# Patient Record
Sex: FEMALE | Race: WHITE | Employment: OTHER | URBAN - METROPOLITAN AREA
[De-identification: names, ages, dates, MRNs, and addresses within clinical notes are randomized per-mention and may not be internally consistent; named-entity substitution may affect disease eponyms.]

---

## 2020-06-24 ENCOUNTER — HOSPITAL ENCOUNTER (OUTPATIENT)
Dept: ULTRASOUND IMAGING | Age: 70
Discharge: HOME | End: 2020-06-24
Attending: FAMILY MEDICINE
Payer: MEDICARE

## 2020-06-24 DIAGNOSIS — R74.01 ELEVATED AST (SGOT): ICD-10-CM

## 2020-06-24 DIAGNOSIS — R74.01 ELEVATED ALT MEASUREMENT: ICD-10-CM

## 2020-06-24 PROCEDURE — 76705 ECHO EXAM OF ABDOMEN: CPT

## 2021-01-01 ENCOUNTER — HOME CARE VISIT (OUTPATIENT)
Dept: SCHEDULING | Facility: HOME HEALTH | Age: 71
End: 2021-01-01
Payer: MEDICARE

## 2021-01-01 ENCOUNTER — TRANSCRIBE ORDER (OUTPATIENT)
Dept: SCHEDULING | Age: 71
End: 2021-01-01

## 2021-01-01 ENCOUNTER — HOME CARE VISIT (OUTPATIENT)
Dept: HOME HEALTH SERVICES | Facility: HOME HEALTH | Age: 71
End: 2021-01-01
Payer: MEDICARE

## 2021-01-01 ENCOUNTER — APPOINTMENT (OUTPATIENT)
Dept: CT IMAGING | Age: 71
DRG: 641 | End: 2021-01-01
Attending: EMERGENCY MEDICINE
Payer: MEDICARE

## 2021-01-01 ENCOUNTER — OFFICE VISIT (OUTPATIENT)
Dept: HEMATOLOGY | Age: 71
End: 2021-01-01
Payer: MEDICARE

## 2021-01-01 ENCOUNTER — APPOINTMENT (OUTPATIENT)
Dept: NON INVASIVE DIAGNOSTICS | Age: 71
DRG: 641 | End: 2021-01-01
Attending: NURSE PRACTITIONER
Payer: MEDICARE

## 2021-01-01 ENCOUNTER — PATIENT OUTREACH (OUTPATIENT)
Dept: CASE MANAGEMENT | Age: 71
End: 2021-01-01

## 2021-01-01 ENCOUNTER — APPOINTMENT (OUTPATIENT)
Dept: CT IMAGING | Age: 71
End: 2021-01-01
Attending: STUDENT IN AN ORGANIZED HEALTH CARE EDUCATION/TRAINING PROGRAM
Payer: MEDICARE

## 2021-01-01 ENCOUNTER — HOSPITAL ENCOUNTER (EMERGENCY)
Age: 71
Discharge: HOME | End: 2021-11-23
Attending: STUDENT IN AN ORGANIZED HEALTH CARE EDUCATION/TRAINING PROGRAM
Payer: MEDICARE

## 2021-01-01 ENCOUNTER — HOSPITAL ENCOUNTER (INPATIENT)
Age: 71
LOS: 5 days | Discharge: NURSING, REHAB OR LONG-TERM CARE | DRG: 641 | End: 2021-12-06
Attending: EMERGENCY MEDICINE | Admitting: HOSPITALIST
Payer: MEDICARE

## 2021-01-01 ENCOUNTER — APPOINTMENT (OUTPATIENT)
Dept: GENERAL RADIOLOGY | Age: 71
DRG: 641 | End: 2021-01-01
Attending: EMERGENCY MEDICINE
Payer: MEDICARE

## 2021-01-01 ENCOUNTER — HOSPITAL ENCOUNTER (OUTPATIENT)
Dept: ULTRASOUND IMAGING | Age: 71
Discharge: HOME | End: 2021-08-05
Attending: PHYSICIAN ASSISTANT
Payer: MEDICARE

## 2021-01-01 ENCOUNTER — HOME HEALTH ADMISSION (OUTPATIENT)
Dept: HOME HEALTH SERVICES | Facility: HOME HEALTH | Age: 71
End: 2021-01-01
Payer: MEDICARE

## 2021-01-01 ENCOUNTER — HOSPITAL ENCOUNTER (OUTPATIENT)
Dept: CT IMAGING | Age: 71
Discharge: HOME | End: 2021-10-07
Attending: NURSE PRACTITIONER
Payer: MEDICARE

## 2021-01-01 ENCOUNTER — HOSPITAL ENCOUNTER (OUTPATIENT)
Dept: CT IMAGING | Age: 71
Discharge: HOME | End: 2021-07-26
Attending: PHYSICIAN ASSISTANT
Payer: MEDICARE

## 2021-01-01 VITALS
SYSTOLIC BLOOD PRESSURE: 130 MMHG | HEART RATE: 69 BPM | OXYGEN SATURATION: 99 % | DIASTOLIC BLOOD PRESSURE: 70 MMHG | RESPIRATION RATE: 16 BRPM | TEMPERATURE: 97.5 F

## 2021-01-01 VITALS
RESPIRATION RATE: 16 BRPM | OXYGEN SATURATION: 99 % | HEART RATE: 50 BPM | SYSTOLIC BLOOD PRESSURE: 138 MMHG | DIASTOLIC BLOOD PRESSURE: 80 MMHG

## 2021-01-01 VITALS
TEMPERATURE: 97.3 F | RESPIRATION RATE: 16 BRPM | SYSTOLIC BLOOD PRESSURE: 126 MMHG | OXYGEN SATURATION: 98 % | DIASTOLIC BLOOD PRESSURE: 80 MMHG | HEART RATE: 64 BPM

## 2021-01-01 VITALS
DIASTOLIC BLOOD PRESSURE: 64 MMHG | TEMPERATURE: 97.6 F | HEART RATE: 60 BPM | TEMPERATURE: 97.3 F | RESPIRATION RATE: 16 BRPM | DIASTOLIC BLOOD PRESSURE: 76 MMHG | OXYGEN SATURATION: 98 % | OXYGEN SATURATION: 98 % | SYSTOLIC BLOOD PRESSURE: 120 MMHG | RESPIRATION RATE: 18 BRPM | SYSTOLIC BLOOD PRESSURE: 118 MMHG | HEART RATE: 79 BPM

## 2021-01-01 VITALS
RESPIRATION RATE: 16 BRPM | HEART RATE: 64 BPM | SYSTOLIC BLOOD PRESSURE: 110 MMHG | OXYGEN SATURATION: 98 % | DIASTOLIC BLOOD PRESSURE: 70 MMHG | TEMPERATURE: 97.3 F

## 2021-01-01 VITALS
DIASTOLIC BLOOD PRESSURE: 64 MMHG | RESPIRATION RATE: 16 BRPM | OXYGEN SATURATION: 96 % | HEART RATE: 66 BPM | TEMPERATURE: 97 F | SYSTOLIC BLOOD PRESSURE: 104 MMHG

## 2021-01-01 VITALS
OXYGEN SATURATION: 98 % | HEART RATE: 68 BPM | TEMPERATURE: 97.1 F | DIASTOLIC BLOOD PRESSURE: 82 MMHG | SYSTOLIC BLOOD PRESSURE: 142 MMHG | RESPIRATION RATE: 18 BRPM

## 2021-01-01 VITALS
SYSTOLIC BLOOD PRESSURE: 116 MMHG | RESPIRATION RATE: 16 BRPM | BODY MASS INDEX: 32.6 KG/M2 | WEIGHT: 184 LBS | HEIGHT: 63 IN | TEMPERATURE: 98.3 F | DIASTOLIC BLOOD PRESSURE: 69 MMHG | HEART RATE: 65 BPM | OXYGEN SATURATION: 98 %

## 2021-01-01 VITALS
SYSTOLIC BLOOD PRESSURE: 123 MMHG | HEART RATE: 60 BPM | TEMPERATURE: 97.6 F | DIASTOLIC BLOOD PRESSURE: 69 MMHG | RESPIRATION RATE: 16 BRPM | OXYGEN SATURATION: 97 %

## 2021-01-01 VITALS
SYSTOLIC BLOOD PRESSURE: 138 MMHG | RESPIRATION RATE: 16 BRPM | DIASTOLIC BLOOD PRESSURE: 72 MMHG | HEART RATE: 60 BPM | TEMPERATURE: 97.1 F

## 2021-01-01 VITALS
OXYGEN SATURATION: 98 % | HEART RATE: 60 BPM | SYSTOLIC BLOOD PRESSURE: 108 MMHG | RESPIRATION RATE: 18 BRPM | DIASTOLIC BLOOD PRESSURE: 64 MMHG

## 2021-01-01 VITALS
TEMPERATURE: 97.4 F | OXYGEN SATURATION: 97 % | HEART RATE: 78 BPM | DIASTOLIC BLOOD PRESSURE: 72 MMHG | RESPIRATION RATE: 18 BRPM | SYSTOLIC BLOOD PRESSURE: 122 MMHG

## 2021-01-01 VITALS
SYSTOLIC BLOOD PRESSURE: 130 MMHG | RESPIRATION RATE: 18 BRPM | DIASTOLIC BLOOD PRESSURE: 68 MMHG | OXYGEN SATURATION: 97 % | TEMPERATURE: 97.4 F | HEART RATE: 88 BPM

## 2021-01-01 VITALS
SYSTOLIC BLOOD PRESSURE: 131 MMHG | DIASTOLIC BLOOD PRESSURE: 70 MMHG | HEART RATE: 80 BPM | TEMPERATURE: 97.8 F | OXYGEN SATURATION: 98 % | RESPIRATION RATE: 18 BRPM

## 2021-01-01 VITALS
SYSTOLIC BLOOD PRESSURE: 130 MMHG | OXYGEN SATURATION: 98 % | TEMPERATURE: 97.1 F | DIASTOLIC BLOOD PRESSURE: 84 MMHG | HEART RATE: 81 BPM | RESPIRATION RATE: 16 BRPM

## 2021-01-01 VITALS
TEMPERATURE: 97.8 F | HEART RATE: 60 BPM | RESPIRATION RATE: 18 BRPM | SYSTOLIC BLOOD PRESSURE: 124 MMHG | DIASTOLIC BLOOD PRESSURE: 98 MMHG | OXYGEN SATURATION: 98 %

## 2021-01-01 VITALS
TEMPERATURE: 97.8 F | DIASTOLIC BLOOD PRESSURE: 98 MMHG | HEART RATE: 60 BPM | SYSTOLIC BLOOD PRESSURE: 124 MMHG | RESPIRATION RATE: 18 BRPM | OXYGEN SATURATION: 98 %

## 2021-01-01 VITALS
DIASTOLIC BLOOD PRESSURE: 64 MMHG | TEMPERATURE: 96.8 F | HEART RATE: 62 BPM | OXYGEN SATURATION: 97 % | SYSTOLIC BLOOD PRESSURE: 106 MMHG | RESPIRATION RATE: 18 BRPM

## 2021-01-01 VITALS
TEMPERATURE: 97.2 F | OXYGEN SATURATION: 97 % | HEART RATE: 88 BPM | DIASTOLIC BLOOD PRESSURE: 68 MMHG | RESPIRATION RATE: 18 BRPM | SYSTOLIC BLOOD PRESSURE: 118 MMHG

## 2021-01-01 VITALS
RESPIRATION RATE: 18 BRPM | SYSTOLIC BLOOD PRESSURE: 124 MMHG | OXYGEN SATURATION: 98 % | DIASTOLIC BLOOD PRESSURE: 72 MMHG | HEART RATE: 87 BPM | TEMPERATURE: 97.4 F

## 2021-01-01 VITALS
SYSTOLIC BLOOD PRESSURE: 129 MMHG | TEMPERATURE: 97 F | DIASTOLIC BLOOD PRESSURE: 76 MMHG | OXYGEN SATURATION: 96 % | HEART RATE: 77 BPM

## 2021-01-01 VITALS
RESPIRATION RATE: 16 BRPM | OXYGEN SATURATION: 99 % | TEMPERATURE: 97 F | DIASTOLIC BLOOD PRESSURE: 76 MMHG | SYSTOLIC BLOOD PRESSURE: 120 MMHG | HEART RATE: 70 BPM

## 2021-01-01 VITALS
RESPIRATION RATE: 18 BRPM | SYSTOLIC BLOOD PRESSURE: 118 MMHG | HEART RATE: 60 BPM | TEMPERATURE: 97.5 F | OXYGEN SATURATION: 94 % | DIASTOLIC BLOOD PRESSURE: 70 MMHG

## 2021-01-01 VITALS
DIASTOLIC BLOOD PRESSURE: 80 MMHG | TEMPERATURE: 97.2 F | RESPIRATION RATE: 16 BRPM | HEART RATE: 80 BPM | SYSTOLIC BLOOD PRESSURE: 130 MMHG | OXYGEN SATURATION: 98 %

## 2021-01-01 VITALS
TEMPERATURE: 97.8 F | OXYGEN SATURATION: 92 % | HEIGHT: 64 IN | DIASTOLIC BLOOD PRESSURE: 71 MMHG | BODY MASS INDEX: 26.34 KG/M2 | RESPIRATION RATE: 19 BRPM | HEART RATE: 87 BPM | WEIGHT: 154.3 LBS | SYSTOLIC BLOOD PRESSURE: 128 MMHG

## 2021-01-01 VITALS
OXYGEN SATURATION: 98 % | SYSTOLIC BLOOD PRESSURE: 100 MMHG | DIASTOLIC BLOOD PRESSURE: 60 MMHG | RESPIRATION RATE: 16 BRPM | HEART RATE: 62 BPM

## 2021-01-01 VITALS
OXYGEN SATURATION: 98 % | TEMPERATURE: 97.6 F | SYSTOLIC BLOOD PRESSURE: 110 MMHG | HEART RATE: 60 BPM | DIASTOLIC BLOOD PRESSURE: 64 MMHG | RESPIRATION RATE: 16 BRPM

## 2021-01-01 VITALS
SYSTOLIC BLOOD PRESSURE: 110 MMHG | DIASTOLIC BLOOD PRESSURE: 58 MMHG | HEART RATE: 60 BPM | RESPIRATION RATE: 18 BRPM | TEMPERATURE: 97 F | OXYGEN SATURATION: 98 %

## 2021-01-01 VITALS
HEART RATE: 60 BPM | TEMPERATURE: 97.3 F | OXYGEN SATURATION: 98 % | DIASTOLIC BLOOD PRESSURE: 68 MMHG | RESPIRATION RATE: 18 BRPM | SYSTOLIC BLOOD PRESSURE: 122 MMHG

## 2021-01-01 VITALS
HEART RATE: 55 BPM | TEMPERATURE: 97.2 F | RESPIRATION RATE: 18 BRPM | DIASTOLIC BLOOD PRESSURE: 68 MMHG | OXYGEN SATURATION: 96 % | SYSTOLIC BLOOD PRESSURE: 110 MMHG

## 2021-01-01 VITALS
RESPIRATION RATE: 18 BRPM | OXYGEN SATURATION: 95 % | SYSTOLIC BLOOD PRESSURE: 122 MMHG | DIASTOLIC BLOOD PRESSURE: 78 MMHG | HEART RATE: 70 BPM | TEMPERATURE: 97.2 F

## 2021-01-01 VITALS
TEMPERATURE: 97.1 F | TEMPERATURE: 99 F | SYSTOLIC BLOOD PRESSURE: 120 MMHG | DIASTOLIC BLOOD PRESSURE: 80 MMHG | BODY MASS INDEX: 30.12 KG/M2 | RESPIRATION RATE: 16 BRPM | HEIGHT: 63 IN | RESPIRATION RATE: 16 BRPM | OXYGEN SATURATION: 97 % | WEIGHT: 170 LBS | DIASTOLIC BLOOD PRESSURE: 66 MMHG | OXYGEN SATURATION: 97 % | HEART RATE: 60 BPM | SYSTOLIC BLOOD PRESSURE: 128 MMHG | HEART RATE: 84 BPM

## 2021-01-01 VITALS
OXYGEN SATURATION: 95 % | SYSTOLIC BLOOD PRESSURE: 120 MMHG | DIASTOLIC BLOOD PRESSURE: 64 MMHG | RESPIRATION RATE: 16 BRPM | TEMPERATURE: 97.5 F | HEART RATE: 60 BPM

## 2021-01-01 VITALS
HEART RATE: 87 BPM | RESPIRATION RATE: 18 BRPM | TEMPERATURE: 97.4 F | DIASTOLIC BLOOD PRESSURE: 72 MMHG | SYSTOLIC BLOOD PRESSURE: 124 MMHG | OXYGEN SATURATION: 98 %

## 2021-01-01 VITALS
HEART RATE: 54 BPM | TEMPERATURE: 97.2 F | DIASTOLIC BLOOD PRESSURE: 76 MMHG | SYSTOLIC BLOOD PRESSURE: 120 MMHG | RESPIRATION RATE: 18 BRPM | OXYGEN SATURATION: 94 %

## 2021-01-01 VITALS
SYSTOLIC BLOOD PRESSURE: 111 MMHG | TEMPERATURE: 97.7 F | RESPIRATION RATE: 20 BRPM | WEIGHT: 160 LBS | HEART RATE: 62 BPM | BODY MASS INDEX: 28.35 KG/M2 | OXYGEN SATURATION: 96 % | DIASTOLIC BLOOD PRESSURE: 65 MMHG | HEIGHT: 63 IN

## 2021-01-01 VITALS
SYSTOLIC BLOOD PRESSURE: 112 MMHG | HEART RATE: 81 BPM | TEMPERATURE: 97.1 F | RESPIRATION RATE: 18 BRPM | OXYGEN SATURATION: 96 % | DIASTOLIC BLOOD PRESSURE: 65 MMHG

## 2021-01-01 VITALS
SYSTOLIC BLOOD PRESSURE: 122 MMHG | RESPIRATION RATE: 18 BRPM | HEART RATE: 59 BPM | TEMPERATURE: 97.1 F | DIASTOLIC BLOOD PRESSURE: 68 MMHG | OXYGEN SATURATION: 98 %

## 2021-01-01 VITALS
TEMPERATURE: 97 F | RESPIRATION RATE: 18 BRPM | OXYGEN SATURATION: 98 % | DIASTOLIC BLOOD PRESSURE: 80 MMHG | HEART RATE: 70 BPM | SYSTOLIC BLOOD PRESSURE: 140 MMHG

## 2021-01-01 DIAGNOSIS — R74.8 ELEVATED LIVER ENZYMES: ICD-10-CM

## 2021-01-01 DIAGNOSIS — R26.89 LOSS OF BALANCE: Primary | ICD-10-CM

## 2021-01-01 DIAGNOSIS — R74.8 ELEVATED LIVER ENZYMES: Primary | ICD-10-CM

## 2021-01-01 DIAGNOSIS — D69.6 THROMBOCYTOPENIA (HCC): ICD-10-CM

## 2021-01-01 DIAGNOSIS — R79.89 ELEVATED LFTS: ICD-10-CM

## 2021-01-01 DIAGNOSIS — S09.90XA CLOSED HEAD INJURY, INITIAL ENCOUNTER: ICD-10-CM

## 2021-01-01 DIAGNOSIS — K74.60 CIRRHOSIS OF LIVER WITHOUT ASCITES, UNSPECIFIED HEPATIC CIRRHOSIS TYPE (HCC): ICD-10-CM
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
## 2021-01-01 DIAGNOSIS — R29.6 RECURRENT FALLS: Primary | ICD-10-CM

## 2021-01-01 DIAGNOSIS — R62.7 FAILURE TO THRIVE IN ADULT: ICD-10-CM

## 2021-01-01 DIAGNOSIS — D69.6 THROMBOCYTOPENIC (HCC): ICD-10-CM

## 2021-01-01 DIAGNOSIS — R26.89 LOSS OF BALANCE: ICD-10-CM

## 2021-01-01 DIAGNOSIS — C50.919 MALIGNANT NEOPLASM OF FEMALE BREAST, UNSPECIFIED ESTROGEN RECEPTOR STATUS, UNSPECIFIED LATERALITY, UNSPECIFIED SITE OF BREAST (HCC): ICD-10-CM

## 2021-01-01 DIAGNOSIS — E53.8 B12 DEFICIENCY: ICD-10-CM

## 2021-01-01 DIAGNOSIS — D69.6 THROMBOCYTOPENIA (HCC): Primary | ICD-10-CM

## 2021-01-01 DIAGNOSIS — R26.2 AMBULATORY DYSFUNCTION: ICD-10-CM

## 2021-01-01 DIAGNOSIS — E87.6 HYPOKALEMIA: ICD-10-CM

## 2021-01-01 LAB
ACE SERPL-CCNC: 8 U/L (ref 14–82)
ACTIN IGG SERPL-ACNC: 6 UNITS (ref 0–19)
AFP L3 MFR SERPL: 9.9 % (ref 0–9.9)
AFP SERPL-MCNC: 5 NG/ML (ref 0–8)
ALBUMIN SERPL-MCNC: 2.1 G/DL (ref 3.5–5)
ALBUMIN SERPL-MCNC: 2.1 G/DL (ref 3.5–5)
ALBUMIN SERPL-MCNC: 2.2 G/DL (ref 3.5–5)
ALBUMIN SERPL-MCNC: 2.5 G/DL (ref 3.5–5)
ALBUMIN SERPL-MCNC: 2.6 G/DL (ref 3.5–5)
ALBUMIN SERPL-MCNC: 3 G/DL (ref 3.5–5)
ALBUMIN SERPL-MCNC: 3.2 G/DL (ref 3.5–5)
ALBUMIN/GLOB SERPL: 0.5 {RATIO} (ref 1.1–2.2)
ALBUMIN/GLOB SERPL: 0.5 {RATIO} (ref 1.1–2.2)
ALBUMIN/GLOB SERPL: 0.6 {RATIO} (ref 1.1–2.2)
ALBUMIN/GLOB SERPL: 0.7 {RATIO} (ref 1.1–2.2)
ALP SERPL-CCNC: 100 U/L (ref 45–117)
ALP SERPL-CCNC: 101 U/L (ref 45–117)
ALP SERPL-CCNC: 107 U/L (ref 45–117)
ALP SERPL-CCNC: 112 U/L (ref 45–117)
ALP SERPL-CCNC: 116 U/L (ref 45–117)
ALP SERPL-CCNC: 94 U/L (ref 45–117)
ALP SERPL-CCNC: 95 U/L (ref 45–117)
ALT SERPL-CCNC: 115 U/L (ref 12–78)
ALT SERPL-CCNC: 55 U/L (ref 12–78)
ALT SERPL-CCNC: 89 U/L (ref 12–78)
ALT SERPL-CCNC: 90 U/L (ref 12–78)
ALT SERPL-CCNC: 91 U/L (ref 12–78)
ALT SERPL-CCNC: 95 U/L (ref 12–78)
ALT SERPL-CCNC: 99 U/L (ref 12–78)
AMMONIA PLAS-SCNC: 34 UMOL/L
AMMONIA PLAS-SCNC: 43 UMOL/L
AMMONIA PLAS-SCNC: 62 UMOL/L
AMMONIA PLAS-SCNC: 69 UMOL/L
ANA SER QL: NEGATIVE
ANION GAP SERPL CALC-SCNC: 10 MMOL/L (ref 5–15)
ANION GAP SERPL CALC-SCNC: 4 MMOL/L (ref 5–15)
ANION GAP SERPL CALC-SCNC: 6 MMOL/L (ref 5–15)
ANION GAP SERPL CALC-SCNC: 7 MMOL/L (ref 5–15)
ANION GAP SERPL CALC-SCNC: 8 MMOL/L (ref 5–15)
ANION GAP SERPL CALC-SCNC: 8 MMOL/L (ref 5–15)
ANION GAP SERPL CALC-SCNC: 9 MMOL/L (ref 5–15)
APPEARANCE UR: ABNORMAL
APPEARANCE UR: CLEAR
AST SERPL-CCNC: 143 U/L (ref 15–37)
AST SERPL-CCNC: 151 U/L (ref 15–37)
AST SERPL-CCNC: 155 U/L (ref 15–37)
AST SERPL-CCNC: 161 U/L (ref 15–37)
AST SERPL-CCNC: 186 U/L (ref 15–37)
AST SERPL-CCNC: 214 U/L (ref 15–37)
AST SERPL-CCNC: 98 U/L (ref 15–37)
ATRIAL RATE: 75 BPM
ATRIAL RATE: 79 BPM
BACTERIA URNS QL MICRO: NEGATIVE /HPF
BACTERIA URNS QL MICRO: NEGATIVE /HPF
BASOPHILS # BLD: 0 K/UL (ref 0–0.1)
BASOPHILS NFR BLD: 0 % (ref 0–1)
BASOPHILS NFR BLD: 1 % (ref 0–1)
BILIRUB DIRECT SERPL-MCNC: 0.3 MG/DL (ref 0–0.2)
BILIRUB DIRECT SERPL-MCNC: 0.4 MG/DL (ref 0–0.2)
BILIRUB INDIRECT SERPL-MCNC: 0.9 MG/DL (ref 0–1.1)
BILIRUB SERPL-MCNC: 0.8 MG/DL (ref 0.2–1)
BILIRUB SERPL-MCNC: 1.3 MG/DL (ref 0.2–1)
BILIRUB SERPL-MCNC: 1.6 MG/DL (ref 0.2–1)
BILIRUB SERPL-MCNC: 1.8 MG/DL (ref 0.2–1)
BILIRUB SERPL-MCNC: 2 MG/DL (ref 0.2–1)
BILIRUB UR QL CFM: NEGATIVE
BILIRUB UR QL CFM: NEGATIVE
BNP SERPL-MCNC: 1272 PG/ML
BUN SERPL-MCNC: 12 MG/DL (ref 6–20)
BUN SERPL-MCNC: 13 MG/DL (ref 6–20)
BUN SERPL-MCNC: 14 MG/DL (ref 6–20)
BUN SERPL-MCNC: 15 MG/DL (ref 6–20)
BUN SERPL-MCNC: 16 MG/DL (ref 6–20)
BUN SERPL-MCNC: 17 MG/DL (ref 6–20)
BUN SERPL-MCNC: 28 MG/DL (ref 6–20)
BUN/CREAT SERPL: 17 (ref 12–20)
BUN/CREAT SERPL: 18 (ref 12–20)
BUN/CREAT SERPL: 19 (ref 12–20)
BUN/CREAT SERPL: 20 (ref 12–20)
BUN/CREAT SERPL: 22 (ref 12–20)
C-ANCA TITR SER IF: ABNORMAL TITER
CALCIUM SERPL-MCNC: 8.2 MG/DL (ref 8.5–10.1)
CALCIUM SERPL-MCNC: 8.4 MG/DL (ref 8.5–10.1)
CALCIUM SERPL-MCNC: 8.4 MG/DL (ref 8.5–10.1)
CALCIUM SERPL-MCNC: 8.5 MG/DL (ref 8.5–10.1)
CALCIUM SERPL-MCNC: 8.9 MG/DL (ref 8.5–10.1)
CALCIUM SERPL-MCNC: 9 MG/DL (ref 8.5–10.1)
CALCIUM SERPL-MCNC: 9.4 MG/DL (ref 8.5–10.1)
CALCULATED P AXIS, ECG09: 28 DEGREES
CALCULATED P AXIS, ECG09: 57 DEGREES
CALCULATED R AXIS, ECG10: 12 DEGREES
CALCULATED R AXIS, ECG10: 13 DEGREES
CALCULATED T AXIS, ECG11: 29 DEGREES
CALCULATED T AXIS, ECG11: 47 DEGREES
CERULOPLASMIN SERPL-MCNC: 28 MG/DL (ref 19–39)
CHLORIDE SERPL-SCNC: 102 MMOL/L (ref 97–108)
CHLORIDE SERPL-SCNC: 102 MMOL/L (ref 97–108)
CHLORIDE SERPL-SCNC: 103 MMOL/L (ref 97–108)
CHLORIDE SERPL-SCNC: 104 MMOL/L (ref 97–108)
CHLORIDE SERPL-SCNC: 105 MMOL/L (ref 97–108)
CHLORIDE SERPL-SCNC: 109 MMOL/L (ref 97–108)
CHLORIDE SERPL-SCNC: 109 MMOL/L (ref 97–108)
CHOLEST SERPL-MCNC: 113 MG/DL
CK MB CFR SERPL CALC: 1.8 % (ref 0–2.5)
CK MB CFR SERPL CALC: 2 % (ref 0–2.5)
CK MB SERPL-MCNC: 2 NG/ML (ref 5–25)
CK MB SERPL-MCNC: 2.5 NG/ML (ref 5–25)
CK SERPL-CCNC: 102 U/L (ref 26–192)
CK SERPL-CCNC: 141 U/L (ref 26–192)
CO2 SERPL-SCNC: 23 MMOL/L (ref 21–32)
CO2 SERPL-SCNC: 24 MMOL/L (ref 21–32)
CO2 SERPL-SCNC: 26 MMOL/L (ref 21–32)
CO2 SERPL-SCNC: 27 MMOL/L (ref 21–32)
CO2 SERPL-SCNC: 29 MMOL/L (ref 21–32)
COLOR UR: ABNORMAL
COLOR UR: ABNORMAL
COMMENT, HOLDF: NORMAL
CORTIS AM PEAK SERPL-MCNC: 8.1 UG/DL (ref 4.3–22.45)
COVID-19 RAPID TEST, COVR: NOT DETECTED
CREAT BLD-MCNC: 0.8 MG/DL (ref 0.6–1.3)
CREAT SERPL-MCNC: 0.69 MG/DL (ref 0.55–1.02)
CREAT SERPL-MCNC: 0.7 MG/DL (ref 0.55–1.02)
CREAT SERPL-MCNC: 0.72 MG/DL (ref 0.55–1.02)
CREAT SERPL-MCNC: 0.72 MG/DL (ref 0.55–1.02)
CREAT SERPL-MCNC: 0.76 MG/DL (ref 0.55–1.02)
CREAT SERPL-MCNC: 0.81 MG/DL (ref 0.55–1.02)
CREAT SERPL-MCNC: 1.29 MG/DL (ref 0.55–1.02)
DIAGNOSIS, 93000: NORMAL
DIAGNOSIS, 93000: NORMAL
DIFFERENTIAL METHOD BLD: ABNORMAL
ECHO AO ROOT DIAM: 2.38 CM
ECHO AV AREA PEAK VELOCITY: 1.88 CM2
ECHO AV AREA/BSA PEAK VELOCITY: 1 CM2/M2
ECHO AV MEAN GRADIENT: 5.5 MMHG
ECHO AV PEAK GRADIENT: 9.94 MMHG
ECHO AV PEAK VELOCITY: 157.59 CM/S
ECHO AV VTI: 31.94 CM
ECHO EST RA PRESSURE: 10 MMHG
ECHO LA AREA 4C: 19.06 CM2
ECHO LA MAJOR AXIS: 3.78 CM
ECHO LA MINOR AXIS: 2.07 CM
ECHO LA VOL 4C: 55.42 ML (ref 22–52)
ECHO LA VOLUME INDEX A4C: 30.28 ML/M2 (ref 16–28)
ECHO LV E' LATERAL VELOCITY: 8.38 CM/S
ECHO LV E' SEPTAL VELOCITY: 7.05 CM/S
ECHO LV EDV A4C: 67.65 ML
ECHO LV EDV INDEX A4C: 37 ML/M2
ECHO LV EJECTION FRACTION A4C: 65 PERCENT
ECHO LV ESV A4C: 23.52 ML
ECHO LV ESV INDEX A4C: 12.9 ML/M2
ECHO LV INTERNAL DIMENSION DIASTOLIC: 4.58 CM (ref 3.9–5.3)
ECHO LV INTERNAL DIMENSION SYSTOLIC: 3.08 CM
ECHO LV IVSD: 0.78 CM (ref 0.6–0.9)
ECHO LV MASS 2D: 127.7 G (ref 67–162)
ECHO LV MASS INDEX 2D: 69.8 G/M2 (ref 43–95)
ECHO LV POSTERIOR WALL DIASTOLIC: 0.93 CM (ref 0.6–0.9)
ECHO LVOT DIAM: 1.67 CM
ECHO LVOT PEAK GRADIENT: 7.33 MMHG
ECHO LVOT PEAK VELOCITY: 135.38 CM/S
ECHO MV A VELOCITY: 88.68 CM/S
ECHO MV E DECELERATION TIME (DT): 163.8 MS
ECHO MV E VELOCITY: 110.6 CM/S
ECHO MV E/A RATIO: 1.25
ECHO MV E/E' LATERAL: 13.2
ECHO MV E/E' RATIO (AVERAGED): 14.44
ECHO MV E/E' SEPTAL: 15.69
ECHO MV EROA PISA: 0.03 CM2
ECHO MV MAX VELOCITY: 117.46 CM/S
ECHO MV MEAN GRADIENT: 2.86 MMHG
ECHO MV PEAK GRADIENT: 5.52 MMHG
ECHO MV REGURGITANT RADIUS PISA: 0.26 CM
ECHO MV REGURGITANT VOLUME: 5.08 ML
ECHO MV REGURGITANT VTIA: 185.91 CM
ECHO MV VTI: 33.57 CM
ECHO PV MAX VELOCITY: 119.96 CM/S
ECHO PV PEAK INSTANTANEOUS GRADIENT SYSTOLIC: 5.76 MMHG
ECHO RA AREA 4C: 11.36 CM2
ECHO RIGHT VENTRICULAR SYSTOLIC PRESSURE (RVSP): 47.09 MMHG
ECHO TV REGURGITANT MAX VELOCITY: 304.19 CM/S
ECHO TV REGURGITANT MAX VELOCITY: 533.49 CM/S
ECHO TV REGURGITANT PEAK GRADIENT: 37.09 MMHG
EOSINOPHIL # BLD: 0.1 K/UL (ref 0–0.4)
EOSINOPHIL # BLD: 0.2 K/UL (ref 0–0.4)
EOSINOPHIL NFR BLD: 1 % (ref 0–7)
EOSINOPHIL NFR BLD: 4 % (ref 0–7)
EOSINOPHIL NFR BLD: 5 % (ref 0–7)
EOSINOPHIL NFR BLD: 5 % (ref 0–7)
EPITH CASTS URNS QL MICRO: ABNORMAL /LPF
EPITH CASTS URNS QL MICRO: ABNORMAL /LPF
ERYTHROCYTE [DISTWIDTH] IN BLOOD BY AUTOMATED COUNT: 16 % (ref 11.5–14.5)
ERYTHROCYTE [DISTWIDTH] IN BLOOD BY AUTOMATED COUNT: 17.6 % (ref 11.5–14.5)
ERYTHROCYTE [DISTWIDTH] IN BLOOD BY AUTOMATED COUNT: 18.5 % (ref 11.5–14.5)
ERYTHROCYTE [DISTWIDTH] IN BLOOD BY AUTOMATED COUNT: 18.6 % (ref 11.5–14.5)
ERYTHROCYTE [DISTWIDTH] IN BLOOD BY AUTOMATED COUNT: 18.9 % (ref 11.5–14.5)
ERYTHROCYTE [DISTWIDTH] IN BLOOD BY AUTOMATED COUNT: 19.2 % (ref 11.5–14.5)
ERYTHROCYTE [DISTWIDTH] IN BLOOD BY AUTOMATED COUNT: 19.5 % (ref 11.5–14.5)
FERRITIN SERPL-MCNC: 23 NG/ML (ref 26–388)
GLOBULIN SER CALC-MCNC: 3.9 G/DL (ref 2–4)
GLOBULIN SER CALC-MCNC: 4 G/DL (ref 2–4)
GLOBULIN SER CALC-MCNC: 4.1 G/DL (ref 2–4)
GLOBULIN SER CALC-MCNC: 4.3 G/DL (ref 2–4)
GLOBULIN SER CALC-MCNC: 4.4 G/DL (ref 2–4)
GLOBULIN SER CALC-MCNC: 4.6 G/DL (ref 2–4)
GLOBULIN SER CALC-MCNC: 4.8 G/DL (ref 2–4)
GLUCOSE SERPL-MCNC: 68 MG/DL (ref 65–100)
GLUCOSE SERPL-MCNC: 79 MG/DL (ref 65–100)
GLUCOSE SERPL-MCNC: 82 MG/DL (ref 65–100)
GLUCOSE SERPL-MCNC: 83 MG/DL (ref 65–100)
GLUCOSE SERPL-MCNC: 84 MG/DL (ref 65–100)
GLUCOSE SERPL-MCNC: 88 MG/DL (ref 65–100)
GLUCOSE SERPL-MCNC: 89 MG/DL (ref 65–100)
GLUCOSE UR STRIP.AUTO-MCNC: NEGATIVE MG/DL
GLUCOSE UR STRIP.AUTO-MCNC: NEGATIVE MG/DL
GRAN CASTS URNS QL MICRO: ABNORMAL /LPF
HAPTOGLOB SERPL-MCNC: 46 MG/DL (ref 30–200)
HAV AB SER QL IA: NEGATIVE
HCT VFR BLD AUTO: 28.6 % (ref 35–47)
HCT VFR BLD AUTO: 30.3 % (ref 35–47)
HCT VFR BLD AUTO: 30.5 % (ref 35–47)
HCT VFR BLD AUTO: 31.2 % (ref 35–47)
HCT VFR BLD AUTO: 31.4 % (ref 35–47)
HCT VFR BLD AUTO: 31.9 % (ref 35–47)
HCT VFR BLD AUTO: 36.7 % (ref 35–47)
HCV RNA SERPL QL NAA+PROBE: NEGATIVE
HDLC SERPL-MCNC: 26 MG/DL
HDLC SERPL: 4.3 {RATIO} (ref 0–5)
HGB BLD-MCNC: 10.1 G/DL (ref 11.5–16)
HGB BLD-MCNC: 10.1 G/DL (ref 11.5–16)
HGB BLD-MCNC: 10.2 G/DL (ref 11.5–16)
HGB BLD-MCNC: 10.5 G/DL (ref 11.5–16)
HGB BLD-MCNC: 11.5 G/DL (ref 11.5–16)
HGB BLD-MCNC: 9.3 G/DL (ref 11.5–16)
HGB BLD-MCNC: 9.8 G/DL (ref 11.5–16)
HGB UR QL STRIP: ABNORMAL
HGB UR QL STRIP: NEGATIVE
HYALINE CASTS URNS QL MICRO: ABNORMAL /LPF (ref 0–5)
IMM GRANULOCYTES # BLD AUTO: 0 K/UL (ref 0–0.04)
IMM GRANULOCYTES NFR BLD AUTO: 0 % (ref 0–0.5)
IMM GRANULOCYTES NFR BLD AUTO: 1 % (ref 0–0.5)
IMM GRANULOCYTES NFR BLD AUTO: 1 % (ref 0–0.5)
INR PPP: 1.1 (ref 0.9–1.1)
IRON SATN MFR SERPL: 11 % (ref 20–50)
IRON SERPL-MCNC: 56 UG/DL (ref 35–150)
KETONES UR QL STRIP.AUTO: 15 MG/DL
KETONES UR QL STRIP.AUTO: 15 MG/DL
LAB DIRECTOR NAME PROVIDER: NORMAL
LACTATE SERPL-SCNC: 1.1 MMOL/L (ref 0.4–2)
LDH SERPL L TO P-CCNC: 342 U/L (ref 81–246)
LDLC SERPL CALC-MCNC: 65.8 MG/DL (ref 0–100)
LEUKOCYTE ESTERASE UR QL STRIP.AUTO: NEGATIVE
LEUKOCYTE ESTERASE UR QL STRIP.AUTO: NEGATIVE
LYMPHOCYTES # BLD: 0.6 K/UL (ref 0.8–3.5)
LYMPHOCYTES # BLD: 0.7 K/UL (ref 0.8–3.5)
LYMPHOCYTES # BLD: 0.8 K/UL (ref 0.8–3.5)
LYMPHOCYTES # BLD: 0.9 K/UL (ref 0.8–3.5)
LYMPHOCYTES # BLD: 1 K/UL (ref 0.8–3.5)
LYMPHOCYTES # BLD: 1 K/UL (ref 0.8–3.5)
LYMPHOCYTES NFR BLD: 15 % (ref 12–49)
LYMPHOCYTES NFR BLD: 16 % (ref 12–49)
LYMPHOCYTES NFR BLD: 20 % (ref 12–49)
LYMPHOCYTES NFR BLD: 20 % (ref 12–49)
LYMPHOCYTES NFR BLD: 22 % (ref 12–49)
LYMPHOCYTES NFR BLD: 24 % (ref 12–49)
MAGNESIUM SERPL-MCNC: 1.9 MG/DL (ref 1.6–2.4)
MAGNESIUM SERPL-MCNC: 2.1 MG/DL (ref 1.6–2.4)
MCH RBC QN AUTO: 28.4 PG (ref 26–34)
MCH RBC QN AUTO: 28.5 PG (ref 26–34)
MCH RBC QN AUTO: 28.9 PG (ref 26–34)
MCH RBC QN AUTO: 29 PG (ref 26–34)
MCH RBC QN AUTO: 29.2 PG (ref 26–34)
MCH RBC QN AUTO: 29.2 PG (ref 26–34)
MCH RBC QN AUTO: 29.3 PG (ref 26–34)
MCHC RBC AUTO-ENTMCNC: 31.3 G/DL (ref 30–36.5)
MCHC RBC AUTO-ENTMCNC: 32.1 G/DL (ref 30–36.5)
MCHC RBC AUTO-ENTMCNC: 32.2 G/DL (ref 30–36.5)
MCHC RBC AUTO-ENTMCNC: 32.5 G/DL (ref 30–36.5)
MCHC RBC AUTO-ENTMCNC: 32.7 G/DL (ref 30–36.5)
MCHC RBC AUTO-ENTMCNC: 32.9 G/DL (ref 30–36.5)
MCHC RBC AUTO-ENTMCNC: 33.3 G/DL (ref 30–36.5)
MCV RBC AUTO: 86.9 FL (ref 80–99)
MCV RBC AUTO: 87.8 FL (ref 80–99)
MCV RBC AUTO: 87.9 FL (ref 80–99)
MCV RBC AUTO: 89.7 FL (ref 80–99)
MCV RBC AUTO: 90.2 FL (ref 80–99)
MCV RBC AUTO: 90.8 FL (ref 80–99)
MCV RBC AUTO: 91.1 FL (ref 80–99)
MITOCHONDRIA M2 IGG SER-ACNC: <20 UNITS (ref 0–20)
MONOCYTES # BLD: 0.3 K/UL (ref 0–1)
MONOCYTES # BLD: 0.3 K/UL (ref 0–1)
MONOCYTES # BLD: 0.4 K/UL (ref 0–1)
MONOCYTES NFR BLD: 10 % (ref 5–13)
MONOCYTES NFR BLD: 10 % (ref 5–13)
MONOCYTES NFR BLD: 8 % (ref 5–13)
MONOCYTES NFR BLD: 8 % (ref 5–13)
MONOCYTES NFR BLD: 9 % (ref 5–13)
MONOCYTES NFR BLD: 9 % (ref 5–13)
MR PISA PV: 556.36 CM/S
MYELOPEROXIDASE AB SER IA-ACNC: <9 U/ML (ref 0–9)
NEUTS SEG # BLD: 2.2 K/UL (ref 1.8–8)
NEUTS SEG # BLD: 2.7 K/UL (ref 1.8–8)
NEUTS SEG # BLD: 2.7 K/UL (ref 1.8–8)
NEUTS SEG # BLD: 2.8 K/UL (ref 1.8–8)
NEUTS SEG # BLD: 2.9 K/UL (ref 1.8–8)
NEUTS SEG # BLD: 4.1 K/UL (ref 1.8–8)
NEUTS SEG NFR BLD: 62 % (ref 32–75)
NEUTS SEG NFR BLD: 63 % (ref 32–75)
NEUTS SEG NFR BLD: 64 % (ref 32–75)
NEUTS SEG NFR BLD: 66 % (ref 32–75)
NEUTS SEG NFR BLD: 70 % (ref 32–75)
NEUTS SEG NFR BLD: 76 % (ref 32–75)
NITRITE UR QL STRIP.AUTO: NEGATIVE
NITRITE UR QL STRIP.AUTO: NEGATIVE
NRBC # BLD: 0 K/UL (ref 0–0.01)
NRBC BLD-RTO: 0 PER 100 WBC
P-ANCA ATYPICAL TITR SER IF: ABNORMAL TITER
P-ANCA TITR SER IF: ABNORMAL TITER
P-R INTERVAL, ECG05: 140 MS
P-R INTERVAL, ECG05: 152 MS
PH UR STRIP: 5.5 [PH] (ref 5–8)
PH UR STRIP: 5.5 [PH] (ref 5–8)
PLATELET # BLD AUTO: 126 K/UL (ref 150–400)
PLATELET # BLD AUTO: 74 K/UL (ref 150–400)
PLATELET # BLD AUTO: 79 K/UL (ref 150–400)
PLATELET # BLD AUTO: 84 K/UL (ref 150–400)
PLATELET # BLD AUTO: 85 K/UL (ref 150–400)
PLATELET # BLD AUTO: 90 K/UL (ref 150–400)
PLATELET # BLD AUTO: 91 K/UL (ref 150–400)
PLATELET COMMENTS,PCOM: ABNORMAL
PMV BLD AUTO: 10.7 FL (ref 8.9–12.9)
PMV BLD AUTO: 10.8 FL (ref 8.9–12.9)
PMV BLD AUTO: 11.1 FL (ref 8.9–12.9)
PMV BLD AUTO: 11.3 FL (ref 8.9–12.9)
PMV BLD AUTO: 11.6 FL (ref 8.9–12.9)
PMV BLD AUTO: 12.3 FL (ref 8.9–12.9)
POTASSIUM SERPL-SCNC: 2.8 MMOL/L (ref 3.5–5.1)
POTASSIUM SERPL-SCNC: 3.2 MMOL/L (ref 3.5–5.1)
POTASSIUM SERPL-SCNC: 3.3 MMOL/L (ref 3.5–5.1)
POTASSIUM SERPL-SCNC: 3.4 MMOL/L (ref 3.5–5.1)
POTASSIUM SERPL-SCNC: 3.6 MMOL/L (ref 3.5–5.1)
POTASSIUM SERPL-SCNC: 3.7 MMOL/L (ref 3.5–5.1)
POTASSIUM SERPL-SCNC: 3.8 MMOL/L (ref 3.5–5.1)
PROT SERPL-MCNC: 6 G/DL (ref 6.4–8.2)
PROT SERPL-MCNC: 6.2 G/DL (ref 6.4–8.2)
PROT SERPL-MCNC: 6.2 G/DL (ref 6.4–8.2)
PROT SERPL-MCNC: 6.9 G/DL (ref 6.4–8.2)
PROT SERPL-MCNC: 6.9 G/DL (ref 6.4–8.2)
PROT SERPL-MCNC: 7.8 G/DL (ref 6.4–8.2)
PROT SERPL-MCNC: 7.8 G/DL (ref 6.4–8.2)
PROT UR STRIP-MCNC: 30 MG/DL
PROT UR STRIP-MCNC: NEGATIVE MG/DL
PROTEINASE3 AB SER IA-ACNC: 11.9 U/ML (ref 0–3.5)
PROTHROMBIN TIME: 11.2 SEC (ref 9–11.1)
Q-T INTERVAL, ECG07: 472 MS
Q-T INTERVAL, ECG07: 482 MS
QRS DURATION, ECG06: 84 MS
QRS DURATION, ECG06: 88 MS
QTC CALCULATION (BEZET), ECG08: 527 MS
QTC CALCULATION (BEZET), ECG08: 552 MS
RBC # BLD AUTO: 3.19 M/UL (ref 3.8–5.2)
RBC # BLD AUTO: 3.36 M/UL (ref 3.8–5.2)
RBC # BLD AUTO: 3.45 M/UL (ref 3.8–5.2)
RBC # BLD AUTO: 3.48 M/UL (ref 3.8–5.2)
RBC # BLD AUTO: 3.59 M/UL (ref 3.8–5.2)
RBC # BLD AUTO: 3.63 M/UL (ref 3.8–5.2)
RBC # BLD AUTO: 4.03 M/UL (ref 3.8–5.2)
RBC #/AREA URNS HPF: ABNORMAL /HPF (ref 0–5)
RBC #/AREA URNS HPF: ABNORMAL /HPF (ref 0–5)
RBC MORPH BLD: ABNORMAL
SAMPLES BEING HELD,HOLD: NORMAL
SERPINA1 GENE MUT ANL BLD/T: NORMAL
SERPINA1 GENE MUT TESTED BLD/T: NORMAL
SODIUM SERPL-SCNC: 136 MMOL/L (ref 136–145)
SODIUM SERPL-SCNC: 137 MMOL/L (ref 136–145)
SODIUM SERPL-SCNC: 139 MMOL/L (ref 136–145)
SODIUM SERPL-SCNC: 140 MMOL/L (ref 136–145)
SOURCE, COVRS: NORMAL
SP GR UR REFRACTOMETRY: 1.01 (ref 1–1.03)
SP GR UR REFRACTOMETRY: >1.03 (ref 1–1.03)
T4 FREE SERPL-MCNC: 1.8 NG/DL (ref 0.8–1.5)
TIBC SERPL-MCNC: 508 UG/DL (ref 250–450)
TRIGL SERPL-MCNC: 106 MG/DL (ref ?–150)
TSH SERPL DL<=0.05 MIU/L-ACNC: 1.41 UIU/ML (ref 0.36–3.74)
UA: UC IF INDICATED,UAUC: ABNORMAL
UROBILINOGEN UR QL STRIP.AUTO: 1 EU/DL (ref 0.2–1)
UROBILINOGEN UR QL STRIP.AUTO: 2 EU/DL (ref 0.2–1)
VENTRICULAR RATE, ECG03: 75 BPM
VENTRICULAR RATE, ECG03: 79 BPM
VIT B12 SERPL-MCNC: 174 PG/ML (ref 193–986)
VLDLC SERPL CALC-MCNC: 21.2 MG/DL
WBC # BLD AUTO: 3.5 K/UL (ref 3.6–11)
WBC # BLD AUTO: 3.8 K/UL (ref 3.6–11)
WBC # BLD AUTO: 4.2 K/UL (ref 3.6–11)
WBC # BLD AUTO: 4.3 K/UL (ref 3.6–11)
WBC # BLD AUTO: 4.3 K/UL (ref 3.6–11)
WBC # BLD AUTO: 5.4 K/UL (ref 3.6–11)
WBC # BLD AUTO: 6 K/UL (ref 3.6–11)
WBC URNS QL MICRO: ABNORMAL /HPF (ref 0–4)
WBC URNS QL MICRO: ABNORMAL /HPF (ref 0–4)

## 2021-01-01 PROCEDURE — G0153 HHCP-SVS OF S/L PATH,EA 15MN: HCPCS

## 2021-01-01 PROCEDURE — 74011000250 HC RX REV CODE- 250: Performed by: INTERNAL MEDICINE

## 2021-01-01 PROCEDURE — 3331090001 HH PPS REVENUE CREDIT

## 2021-01-01 PROCEDURE — G0152 HHCP-SERV OF OT,EA 15 MIN: HCPCS

## 2021-01-01 PROCEDURE — 3331090002 HH PPS REVENUE DEBIT

## 2021-01-01 PROCEDURE — 84443 ASSAY THYROID STIM HORMONE: CPT

## 2021-01-01 PROCEDURE — 74011250637 HC RX REV CODE- 250/637: Performed by: STUDENT IN AN ORGANIZED HEALTH CARE EDUCATION/TRAINING PROGRAM

## 2021-01-01 PROCEDURE — 36415 COLL VENOUS BLD VENIPUNCTURE: CPT

## 2021-01-01 PROCEDURE — 96374 THER/PROPH/DIAG INJ IV PUSH: CPT

## 2021-01-01 PROCEDURE — G0151 HHCP-SERV OF PT,EA 15 MIN: HCPCS

## 2021-01-01 PROCEDURE — 74011250637 HC RX REV CODE- 250/637: Performed by: INTERNAL MEDICINE

## 2021-01-01 PROCEDURE — 81001 URINALYSIS AUTO W/SCOPE: CPT

## 2021-01-01 PROCEDURE — 94640 AIRWAY INHALATION TREATMENT: CPT

## 2021-01-01 PROCEDURE — 97530 THERAPEUTIC ACTIVITIES: CPT

## 2021-01-01 PROCEDURE — 80053 COMPREHEN METABOLIC PANEL: CPT

## 2021-01-01 PROCEDURE — 93005 ELECTROCARDIOGRAM TRACING: CPT

## 2021-01-01 PROCEDURE — 76942 ECHO GUIDE FOR BIOPSY: CPT

## 2021-01-01 PROCEDURE — G8536 NO DOC ELDER MAL SCRN: HCPCS | Performed by: NURSE PRACTITIONER

## 2021-01-01 PROCEDURE — 400018 HH-NO PAY CLAIM PROCEDURE

## 2021-01-01 PROCEDURE — 99204 OFFICE O/P NEW MOD 45 MIN: CPT | Performed by: NURSE PRACTITIONER

## 2021-01-01 PROCEDURE — 97535 SELF CARE MNGMENT TRAINING: CPT | Performed by: OCCUPATIONAL THERAPIST

## 2021-01-01 PROCEDURE — 74011250637 HC RX REV CODE- 250/637: Performed by: HOSPITALIST

## 2021-01-01 PROCEDURE — 74011000250 HC RX REV CODE- 250: Performed by: NURSE PRACTITIONER

## 2021-01-01 PROCEDURE — 74011000636 HC RX REV CODE- 636: Performed by: PHYSICIAN ASSISTANT

## 2021-01-01 PROCEDURE — 82565 ASSAY OF CREATININE: CPT

## 2021-01-01 PROCEDURE — 3331090003 HH PPS REVENUE ADJ

## 2021-01-01 PROCEDURE — 99285 EMERGENCY DEPT VISIT HI MDM: CPT

## 2021-01-01 PROCEDURE — 74011250637 HC RX REV CODE- 250/637: Performed by: NURSE PRACTITIONER

## 2021-01-01 PROCEDURE — 74011250636 HC RX REV CODE- 250/636: Performed by: NURSE PRACTITIONER

## 2021-01-01 PROCEDURE — 94761 N-INVAS EAR/PLS OXIMETRY MLT: CPT

## 2021-01-01 PROCEDURE — 88313 SPECIAL STAINS GROUP 2: CPT

## 2021-01-01 PROCEDURE — 82550 ASSAY OF CK (CPK): CPT

## 2021-01-01 PROCEDURE — 74011250637 HC RX REV CODE- 250/637: Performed by: EMERGENCY MEDICINE

## 2021-01-01 PROCEDURE — G8400 PT W/DXA NO RESULTS DOC: HCPCS | Performed by: NURSE PRACTITIONER

## 2021-01-01 PROCEDURE — 82607 VITAMIN B-12: CPT

## 2021-01-01 PROCEDURE — 70450 CT HEAD/BRAIN W/O DYE: CPT

## 2021-01-01 PROCEDURE — 84439 ASSAY OF FREE THYROXINE: CPT

## 2021-01-01 PROCEDURE — G8432 DEP SCR NOT DOC, RNG: HCPCS | Performed by: NURSE PRACTITIONER

## 2021-01-01 PROCEDURE — 97162 PT EVAL MOD COMPLEX 30 MIN: CPT

## 2021-01-01 PROCEDURE — 77030038269 HC DRN EXT URIN PURWCK BARD -A

## 2021-01-01 PROCEDURE — G0463 HOSPITAL OUTPT CLINIC VISIT: HCPCS | Performed by: NURSE PRACTITIONER

## 2021-01-01 PROCEDURE — G0155 HHCP-SVS OF CSW,EA 15 MIN: HCPCS

## 2021-01-01 PROCEDURE — 93306 TTE W/DOPPLER COMPLETE: CPT

## 2021-01-01 PROCEDURE — 1090F PRES/ABSN URINE INCON ASSESS: CPT | Performed by: NURSE PRACTITIONER

## 2021-01-01 PROCEDURE — 82140 ASSAY OF AMMONIA: CPT

## 2021-01-01 PROCEDURE — 71260 CT THORAX DX C+: CPT

## 2021-01-01 PROCEDURE — 74170 CT ABD WO CNTRST FLWD CNTRST: CPT

## 2021-01-01 PROCEDURE — 65270000029 HC RM PRIVATE

## 2021-01-01 PROCEDURE — 80061 LIPID PANEL: CPT

## 2021-01-01 PROCEDURE — 85025 COMPLETE CBC W/AUTO DIFF WBC: CPT

## 2021-01-01 PROCEDURE — 74011250636 HC RX REV CODE- 250/636: Performed by: INTERNAL MEDICINE

## 2021-01-01 PROCEDURE — 83615 LACTATE (LD) (LDH) ENZYME: CPT

## 2021-01-01 PROCEDURE — G8427 DOCREV CUR MEDS BY ELIG CLIN: HCPCS | Performed by: NURSE PRACTITIONER

## 2021-01-01 PROCEDURE — 77030029684 HC NEB SM VOL KT MONA -A

## 2021-01-01 PROCEDURE — 3017F COLORECTAL CA SCREEN DOC REV: CPT | Performed by: NURSE PRACTITIONER

## 2021-01-01 PROCEDURE — 83605 ASSAY OF LACTIC ACID: CPT

## 2021-01-01 PROCEDURE — 82553 CREATINE MB FRACTION: CPT

## 2021-01-01 PROCEDURE — 82248 BILIRUBIN DIRECT: CPT

## 2021-01-01 PROCEDURE — 65660000000 HC RM CCU STEPDOWN

## 2021-01-01 PROCEDURE — 1101F PT FALLS ASSESS-DOCD LE1/YR: CPT | Performed by: NURSE PRACTITIONER

## 2021-01-01 PROCEDURE — 2709999900 HC NON-CHARGEABLE SUPPLY

## 2021-01-01 PROCEDURE — 400013 HH SOC

## 2021-01-01 PROCEDURE — 72125 CT NECK SPINE W/O DYE: CPT

## 2021-01-01 PROCEDURE — 74011000636 HC RX REV CODE- 636: Performed by: EMERGENCY MEDICINE

## 2021-01-01 PROCEDURE — 97165 OT EVAL LOW COMPLEX 30 MIN: CPT | Performed by: OCCUPATIONAL THERAPIST

## 2021-01-01 PROCEDURE — 77030040395 HC NDL BIOP COAX INTRO MRTM -B

## 2021-01-01 PROCEDURE — 74011250636 HC RX REV CODE- 250/636: Performed by: STUDENT IN AN ORGANIZED HEALTH CARE EDUCATION/TRAINING PROGRAM

## 2021-01-01 PROCEDURE — 87635 SARS-COV-2 COVID-19 AMP PRB: CPT

## 2021-01-01 PROCEDURE — 71045 X-RAY EXAM CHEST 1 VIEW: CPT

## 2021-01-01 PROCEDURE — 82533 TOTAL CORTISOL: CPT

## 2021-01-01 PROCEDURE — 83735 ASSAY OF MAGNESIUM: CPT

## 2021-01-01 PROCEDURE — 74011000250 HC RX REV CODE- 250: Performed by: RADIOLOGY

## 2021-01-01 PROCEDURE — 88307 TISSUE EXAM BY PATHOLOGIST: CPT

## 2021-01-01 PROCEDURE — 83010 ASSAY OF HAPTOGLOBIN QUANT: CPT

## 2021-01-01 PROCEDURE — G8419 CALC BMI OUT NRM PARAM NOF/U: HCPCS | Performed by: NURSE PRACTITIONER

## 2021-01-01 PROCEDURE — 74177 CT ABD & PELVIS W/CONTRAST: CPT

## 2021-01-01 PROCEDURE — 83880 ASSAY OF NATRIURETIC PEPTIDE: CPT

## 2021-01-01 RX ORDER — SODIUM CHLORIDE 9 MG/ML
100 INJECTION, SOLUTION INTRAVENOUS CONTINUOUS
Status: DISCONTINUED | OUTPATIENT
Start: 2021-01-01 | End: 2021-01-01

## 2021-01-01 RX ORDER — ACETAMINOPHEN 650 MG/1
650 SUPPOSITORY RECTAL
Status: DISCONTINUED | OUTPATIENT
Start: 2021-01-01 | End: 2021-01-01 | Stop reason: HOSPADM

## 2021-01-01 RX ORDER — SODIUM CHLORIDE 9 MG/ML
25 INJECTION, SOLUTION INTRAVENOUS CONTINUOUS
Status: DISCONTINUED | OUTPATIENT
Start: 2021-01-01 | End: 2021-01-01 | Stop reason: HOSPADM

## 2021-01-01 RX ORDER — SPIRONOLACTONE 25 MG/1
25 TABLET ORAL
Status: DISCONTINUED | OUTPATIENT
Start: 2021-01-01 | End: 2021-01-01 | Stop reason: HOSPADM

## 2021-01-01 RX ORDER — HYDROCHLOROTHIAZIDE 25 MG/1
25 TABLET ORAL DAILY
Status: DISCONTINUED | OUTPATIENT
Start: 2021-01-01 | End: 2021-01-01

## 2021-01-01 RX ORDER — SODIUM CHLORIDE 0.9 % (FLUSH) 0.9 %
5-40 SYRINGE (ML) INJECTION EVERY 8 HOURS
Status: DISCONTINUED | OUTPATIENT
Start: 2021-01-01 | End: 2021-01-01 | Stop reason: HOSPADM

## 2021-01-01 RX ORDER — BUDESONIDE 0.25 MG/2ML
250 INHALANT ORAL
Status: DISCONTINUED | OUTPATIENT
Start: 2021-01-01 | End: 2021-01-01 | Stop reason: HOSPADM

## 2021-01-01 RX ORDER — SPIRONOLACTONE 25 MG/1
25 TABLET ORAL DAILY
Status: ON HOLD | COMMUNITY
Start: 2021-01-01 | End: 2022-01-01 | Stop reason: SDUPTHER

## 2021-01-01 RX ORDER — MIDAZOLAM HYDROCHLORIDE 1 MG/ML
INJECTION, SOLUTION INTRAMUSCULAR; INTRAVENOUS
Status: DISCONTINUED
Start: 2021-01-01 | End: 2021-01-01 | Stop reason: HOSPADM

## 2021-01-01 RX ORDER — CYANOCOBALAMIN 1000 UG/ML
1000 INJECTION, SOLUTION INTRAMUSCULAR; SUBCUTANEOUS DAILY
Status: DISCONTINUED | OUTPATIENT
Start: 2021-01-01 | End: 2021-01-01

## 2021-01-01 RX ORDER — METOPROLOL TARTRATE 25 MG/1
25 TABLET, FILM COATED ORAL 2 TIMES DAILY
COMMUNITY
Start: 2021-01-01 | End: 2021-01-01

## 2021-01-01 RX ORDER — ATORVASTATIN CALCIUM 40 MG/1
80 TABLET, FILM COATED ORAL DAILY
Status: DISCONTINUED | OUTPATIENT
Start: 2021-01-01 | End: 2021-01-01 | Stop reason: HOSPADM

## 2021-01-01 RX ORDER — ACETAMINOPHEN 325 MG/1
650 TABLET ORAL
Status: DISCONTINUED | OUTPATIENT
Start: 2021-01-01 | End: 2021-01-01

## 2021-01-01 RX ORDER — POTASSIUM CHLORIDE 20 MEQ/1
40 TABLET, EXTENDED RELEASE ORAL
Status: COMPLETED | OUTPATIENT
Start: 2021-01-01 | End: 2021-01-01

## 2021-01-01 RX ORDER — FENTANYL CITRATE 50 UG/ML
100 INJECTION, SOLUTION INTRAMUSCULAR; INTRAVENOUS
Status: DISCONTINUED | OUTPATIENT
Start: 2021-01-01 | End: 2021-01-01 | Stop reason: HOSPADM

## 2021-01-01 RX ORDER — ALBUTEROL SULFATE 0.83 MG/ML
2.5 SOLUTION RESPIRATORY (INHALATION)
Status: DISCONTINUED | OUTPATIENT
Start: 2021-01-01 | End: 2021-01-01 | Stop reason: HOSPADM

## 2021-01-01 RX ORDER — SERTRALINE HYDROCHLORIDE 100 MG/1
50 TABLET, FILM COATED ORAL DAILY
Qty: 30 TABLET | Refills: 3 | Status: SHIPPED
Start: 2021-01-01

## 2021-01-01 RX ORDER — ONDANSETRON 2 MG/ML
4 INJECTION INTRAMUSCULAR; INTRAVENOUS
Status: DISCONTINUED | OUTPATIENT
Start: 2021-01-01 | End: 2021-01-01 | Stop reason: HOSPADM

## 2021-01-01 RX ORDER — MIDAZOLAM HYDROCHLORIDE 1 MG/ML
5 INJECTION, SOLUTION INTRAMUSCULAR; INTRAVENOUS
Status: DISCONTINUED | OUTPATIENT
Start: 2021-01-01 | End: 2021-01-01 | Stop reason: HOSPADM

## 2021-01-01 RX ORDER — LISINOPRIL 20 MG/1
20 TABLET ORAL DAILY
Status: DISCONTINUED | OUTPATIENT
Start: 2021-01-01 | End: 2021-01-01

## 2021-01-01 RX ORDER — LISINOPRIL 10 MG/1
10 TABLET ORAL DAILY
Status: DISCONTINUED | OUTPATIENT
Start: 2021-01-01 | End: 2021-01-01

## 2021-01-01 RX ORDER — ACETAMINOPHEN 325 MG/1
650 TABLET ORAL
Status: DISCONTINUED | OUTPATIENT
Start: 2021-01-01 | End: 2021-01-01 | Stop reason: HOSPADM

## 2021-01-01 RX ORDER — LEVOTHYROXINE SODIUM 137 UG/1
112 TABLET ORAL
COMMUNITY
Start: 2021-01-01 | End: 2022-01-01

## 2021-01-01 RX ORDER — FAMOTIDINE 20 MG/1
20 TABLET, FILM COATED ORAL 2 TIMES DAILY
Status: DISCONTINUED | OUTPATIENT
Start: 2021-01-01 | End: 2021-01-01 | Stop reason: HOSPADM

## 2021-01-01 RX ORDER — LEVOTHYROXINE SODIUM 112 UG/1
112 TABLET ORAL
Status: DISCONTINUED | OUTPATIENT
Start: 2021-01-01 | End: 2021-01-01

## 2021-01-01 RX ORDER — ONDANSETRON 4 MG/1
4 TABLET, ORALLY DISINTEGRATING ORAL
Status: DISCONTINUED | OUTPATIENT
Start: 2021-01-01 | End: 2021-01-01 | Stop reason: HOSPADM

## 2021-01-01 RX ORDER — ASPIRIN 81 MG/1
81 TABLET ORAL DAILY
Status: DISCONTINUED | OUTPATIENT
Start: 2021-01-01 | End: 2021-01-01 | Stop reason: HOSPADM

## 2021-01-01 RX ORDER — LISINOPRIL AND HYDROCHLOROTHIAZIDE 20; 25 MG/1; MG/1
1 TABLET ORAL DAILY
COMMUNITY
Start: 2021-01-01 | End: 2021-01-01

## 2021-01-01 RX ORDER — CYANOCOBALAMIN 1000 UG/ML
1000 INJECTION, SOLUTION INTRAMUSCULAR; SUBCUTANEOUS DAILY
Status: COMPLETED | OUTPATIENT
Start: 2021-01-01 | End: 2021-01-01

## 2021-01-01 RX ORDER — LEVOTHYROXINE SODIUM 100 UG/1
100 TABLET ORAL
Status: DISCONTINUED | OUTPATIENT
Start: 2021-01-01 | End: 2021-01-01 | Stop reason: HOSPADM

## 2021-01-01 RX ORDER — SODIUM CHLORIDE 0.9 % (FLUSH) 0.9 %
5-40 SYRINGE (ML) INJECTION AS NEEDED
Status: DISCONTINUED | OUTPATIENT
Start: 2021-01-01 | End: 2021-01-01 | Stop reason: HOSPADM

## 2021-01-01 RX ORDER — ENOXAPARIN SODIUM 100 MG/ML
40 INJECTION SUBCUTANEOUS DAILY
Status: DISCONTINUED | OUTPATIENT
Start: 2021-01-01 | End: 2021-01-01

## 2021-01-01 RX ORDER — LIDOCAINE HYDROCHLORIDE 10 MG/ML
10 INJECTION, SOLUTION EPIDURAL; INFILTRATION; INTRACAUDAL; PERINEURAL
Status: COMPLETED | OUTPATIENT
Start: 2021-01-01 | End: 2021-01-01

## 2021-01-01 RX ORDER — SERTRALINE HYDROCHLORIDE 50 MG/1
50 TABLET, FILM COATED ORAL DAILY
Status: DISCONTINUED | OUTPATIENT
Start: 2021-01-01 | End: 2021-01-01 | Stop reason: HOSPADM

## 2021-01-01 RX ORDER — METOPROLOL TARTRATE 25 MG/1
25 TABLET, FILM COATED ORAL 2 TIMES DAILY
Status: DISCONTINUED | OUTPATIENT
Start: 2021-01-01 | End: 2021-01-01

## 2021-01-01 RX ORDER — MAGNESIUM SULFATE HEPTAHYDRATE 40 MG/ML
2 INJECTION, SOLUTION INTRAVENOUS
Status: COMPLETED | OUTPATIENT
Start: 2021-01-01 | End: 2021-01-01

## 2021-01-01 RX ORDER — BUDESONIDE 0.25 MG/2ML
250 INHALANT ORAL 2 TIMES DAILY
Status: DISCONTINUED | OUTPATIENT
Start: 2021-01-01 | End: 2021-01-01

## 2021-01-01 RX ORDER — ONDANSETRON 2 MG/ML
4 INJECTION INTRAMUSCULAR; INTRAVENOUS
Status: DISCONTINUED | OUTPATIENT
Start: 2021-01-01 | End: 2021-01-01

## 2021-01-01 RX ORDER — CYANOCOBALAMIN 1000 UG/ML
INJECTION, SOLUTION INTRAMUSCULAR; SUBCUTANEOUS
Qty: 1 ML | Refills: 0 | Status: SHIPPED
Start: 2021-01-01

## 2021-01-01 RX ORDER — POLYETHYLENE GLYCOL 3350 17 G/17G
17 POWDER, FOR SOLUTION ORAL DAILY PRN
Status: DISCONTINUED | OUTPATIENT
Start: 2021-01-01 | End: 2021-01-01 | Stop reason: HOSPADM

## 2021-01-01 RX ORDER — POTASSIUM CHLORIDE 750 MG/1
40 TABLET, FILM COATED, EXTENDED RELEASE ORAL ONCE
Status: COMPLETED | OUTPATIENT
Start: 2021-01-01 | End: 2021-01-01

## 2021-01-01 RX ORDER — HYDRALAZINE HYDROCHLORIDE 20 MG/ML
10 INJECTION INTRAMUSCULAR; INTRAVENOUS
Status: DISCONTINUED | OUTPATIENT
Start: 2021-01-01 | End: 2021-01-01 | Stop reason: HOSPADM

## 2021-01-01 RX ORDER — ATORVASTATIN CALCIUM 80 MG/1
80 TABLET, FILM COATED ORAL DAILY
Status: ON HOLD | COMMUNITY
Start: 2021-01-01 | End: 2022-01-01 | Stop reason: SDUPTHER

## 2021-01-01 RX ORDER — SERTRALINE HYDROCHLORIDE 100 MG/1
TABLET, FILM COATED ORAL
Status: ON HOLD | COMMUNITY
Start: 2021-01-01 | End: 2021-01-01 | Stop reason: SDUPTHER

## 2021-01-01 RX ADMIN — Medication 10 ML: at 17:55

## 2021-01-01 RX ADMIN — ASPIRIN 81 MG: 81 TABLET, COATED ORAL at 09:57

## 2021-01-01 RX ADMIN — FAMOTIDINE 20 MG: 20 TABLET, FILM COATED ORAL at 09:35

## 2021-01-01 RX ADMIN — FAMOTIDINE 20 MG: 20 TABLET, FILM COATED ORAL at 09:34

## 2021-01-01 RX ADMIN — BUDESONIDE 250 MCG: 0.25 INHALANT RESPIRATORY (INHALATION) at 06:17

## 2021-01-01 RX ADMIN — SERTRALINE 50 MG: 50 TABLET, FILM COATED ORAL at 08:33

## 2021-01-01 RX ADMIN — Medication 10 ML: at 01:11

## 2021-01-01 RX ADMIN — POTASSIUM CHLORIDE 40 MEQ: 20 TABLET, EXTENDED RELEASE ORAL at 20:37

## 2021-01-01 RX ADMIN — SODIUM CHLORIDE 100 ML/HR: 9 INJECTION, SOLUTION INTRAVENOUS at 03:38

## 2021-01-01 RX ADMIN — FENTANYL CITRATE 50 MCG: 50 INJECTION, SOLUTION INTRAMUSCULAR; INTRAVENOUS at 08:30

## 2021-01-01 RX ADMIN — Medication 10 ML: at 05:53

## 2021-01-01 RX ADMIN — Medication 10 ML: at 18:01

## 2021-01-01 RX ADMIN — LEVOTHYROXINE SODIUM 100 MCG: 0.1 TABLET ORAL at 05:02

## 2021-01-01 RX ADMIN — BUDESONIDE 250 MCG: 0.25 INHALANT RESPIRATORY (INHALATION) at 07:36

## 2021-01-01 RX ADMIN — SODIUM CHLORIDE 100 ML/HR: 9 INJECTION, SOLUTION INTRAVENOUS at 14:06

## 2021-01-01 RX ADMIN — SERTRALINE 50 MG: 50 TABLET, FILM COATED ORAL at 09:34

## 2021-01-01 RX ADMIN — LISINOPRIL 20 MG: 20 TABLET ORAL at 08:33

## 2021-01-01 RX ADMIN — ALBUTEROL SULFATE 2.5 MG: 2.5 SOLUTION RESPIRATORY (INHALATION) at 06:17

## 2021-01-01 RX ADMIN — BUDESONIDE 250 MCG: 0.25 INHALANT RESPIRATORY (INHALATION) at 19:37

## 2021-01-01 RX ADMIN — Medication 10 ML: at 18:16

## 2021-01-01 RX ADMIN — FAMOTIDINE 20 MG: 20 TABLET, FILM COATED ORAL at 09:58

## 2021-01-01 RX ADMIN — FAMOTIDINE 20 MG: 20 TABLET, FILM COATED ORAL at 18:16

## 2021-01-01 RX ADMIN — ASPIRIN 81 MG: 81 TABLET, COATED ORAL at 09:34

## 2021-01-01 RX ADMIN — SERTRALINE 50 MG: 50 TABLET, FILM COATED ORAL at 09:57

## 2021-01-01 RX ADMIN — LEVOTHYROXINE SODIUM 100 MCG: 0.1 TABLET ORAL at 05:12

## 2021-01-01 RX ADMIN — LIDOCAINE HYDROCHLORIDE 10 ML: 10 INJECTION, SOLUTION EPIDURAL; INFILTRATION; INTRACAUDAL; PERINEURAL at 08:30

## 2021-01-01 RX ADMIN — Medication 10 ML: at 06:14

## 2021-01-01 RX ADMIN — Medication 5 ML: at 06:00

## 2021-01-01 RX ADMIN — CYANOCOBALAMIN 1000 MCG: 1000 INJECTION, SOLUTION INTRAMUSCULAR; SUBCUTANEOUS at 12:38

## 2021-01-01 RX ADMIN — BUDESONIDE 250 MCG: 0.25 INHALANT RESPIRATORY (INHALATION) at 09:42

## 2021-01-01 RX ADMIN — ATORVASTATIN CALCIUM 80 MG: 40 TABLET, FILM COATED ORAL at 09:34

## 2021-01-01 RX ADMIN — LEVOTHYROXINE SODIUM 100 MCG: 0.1 TABLET ORAL at 05:53

## 2021-01-01 RX ADMIN — Medication 10 ML: at 21:40

## 2021-01-01 RX ADMIN — BUDESONIDE 250 MCG: 0.25 INHALANT RESPIRATORY (INHALATION) at 07:23

## 2021-01-01 RX ADMIN — Medication 10 ML: at 05:02

## 2021-01-01 RX ADMIN — BUDESONIDE 250 MCG: 0.25 INHALANT RESPIRATORY (INHALATION) at 21:48

## 2021-01-01 RX ADMIN — Medication 10 ML: at 17:20

## 2021-01-01 RX ADMIN — HYDROCHLOROTHIAZIDE 25 MG: 25 TABLET ORAL at 08:32

## 2021-01-01 RX ADMIN — SODIUM CHLORIDE 25 ML/HR: 9 INJECTION, SOLUTION INTRAVENOUS at 08:00

## 2021-01-01 RX ADMIN — BUDESONIDE 250 MCG: 0.25 INHALANT RESPIRATORY (INHALATION) at 20:03

## 2021-01-01 RX ADMIN — FAMOTIDINE 20 MG: 20 TABLET, FILM COATED ORAL at 17:38

## 2021-01-01 RX ADMIN — METOPROLOL TARTRATE 25 MG: 25 TABLET, FILM COATED ORAL at 08:32

## 2021-01-01 RX ADMIN — Medication 10 ML: at 22:19

## 2021-01-01 RX ADMIN — FAMOTIDINE 20 MG: 20 TABLET, FILM COATED ORAL at 17:56

## 2021-01-01 RX ADMIN — CYANOCOBALAMIN 1000 MCG: 1000 INJECTION, SOLUTION INTRAMUSCULAR; SUBCUTANEOUS at 11:13

## 2021-01-01 RX ADMIN — ATORVASTATIN CALCIUM 80 MG: 40 TABLET, FILM COATED ORAL at 08:34

## 2021-01-01 RX ADMIN — HYDROCHLOROTHIAZIDE 25 MG: 25 TABLET ORAL at 09:33

## 2021-01-01 RX ADMIN — FAMOTIDINE 20 MG: 20 TABLET, FILM COATED ORAL at 10:18

## 2021-01-01 RX ADMIN — SERTRALINE 50 MG: 50 TABLET, FILM COATED ORAL at 10:01

## 2021-01-01 RX ADMIN — METOPROLOL TARTRATE 25 MG: 25 TABLET, FILM COATED ORAL at 09:34

## 2021-01-01 RX ADMIN — POTASSIUM CHLORIDE 40 MEQ: 750 TABLET, FILM COATED, EXTENDED RELEASE ORAL at 11:29

## 2021-01-01 RX ADMIN — SODIUM CHLORIDE 100 ML/HR: 9 INJECTION, SOLUTION INTRAVENOUS at 00:49

## 2021-01-01 RX ADMIN — ATORVASTATIN CALCIUM 80 MG: 40 TABLET, FILM COATED ORAL at 09:58

## 2021-01-01 RX ADMIN — SERTRALINE 50 MG: 50 TABLET, FILM COATED ORAL at 10:18

## 2021-01-01 RX ADMIN — BUDESONIDE 250 MCG: 0.25 INHALANT RESPIRATORY (INHALATION) at 17:26

## 2021-01-01 RX ADMIN — FAMOTIDINE 20 MG: 20 TABLET, FILM COATED ORAL at 18:01

## 2021-01-01 RX ADMIN — Medication 10 ML: at 05:12

## 2021-01-01 RX ADMIN — MIDAZOLAM HYDROCHLORIDE 2 MG: 1 INJECTION, SOLUTION INTRAMUSCULAR; INTRAVENOUS at 08:27

## 2021-01-01 RX ADMIN — FAMOTIDINE 20 MG: 20 TABLET, FILM COATED ORAL at 08:33

## 2021-01-01 RX ADMIN — LEVOTHYROXINE SODIUM 112 MCG: 0.11 TABLET ORAL at 08:34

## 2021-01-01 RX ADMIN — IOPAMIDOL 100 ML: 755 INJECTION, SOLUTION INTRAVENOUS at 17:58

## 2021-01-01 RX ADMIN — Medication 10 ML: at 22:14

## 2021-01-01 RX ADMIN — ASPIRIN 81 MG: 81 TABLET, COATED ORAL at 10:18

## 2021-01-01 RX ADMIN — SODIUM CHLORIDE 100 ML/HR: 9 INJECTION, SOLUTION INTRAVENOUS at 13:44

## 2021-01-01 RX ADMIN — MAGNESIUM SULFATE HEPTAHYDRATE 2 G: 2 INJECTION, SOLUTION INTRAVENOUS at 20:20

## 2021-01-01 RX ADMIN — FAMOTIDINE 20 MG: 20 TABLET, FILM COATED ORAL at 17:20

## 2021-01-01 RX ADMIN — IOPAMIDOL 100 ML: 755 INJECTION, SOLUTION INTRAVENOUS at 21:46

## 2021-01-01 RX ADMIN — METOPROLOL TARTRATE 25 MG: 25 TABLET, FILM COATED ORAL at 17:38

## 2021-01-01 RX ADMIN — ASPIRIN 81 MG: 81 TABLET, COATED ORAL at 09:35

## 2021-01-01 RX ADMIN — Medication 10 ML: at 01:10

## 2021-01-01 RX ADMIN — ASPIRIN 81 MG: 81 TABLET, COATED ORAL at 08:32

## 2021-01-01 RX ADMIN — SODIUM CHLORIDE 100 ML/HR: 9 INJECTION, SOLUTION INTRAVENOUS at 02:10

## 2021-01-01 RX ADMIN — ATORVASTATIN CALCIUM 80 MG: 40 TABLET, FILM COATED ORAL at 10:18

## 2021-01-01 RX ADMIN — Medication 10 ML: at 17:39

## 2021-01-01 RX ADMIN — POTASSIUM CHLORIDE 40 MEQ: 20 TABLET, EXTENDED RELEASE ORAL at 23:23

## 2021-08-05 NOTE — DISCHARGE INSTRUCTIONS
[facility]  Radiology Department  [phone]      Radiologist:    Date: 08/05/2021      Liver Biopsy Discharge Instructions      You may have an aching pain in the biopsy site tonight. Take Tylenol if allowed, as directed on the label, for pain or discomfort. Avoid ibuprofen (Advil, Motrin) and aspirin for the next 48 hours as these drugs may increase your risk of bleeding. Resume your previous diet and resume your prescribed medications. You have been given sedating medications today. Go home and rest.  Do not drive or make any important decisions. Avoid strenuous activity,  do not lift anything heavier than a small grocery bag (10 pounds) and avoid excessive twisting and reaching for the next 5 days. If you experience severe sweating, severe abdominal pain, dizziness or faintness, go to the nearest Emergency Room immediately. Pain under the left collar bone is normal.    Watch for signs of infection at biopsy site:  redness, pain, drainage, fever chills. If this occurs, call you doctor. Follow up with your ordering physician as previously discussed to receive results. If you have any questions or concerns, please call [phone] and ask to speak to a radiology nurse.

## 2021-08-05 NOTE — PROGRESS NOTES
Name of procedure:Random Liver Biopsy    Sedation medications given: Versed 2mg, Fentanyl 50 mcg    Sedation tolerated: Well    Total Sedation time: 20 minutes    Vital Signs: Stable    Fluids removed: None    Samples sent to lab: YES    Any complications related to procedure: None    Post Procedure Care Needed/order sets placed in [facility].

## 2021-08-05 NOTE — PROGRESS NOTES
Biju Sharp at bedside to obtain consent for liver biopsy. Procedure explained with opportunity to ask questions. Patient states understanding of procedure prior to signing consent. Discharge instructions and expectations reviewed with patient. Written copy given to patient. RN to review again post procedure before discharge.

## 2021-08-05 NOTE — H&P
Interventional Radiology History and Physical      Patient: Florencio Mcgrath 73 y.o. female  [id number]    Consult Requested by: JORGE LUIS Bellamy    Chief Complaint: elevated liver enzymes    History of Present Illness: 72 yo female with elevated LFTs. PMH significant for MI with cardiac stents HTN, HLD, thyroid DO, former smoker. Radiology consulted for image-guided liver biopsy. History:  No past medical history on file. No family history on file. Social History     Socioeconomic History    Marital status: SINGLE     Spouse name: Not on file    Number of children: Not on file    Years of education: Not on file    Highest education level: Not on file   Occupational History    Not on file   Tobacco Use    Smoking status: Not on file   Substance and Sexual Activity    Alcohol use: Not on file    Drug use: Not on file    Sexual activity: Not on file   Other Topics Concern    Not on file   Social History Narrative    Not on file     Social Determinants of Health     Financial Resource Strain:     Difficulty of Paying Living Expenses:    Food Insecurity:     Worried About Running Out of Food in the Last Year:     [address] in the Last Year:    Transportation Needs:     Lack of Transportation (Medical):  Lack of Transportation (Non-Medical):    Physical Activity:     Days of Exercise per Week:     Minutes of Exercise per Session:    Stress:     Feeling of Stress :    Social Connections:     Frequency of Communication with Friends and Family:     Frequency of Social Gatherings with Friends and Family:     Attends Christianity Services:     Active Member of Clubs or Organizations:     Attends Club or Organization Meetings:     Marital Status:    Intimate Partner Violence:     Fear of Current or Ex-Partner:     Emotionally Abused:     Physically Abused:     Sexually Abused: Allergies: No Known Allergies    Current Medications:  No current outpatient medications on file. Current Facility-Administered Medications   Medication Dose Route Frequency    midazolam (VERSED) 1 mg/mL injection        fentaNYL citrate (PF) injection 100 mcg  100 mcg IntraVENous Multiple    0.9% sodium chloride infusion  25 mL/hr IntraVENous CONTINUOUS    midazolam (PF) (VERSED) injection 5 mg  5 mg IntraVENous Rad Multiple        Review of Systems:  Patient denies fever, chills, cough, headache, vision changes, difficulty swallowing, shortness of breath, chest pain, abdominal pain, nausea, vomiting, changes in bladder or bowel habits, extremity weakness, numbness, tingling. Intermittent ankle swelling. The remainder of the review of systems is negative for any additional contributing elements. Physical Exam:  Blood pressure (!) 158/87, pulse 65, temperature 98.3 °F (36.8 °C), resp. rate 16, height 5' 3\" (1.6 m), weight 83.5 kg (184 lb), SpO2 99 %, not currently breastfeeding. GENERAL: alert, cooperative, no distress, appears stated age,   LUNG: clear to auscultation bilaterally,   HEART: regular rate and rhythm,   ABDOMEN: soft, non-tender. Bowel sounds normal.   EXTREMITIES:  extremities normal, atraumatic, no cyanosis, mild bilat ankle edema   NEUROLOGIC: AOx3. Cranial nerves 2-12 and sensation grossly intact. Laboratory:    No results for input(s): HGB, HGBEXT, HCT, HCTEXT, WBC, PLT, PLTEXT, INR, BUN, CREA, K, CRCLT, HGBEXT, HCTEXT, PLTEXT, INREXT in the last 72 hours. No lab exists for component: PTT, PT    Imaging:  No results found. Impression/Plan:  Patient has been evaluated and deemed an appropriate candidate for intravenous sedation. Based on history and presentation she is a candidate for US-guided liver biopsy. The above procedure was explained to the patient/consenting party. Benefits, risks and alternative therapies reviewed and all questions answered to her satisfaction. At this time she wishes to proceed.      Please note that Dr. Soraya Muñoz participated in the provision of these services. We appreciate the kind consultation and the opportunity to participate in Jazmin Yaohaylie Schroeder's care. Clint Cowart PA-C  Interventional Radiology  [facility].  [facility]  [phone]  [facility] [phone]      CC:  Jatinder Castro

## 2021-08-05 NOTE — PROGRESS NOTES
Pt continues to rest in bed with eyes closed. Dressing to RUQ remains clean and dry. Pt has no complaints of pain.

## 2021-10-04 NOTE — PROGRESS NOTES
Delpha Osgood, MD, Rodolfo [place], MD Louisa Garcia, MILDRED Wong, [facility]     Mago Hutton, [facility]   Kathrine Bonner ANGELITA-NOAH Noonan, [facility]       Aneta Mendieta [facility] 136    at [address], [address] [place] [address], [facility] 22.    [phone]    FAX: [address] [place] [address]    at [address], [address]    [phone]    FAX: [phone]     Patient Care Team:  Navdeep Cespedes MD as PCP - General (Family Medicine)    Patient Active Problem List   Diagnosis Code    Breast cancer ([facility] [address] C50.919    HTN (hypertension) I10    Acquired hypothyroidism E03.9    Elevated LFTs R79.89    Hyperlipidemia E78.5     * addended 11/2//2021 to include [facility] records*    The clinicians listed above have asked me to see [facility] \"Kathy\" in consultation regarding management of liver disease of unknown etiology. No medical records were available for review when the patient was here for the appointment. I am able to see pathology and CT scan only. The patient is a 73 y.o.  female who was found to have chronic liver disease in 2021   when she was found to have elevated LFTs. Per she and her sister, she started having confusion, balance issues and falls. She was found to have elevated LFTs and then sent to [address] [place] [address] for work up who referred her here. The patient and sister's understanding is she has \"the beginning of cirrhosis\" and no etiology for balance issues/falls has been determined. A liver biopsy was performed in 8/2021. This demonstrated bridging fibrosis. Serologic evaluation for markers of chronic liver disease has either not been performed or the results are not available.       The patient has not developed any of the major complications of cirrhosis to date. The patient has the following symptoms which are thought to be due to the liver disease: fatigue, problems concentrating. The patient is not currently experiencing the following symptoms of liver disease: pain in the right side over the liver, yellowing of the eyes or skin, itching or swelling of the abdomen. The patient has moderate limitations in functional activities which can be attributed to the liver disease and/or other medical problems not related to the liver disease. ASSESSMENT AND PLAN:  Elevated liver enzymes with F3 fibrosis  Unclear etiology. She was told she has low platelets and \"the beginning of cirrhosis. \"    Will perform laboratory testing to monitor liver function and degree of liver injury. This included BMP, hepatic panel, CBC with platelet count and INR. Serologic testing for causes of chronic liver disease was ordered. The patient was counseled regarding the need to maintain sodium restriction and the types of foods containing high amounts of sodium to be avoided. Assessment of fibrosis will be confirmed with an in-office FibroScan at the next visit. That way, we can use the FibroScan to monitor fibrosis progression or regression over time. AMS/confusion/balance issues  With her history of breast cancer, I'm going to order a non-con head CT. Her mental/balance complaints are not consistent with HE. She had negative asterixis on exam. She is able to speak words at a normal temo. I'm going to order an ammonia. Screening for esophageal varices   The patient has not had an EGD to screen for varices. Will see where FibroScan EkPa and platelet count is. Hepatic encephalopathy   I don't think her confusion is HE but am going to order an ammonia today. Thrombocytopenia   Likely secondary to cirrhosis. Need to see actual count. May need heme/onc consult.      Screening for hepatocellular carcinoma  CT scan 7/2021 no tumor  AFP ordered today. Treatment of other medical problems in patients with chronic liver disease  There are no contraindications for the patient to take most medications necessary for treatment of other medical issues. I would avoid benzodiazepines due to her intermittent confusion and NSAIDs which are associated with a higher rate of developing KASSIE. The patient can take any medications utilized for treatment of DM and/or statins to treat hypercholesterolemia. The patient does not consume alcohol on a daily basis. Normal doses of acetaminophen, as recommended on the label of the bottle, are not hepatotoxic except in the setting of daily alcohol use. Even patients with cirrhosis can utilize acetaminophen for pain. Counseling for alcohol in patients with chronic liver disease  The patient was counseled regarding alcohol consumption and the effect of alcohol on chronic liver disease. The patient does not consume any significant amount of alcohol. Osteoporosis  The risk of osteoporosis is increased in patients with cirrhosis. I'm not convinced she has cirrhosis yet but with her increased falls and balance issues, it is a good idea for a DEXA scan. This should be ordered by the patient's primary care physician. Vaccinations   The need for vaccination against viral hepatitis A and B will be assessed with serologic and instituted as appropriate. Routine vaccinations against other bacterial and viral agents can be performed as indicated. Annual flu vaccination should be administered if indicated. ALLERGIES  No Known Allergies     MEDICATIONS  Current Outpatient Medications on File Prior to Visit   Medication Sig Dispense Refill    atorvastatin (LIPITOR) 80 mg tablet       levothyroxine (SYNTHROID) 137 mcg tablet       lisinopril-hydroCHLOROthiazide (PRINZIDE, ZESTORETIC) 20-25 mg per tablet Take 1 Tablet by mouth daily.       metoprolol tartrate (LOPRESSOR) 25 mg tablet Take 25 mg by mouth two (2) times a day.  sertraline (ZOLOFT) 100 mg tablet TAKE 1 AND 1/2 TABLETS BY MOUTH DAILY      spironolactone (ALDACTONE) 25 mg tablet        No current facility-administered medications on file prior to visit. FAMILY HISTORY:  There is no family history of liver disease. There is no family history of immune disorders. SOCIAL HISTORY:  The patient is single. She has no kids  She quit smoking 5/2015. Very seldom alcohol use. She was a [employer]  for 23 years, then worked for [employer] for 8 1/2 years. Retired in 2017. PHYSICAL EXAMINATION:  Visit Vitals  /65   Pulse 62   Temp 97.7 °F (36.5 °C)   Resp 20   Ht 5' 3\" (1.6 m)   Wt 160 lb (72.6 kg)   SpO2 96%   BMI 28.34 kg/m²     General: No acute distress. Walks with a cane. Eyes: Sclera anicteric. ENT: No oral lesions. Nodes: No adenopathy. Skin: No spider angiomata. No jaundice. No palmar erythema. Respiratory: Lungs clear to auscultation. Cardiovascular: Regular heart rate. No murmurs. No JVD. Abdomen: Soft non-tender. Liver size normal to percussion/palpation. Spleen not palpable. No obvious ascites. Extremities: No edema. No muscle wasting. No gross arthritic changes. Neurologic: Alert and oriented. Cranial nerves grossly intact. No asterixis. LABORATORY STUDIES:  From 7/2021  AST/ALT/ALP/T Bili/ALB: 75/37/118/0.3/3.6  WBC/HB/PLT/INR: 5.1/12.1/125/1.0  NA/BUN/CREAT:      SEROLOGIES:  7/14/2021  Actin negative  OBIE negative    HAV total positive  HBV sAb non reactive  Iron sat: 15  AMA negative    LIVER HISTOLOGY:  8/5/2021. Liver biopsy. Fibrosis: severe, numerous bridges and septae, score 3. Steatosis: Minimal. Ballooning hepatocytes: present. Portal inflammation: mild mononuclear inflammatory infiltrate. Duct injury: Not identified. Ductular reaction: present. Lobular inflammation: not identified. Stainable iron: absent.  Reticulin: preserved reticulin architecture. ENDOSCOPIC PROCEDURES:  Not available or performed    RADIOLOGY:  7/26/2021. CT abdomen/pelvis with contrast. Liver: subtle nodularity,no enhancing mass lesion. BILIARY TREE: small stones. Gallbladder mildly distended CBD is not dilated. SPLEEN: within normal limits. PANCREAS: No mass or ductal dilatation. OTHER TESTING:  Not available or performed    FOLLOW-UP:  All of the issues listed above in the assessment and plan were discussed with the patient. All questions were answered. The patient expressed a clear understanding of the above. [address] [id number] in 6 weeks for FibroScan, to review all data and determine the treatment plan. I will review all results at the next office visit. I will call to review the results of the head CT. If any of the blood tests warrant intervention prior to our next office visit, I will call she and her sister and explain that and any intervention/medication needed. The patient is ok with me updating Marley Brito when I update patient, due to her intermittent confusion. Anna Machuca, [facility]  Liver [address] [place] [address].  [phone]      We had requested [facility] records but had an issue with our fax today and it was not receiving incoming faxes. By the time of writing this note, I had not received any records.

## 2021-10-04 NOTE — PROGRESS NOTES
Landen Webb is a 73 y.o. female    Chief Complaint   Patient presents with    New Patient     Cirrhosis of the liver      1. Have you been to the ER, urgent care clinic since your last visit? Hospitalized since your last visit? No     2. Have you seen or consulted any other health care providers outside of the [address] [place] [address] since your last visit? Include any pap smears or colon screening.   No     Visit Vitals  /65   Pulse 62   Temp 97.7 °F (36.5 °C)   Resp 20   Ht 5' 3\" (1.6 m)   Wt 160 lb (72.6 kg)   SpO2 96%   BMI 28.34 kg/m²

## 2021-11-23 NOTE — ED PROVIDER NOTES
EMERGENCY DEPARTMENT HISTORY AND PHYSICAL EXAM      Date: 11/23/2021  Patient Name: Kennedy Johnson    History of Presenting Illness     Chief Complaint   Patient presents with    Fall     Pt comes to ED from home via EMS. Pt fell going to get her mail. EMS reports pt was sitting upon arrival and was altered (not knowing the date.) Pt presents A&Ox4. She reports falling down 5 outside steps. She denies change in LOC. HPI: Kennedy Johnson, 73 y.o. female presents to the ED with cc of fall. She fell today, onto her knees and did hit her head, no loss of consciousness. She reports a scrape on her left knee. Currently denies any headache. No neck or back pain, no focal weakness in the arms or legs. She has been having falls over the last several months, 2 falls in the past week, she says that she feels \"wobbly\" on her feet and sometimes loses her balance. She has been seen by her primary care doctor for this, had recent blood work done which showed no abnormalities per her sister. No recent illnesses, no fevers, coughing, vomiting or diarrhea. She does not take any blood thinners. There are no other complaints, changes, or physical findings at this time. PCP: Jeanne Peace MD    No current facility-administered medications on file prior to encounter. Current Outpatient Medications on File Prior to Encounter   Medication Sig Dispense Refill    aspirin delayed-release 81 mg tablet Take 81 mg by mouth daily.  budesonide (PULMICORT) 180 mcg/actuation aepb inhaler Take 2 Puffs by inhalation daily.  albuterol (PROVENTIL VENTOLIN) 2.5 mg /3 mL (0.083 %) nebu Take 2.5 mg by inhalation every four (4) hours as needed for Wheezing.  atorvastatin (LIPITOR) 80 mg tablet Take 80 mg by mouth daily.  levothyroxine (SYNTHROID) 137 mcg tablet Take 112 mcg by mouth Daily (before breakfast).       lisinopril-hydroCHLOROthiazide (PRINZIDE, ZESTORETIC) 20-25 mg per tablet Take 1 Tablet by mouth daily.  metoprolol tartrate (LOPRESSOR) 25 mg tablet Take 25 mg by mouth two (2) times a day.  sertraline (ZOLOFT) 100 mg tablet TAKE 1 AND 1/2 TABLETS BY MOUTH DAILY      spironolactone (ALDACTONE) 25 mg tablet Take 25 mg by mouth daily as needed for PRN Reason (Other) (for swelling in ankles or abdomen). Past History     Past Medical History:  Past Medical History:   Diagnosis Date    Breast cancer ([facility] [address]     HTN (hypertension)     Hyperlipidemia        Past Surgical History:  Past Surgical History:   Procedure Laterality Date    HX BILATERAL MASTECTOMY         Family History:  No family history on file. Social History:  Social History     Tobacco Use    Smoking status: Former Smoker     Packs/day: 1.00     Years: 42.00     Pack years: 42.00     Types: Cigarettes     Quit date: 2015     Years since quittin.5    Smokeless tobacco: Never Used   Substance Use Topics    Alcohol use: Never    Drug use: Never       Allergies:  No Known Allergies      Review of Systems   no fever  No ear pain  No eye pain  no shortness of breath  no chest pain  no abdominal pain  no dysuria  no leg pain  No rash  No lymphadenopathy  No weight loss    Physical Exam   Physical Exam  Constitutional:       General: She is not in acute distress. Appearance: She is not toxic-appearing. HENT:      Head: Normocephalic. Mouth/Throat:      Mouth: Mucous membranes are moist.   Eyes:      Extraocular Movements: Extraocular movements intact. Comments: Disconjugate gaze, baseline per patient   Cardiovascular:      Rate and Rhythm: Normal rate and regular rhythm. Pulmonary:      Effort: Pulmonary effort is normal.      Breath sounds: Normal breath sounds. Abdominal:      Palpations: Abdomen is soft. Tenderness: There is no abdominal tenderness. Musculoskeletal:         General: No tenderness or deformity. Cervical back: Neck supple.       Comments: No midline cervical, thoracic or lumbar spine tenderness, there is a abrasion over the left anterior knee, no surrounding swelling or tenderness, no bony tenderness over the extremities, full range of motion of all joints, no pain with axial loading or logrolling of hips   Skin:     General: Skin is warm and dry. Neurological:      General: No focal deficit present. Mental Status: She is alert and oriented to person, place, and time. Comments: 5/5 strength with bicep flexion and extension bilaterally, 5/5 strength with ankle flexion and extension bilaterally. Sensation to light touch intact over upper and lower extremities bilaterally. Psychiatric:         Mood and Affect: Mood normal.         Diagnostic Study Results     Labs -   No results found for this or any previous visit (from the past 24 hour(s)). Radiologic Studies -   CT HEAD WO CONT    (Results Pending)     CT Results  (Last 48 hours)    None        CXR Results  (Last 48 hours)    None            Medical Decision Making   I am the first provider for this patient. I reviewed the vital signs, available nursing notes, past medical history, past surgical history, family history and social history. Vital Signs-Reviewed the patient's vital signs. Patient Vitals for the past 24 hrs:   Temp Resp BP SpO2   11/23/21 1508 98.5 °F (36.9 °C) 20 (!) 147/72 100 %         Provider Notes (Medical Decision Making):   82-year-old female presenting with fall. Did hit her head, given age head CT will be obtained. Her neurologic exam is at baseline. No other significant injuries evident on exam.  Does have several months of increasing instability on her feet, will assess for any electrolyte or metabolic abnormalities, hyperammonemia, UTI. ED Course:     Initial assessment performed. The patients presenting problems have been discussed, and they are in agreement with the care plan formulated and outlined with them.   I have encouraged them to ask questions as they arise throughout their visit. ED Course as of 11/24/21 1104   Tue Nov 23, 2021   1840 EKG is performed at 18: 32, shows sinus rhythm at a rate of 79, , QRS 88, QTc prolonged at 552, axis upright, no ST segment elevation or depression concerning for ACS, T wave inversions in lead V1 through V4, no prior EKGs. This is interpreted as sinus rhythm with T wave inversions and prolonged QT. [CM]      ED Course User Index  [CM] Artur-Xavier Mckeon MD      CBC negative for leukocytosis, UA does show blood, however not suggestive of UTI. Findings relayed to patient and her sister, will need to follow-up with primary care doctor to assess for resolution of this. Basic metabolic panel with hypokalemia 2.8, does have a history of hypokalemia per chart review but this is progressing, this is replaced orally. Normal renal function. Bilirubin trending upward from prior, and trending upward also of AST, ammonia fairly stable from her last.  Findings relayed to patient and sister, and stressed importance of need to follow-up with liver specialist as well as her primary care doctor. Patient is counseled on supportive care and return precautions. Will return to the ED for any worsening weakness, falls, or any new or worrisome symptoms. Will followup with primary care doctor, liver specialist within 7 days. Critical Care Time:         Disposition:  Home    PLAN:  1. Current Discharge Medication List        2.    Follow-up Information    None       Return to ED if worse     Diagnosis     Clinical Impression: Acute fall, acute closed head injury

## 2021-11-23 NOTE — HOME HEALTH
LCSW met with patient in her home. Patient is a 73year old female with unspecified cirrhosis of the liver. LCSW assessed for needs such as caregiving and meals. Patient is open to having an aide assist her in the home with cleaning and possibly with bathing. Patient is open to applying for Medicaid and after assessing her finances, she may qualify. Patient did not feel up to calling today. LCSW left the number for Medicaid and discussed the application process so that patient is prepared when she is ready to call. LCSW also left the number for [employer] in case assistance is needed setting up an aide once patient contacts Medicaid or if other resources are needed in the future. LCSW left contact information as well and offered to come back to assist with Medicaid application if needed.      Jennifer Booker LCSW

## 2021-11-24 NOTE — CASE COMMUNICATION
Patient identified as High Risk prior to fall?  yes    MD Notified Dr. Domenico Chan nurse 1:45 pm    Patients [facility] date: 10/18/21    Date of fall (separate report for each fall occurrence)  appox 1:00pm  Fall observed?   no    Describe Event and Document any re-training or treatment plan modification indicated (please include location of fall and may copy and paste from visit note)    Response to re-training or treatment plan modification: T kavita arrived and pt was laying on the steps leading to her front door. Pt has 5 brick steps to exit home. Pt reports falling down the steps going to get the mial. Pt's L knee was bleeding from scrapes through her jeans; and hands were scratched. Reached out to neighbor who is cg who lives in unit beside pt. Pt's reports being on the ground for 30mins. Pt was confused and very anxious from the fall. Pt had no complaints of joint pain wh en assessed. Assisted pt off ground and got pt back into home due to temperature outside and called MD office. Spoke with Dr. Domenico Chan nurse who requested pt go to ER. Dr. Didier Ellis was out of office today and would be in tomorrow nurse reported. Called EMS for transport. Called pt's sister Geno Monique) to report and she plans to meet pt at [phone] used with patient by homecare staff prior to fall:  pt ha d cane and not her walker. Was this equipment in use at time of fall? cane        Injury (yes/no)-yes-see ER report for details.

## 2021-11-24 NOTE — DISCHARGE INSTRUCTIONS
Patient Education        Concussion in Children: Care Instructions  Your Care Instructions     A concussion is a kind of injury to the brain. It happens when the head or body receives a hard blow. The impact can jar or shake the brain against the skull. This interrupts the brain's normal activities. Although your child may have cuts or bruises on the head or face, he or she may have no other visible signs of a brain injury. Your child may not have a CT or MRI scan. Damage to the brain from a concussion can't be seen in these tests. Also, CT and MRI scans have risks. Any child who has had a concussion at a sports event needs to stop all activity and not return to play. Being active again before the brain recovers can raise your child's risk of having a more serious brain injury. For a few weeks, your child may have low energy, dizziness, trouble sleeping, a headache, ringing in the ears, or nausea. Your child may also feel anxious, grumpy, or depressed. He or she may have problems with memory and concentration. These symptoms are common after a concussion. They should slowly improve over time. Sometimes this takes weeks or even months. Follow-up care is a key part of your child's treatment and safety. Be sure to make and go to all appointments, and call your doctor if your child is having problems. It's also a good idea to know your child's test results and keep a list of the medicines your child takes. How can you care for your child at home? Pain control  · Use ice or a cold pack for 10 to 20 minutes at a time on the part of your child's head that hurts. Put a thin cloth between the ice and your child's skin. · Be safe with medicines. Read and follow all instructions on the label. ? If the doctor gave your child a prescription medicine for pain, give it as prescribed. ? Talk to your doctor before you give your child prescription or over-the-counter medicines like Tylenol.  Pain medicines can make headaches more likely. At home  · Help your child rest his or her body and brain. Most experts agree that children should rest for 1 to 2 days. Let your child know that rest--even though it can be hard--can speed up recovery. ? Pay close attention to symptoms as your child slowly returns to his or her regular routine. Avoid anything that makes symptoms worse or causes new ones. ? Make sure your child gets plenty of sleep. It may help to keep your child's room quiet, dark or dimly lit, and cool. Have your child go to bed and get up at the same time, and limit foods and drinks with caffeine. ? Limit housework, homework, and screen time. ? Avoid activities that could lead to another head injury. ? Follow your doctor's instructions for a gradual return to activity and sports. Back to school  · Wait until your child can focus for 30 to 45 minutes at a time before you send your child back to school. · Tell teachers, administrators, school counselors, and nurses what symptoms your child has or could develop. Sign a release form so the school can coordinate care with your child's doctor. · Arrange for any special changes your child needs. For example, depending on symptoms, your child may need to:  ? Start back to school with shorter days. ? Take 15-minute breaks after every 30 minutes of classwork.  ? Have more time for assignments, postpone tests, or have another student take notes. ? Avoid bright lights. (You can suggest dimmed lighting or that your child wear sunglasses.)  ? Avoid noisy places, like the gym or cafeteria. · Check in with school staff often. Discuss how your child is doing, academically and emotionally. A concussion can make kids grouchy and emotional. And needing extra help or extra rest can be hard for some kids. · If your child doesn't recover within 3 to 4 weeks, talk with your doctor and the school staff. They may recommend a 504 plan.  It's a plan for kids who need ongoing adjustments at school. How should your child return to play? Doctors and concussion specialists suggest steps to follow for returning to sports after a concussion. Use these steps as a guide. In most places, your doctor must give you written permission for your child to begin the steps and return to sports. This means that your child must have no symptoms, is back to school, and is no longer taking medicines for the concussion. Your child should slowly progress through the following levels of activity:  1. Limited activity. Your child can take part in daily activities as long as the activity doesn't increase his or her symptoms or cause new symptoms. 2. Light aerobic activity. This can include walking, swimming, or other exercise at less than 70% of your child's maximum heart rate. No resistance training is included in this step. 3. Sport-specific exercise. This includes running drills or skating drills (depending on the sport), but no head impact. 4. Noncontact training drills. This includes more complex training drills such as passing. Your child may also begin light resistance training. 5. Full-contact practice. Your child can participate in normal training. 6. Return to normal game play. This is the final step and allows your child to join in normal game play. Watch and keep track of your child's progress. It should take at least 6 days for your child to go from light activity to normal game play. Make sure that your child can stay at each new level of activity for at least 24 hours without symptoms, or as long as your doctor says, before doing more. If one or more symptoms come back, have your child return to a lower level of activity for at least 24 hours. He or she should not move on until all symptoms are gone. When should you call for help? Call 911 anytime you think your child may need emergency care. For example, call if:    · Your child has a seizure.     · Your child passes out (loses consciousness).      · Your child is confused or hard to wake up. Call your doctor now or seek immediate medical care if:    · Your child has new or worse vomiting.     · Your child seems less alert.     · Your child has new weakness or numbness in any part of the body. Watch closely for changes in your child's health, and be sure to contact your doctor if:    · Your child does not get better as expected.     · Your child has new symptoms, such as headaches, trouble concentrating, or changes in mood. Where can you learn more? Go to http://www.gray.com/  Enter R145 in the search box to learn more about \"Concussion in Children: Care Instructions. \"  Current as of: April 8, 2021               Content Version: 13.0  © 2006-2021 [employer]. Care instructions adapted under license by [employer] (which disclaims liability or warranty for this information). If you have questions about a medical condition or this instruction, always ask your healthcare professional. [place] [address] any warranty or liability for your use of this information. Patient Education        Diabetes and Preventing Falls: Care Instructions  Overview     Complications of diabetes--such as nerve damage, foot problems, and reduced vision--may increase your risk of a fall. Some of your medicines also may add to your risk. By making your home safer, you can lower your risk of falling. Doing things to prevent diabetes complications may also help to lower your risk. You can make your home safer with a few simple measures. Follow-up care is a key part of your treatment and safety. Be sure to make and go to all appointments, and call your doctor if you are having problems. It's also a good idea to know your test results and keep a list of the medicines you take. How can you care for yourself at home?   Taking care of yourself  · Keep your blood sugar at a target level (which you set with your doctor). · Exercise regularly to improve your strength, muscle tone, and balance. Walk if you can. Swimming may be a good choice if you cannot walk easily. · Have your vision checked as often as your doctor recommends. It is usually once a year or more often if you have eye problems. · Know the side effects of the medicines you take. Ask your doctor or pharmacist whether the medicines you take can affect your balance. Sleeping pills or sedatives can affect your balance. · Limit the amount of alcohol you drink. Alcohol can impair your balance and other senses. · Have your doctor check your feet during each visit. If you have a foot problem, see your doctor. Preventing falls at home  · Remove raised doorway thresholds, throw rugs, and clutter. Repair loose carpet or raised areas in the floor. · Move furniture and electrical cords to keep them out of walking paths. · Use nonskid floor wax, and wipe up spills right away, especially on ceramic tile floors. · If you use a walker or cane, put rubber tips on it. If you use crutches, clean the bottoms of them regularly with an abrasive pad, such as steel wool. · Keep your house well lit, especially [facility], and outside walkways. Use night-lights in areas such as hallways and bathrooms. Add extra light switches or use remote switches (such as switches that go on or off when you clap your hands) to make it easier to turn lights on if you have to get up during the night. · Install sturdy handrails on stairways. Put grab bars near your shower, bathtub, and toilet. · Store household items on low shelves so that you do not have to climb or reach high. Or use a reaching device that you can get at a medical supply store. If you have to climb for something, use a step stool with handrails, or ask someone to get it for you. · Keep a cordless phone and a flashlight with new batteries by your bed.  If possible, put a phone in each of the main rooms of your house, or carry a cell phone in case you fall and cannot reach a phone. Or you can wear a device around your neck or wrist. You push a button that sends a signal for help. · Wear low-heeled shoes that fit well and give your feet good support. Use footwear with nonskid soles. Check the heels and soles of your shoes for wear. Repair or replace worn heels or soles. · Do not wear socks without shoes on wood floors. · Walk on the grass when the sidewalks are slippery. If you live in an area that gets snow and ice in the winter, sprinkle salt on slippery steps and sidewalks. Where can you learn more? Go to http://www.gray.com/  Enter X601 in the search box to learn more about \"Diabetes and Preventing Falls: Care Instructions. \"  Current as of: December 7, 2020               Content Version: 13.0  © 8185-9290 [employer]. Care instructions adapted under license by [employer] (which disclaims liability or warranty for this information). If you have questions about a medical condition or this instruction, always ask your healthcare professional. [place] [address] any warranty or liability for your use of this information. Patient Education        Preventing Falls: Care Instructions  Your Care Instructions    Getting around your home safely can be a challenge if you have injuries or health problems that make it easy for you to fall. Loose rugs and furniture in walkways are among the dangers for many older people who have problems walking or who have poor eyesight. People who have conditions such as arthritis, osteoporosis, or dementia also have to be careful not to fall. You can make your home safer with a few simple measures. Follow-up care is a key part of your treatment and safety. Be sure to make and go to all appointments, and call your doctor if you are having problems.  It's also a good idea to know your test results and keep a list of the medicines you take. How can you care for yourself at home? Taking care of yourself  · You may get dizzy if you do not drink enough water. To prevent dehydration, drink plenty of fluids, enough so that your urine is light yellow or clear like water. Choose water and other caffeine-free clear liquids. If you have kidney, heart, or liver disease and have to limit fluids, talk with your doctor before you increase the amount of fluids you drink. · Exercise regularly to improve your strength, muscle tone, and balance. Walk if you can. Swimming may be a good choice if you cannot walk easily. · Have your vision and hearing checked each year or any time you notice a change. If you have trouble seeing and hearing, you might not be able to avoid objects and could lose your balance. · Know the side effects of the medicines you take. Ask your doctor or pharmacist whether the medicines you take can affect your balance. Sleeping pills or sedatives can affect your balance. · Limit the amount of alcohol you drink. Alcohol can impair your balance and other senses. · Ask your doctor whether calluses or corns on your feet need to be removed. If you wear loose-fitting shoes because of calluses or corns, you can lose your balance and fall. · Talk to your doctor if you have numbness in your feet. Preventing falls at home  · Remove raised doorway thresholds, throw rugs, and clutter. Repair loose carpet or raised areas in the floor. · Move furniture and electrical cords to keep them out of walking paths. · Use nonskid floor wax, and wipe up spills right away, especially on ceramic tile floors. · If you use a walker or cane, put rubber tips on it. If you use crutches, clean the bottoms of them regularly with an abrasive pad, such as steel wool. · Keep your house well lit, especially [facility], and outside walkways. Use night-lights in areas such as hallways and bathrooms.  Add extra light switches or use remote switches (such as switches that go on or off when you clap your hands) to make it easier to turn lights on if you have to get up during the night. · Install sturdy handrails on stairways. · Move items in your cabinets so that the things you use a lot are on the lower shelves (about waist level). · Keep a cordless phone and a flashlight with new batteries by your bed. If possible, put a phone in each of the main rooms of your house, or carry a cell phone in case you fall and cannot reach a phone. Or, you can wear a device around your neck or wrist. You push a button that sends a signal for help. · Wear low-heeled shoes that fit well and give your feet good support. Use footwear with nonskid soles. Check the heels and soles of your shoes for wear. Repair or replace worn heels or soles. · Do not wear socks without shoes on wood floors. · Walk on the grass when the sidewalks are slippery. If you live in an area that gets snow and ice in the winter, sprinkle salt on slippery steps and sidewalks. Preventing falls in the bath  · Install grab bars and nonskid mats inside and outside your shower or tub and near the toilet and sinks. · Use shower chairs and bath benches. · Use a hand-held shower head that will allow you to sit while showering. · Get into a tub or shower by putting the weaker leg in first. Get out of a tub or shower with your strong side first.  · Repair loose toilet seats and consider installing a raised toilet seat to make getting on and off the toilet easier. · Keep your bathroom door unlocked while you are in the shower. Where can you learn more? Go to http://www.[employer].com/. Enter 0476 [id number] in the search box to learn more about \"Preventing Falls: Care Instructions. \"  Current as of: March 16, 2018  Content Version: 11.8  © 5335-2320 [employer]. Care instructions adapted under license by [employer] (which disclaims liability or warranty for this information). If you have questions about a medical condition or this instruction, always ask your healthcare professional. [place] [address] any warranty or liability for your use of this information.

## 2021-12-01 PROBLEM — R53.1 WEAKNESS GENERALIZED: Status: ACTIVE | Noted: 2021-01-01

## 2021-12-01 PROBLEM — R53.1 GENERALIZED WEAKNESS: Status: ACTIVE | Noted: 2021-01-01

## 2021-12-01 PROBLEM — R62.7 ADULT FAILURE TO THRIVE: Status: ACTIVE | Noted: 2021-01-01

## 2021-12-01 PROBLEM — R29.6 RECURRENT FALLS: Status: ACTIVE | Noted: 2021-01-01

## 2021-12-02 NOTE — H&P
Hospitalist Admission Note    NAME: Brianna Otoole   :  1950   MRN:  [id number]     Date/Time:  2021 11:35 PM    Patient PCP: Oleg Sidhu MD  ______________________________________________________________________  Given the patient's current clinical presentation, I have a high level of concern for decompensation if discharged from the emergency department. Complex decision making was performed, which includes reviewing the patient's available past medical records, laboratory results, and x-ray films. Assessment / Plan:      Generalized weakness (2021)    Recurrent falls (2021)    Adult failure to thrive (2021)    Decreased urine output  · Admit to telemetry  · Orthostatic blood pressure  · Echocardiogram  · UA with reflex culture  · IV fluid as patient appears to be clinically dehydrated  · Post void bladder scans  · Fall precaution  · CBC, CMP, magnesium, lactic acid level, NT proBNP           HTN (hypertension)   · Continue home antihypertensive medications  · Monitor        Acquired hypothyroidism   · Check TSH and free T4        Hyperlipidemia   · Continue Lipitor  · Check lipid panel         Code Status: Full code  Surrogate Decision Maker: Laxmi Wright (Sister) [phone]    DVT Prophylaxis: Not indicated secondary to high fall risk and frequent falls  GI Prophylaxis: Pepcid    Activity at baseline: walker    Lives with: lives alone          Subjective:     CHIEF COMPLAINT: weakness, frequent falls    HISTORY OF PRESENT ILLNESS:     Brianna Otoole is a 73 y.o. WHITE/NON- female with medical history including but not limited to cirrhosis of liver, hypertension, hypothyroidism, hyperlipidemia presented today to the emergency room with generalized weakness and fatigue, failure to thrive and recurrent falls.   Reportedly she had notably become more and more unstable on her feet and has fallen multiple times resulting in significant bruising to her back with some abrasion to lower extremities and bruising. She states that she feels very weak when she stands up or tries to ambulate. She denies hitting head with any of her falls. Patient and family have verbalized concern as patient is not safe at home anymore and she lives alone. Head CT without contrast done in the emergency department reports no acute intracranial abnormality is confirmed. Microvascular ischemic and age-related change mild and stable. Cervical spine CT was done as well which reported no acute abnormality with degenerative changes at C4-5-C6-7    Chest and abdomen pelvis CT without contrast reported no acute fracture or other posttraumatic abnormality in the chest, abdomen, or pelvis. Mild right hemicolon wall thickening may represent nonspecific infection or inflammation. Stable cirrhotic liver morphology with a small amount of ascites. Cholelithiasis. We were asked to admit for work up and evaluation of the above problems. Past Medical History:   Diagnosis Date    Breast cancer ([facility] [address]     HTN (hypertension)     Hyperlipidemia         Past Surgical History:   Procedure Laterality Date    HX BILATERAL MASTECTOMY         Social History     Tobacco Use    Smoking status: Former Smoker     Packs/day: 1.00     Years: 42.00     Pack years: 42.00     Types: Cigarettes     Quit date: 2015     Years since quittin.5    Smokeless tobacco: Never Used   Substance Use Topics    Alcohol use: Never        Family History   Problem Relation Age of Onset    Emphysema Mother     Heart Attack Father     Breast Cancer Sister      No Known Allergies     Prior to Admission medications    Medication Sig Start Date End Date Taking? Authorizing Provider   aspirin delayed-release 81 mg tablet Take 81 mg by mouth daily. Provider, Historical   budesonide (PULMICORT) 180 mcg/actuation aepb inhaler Take 2 Puffs by inhalation daily.     Provider, Historical   albuterol (PROVENTIL VENTOLIN) 2.5 mg /3 mL (0.083 %) nebu Take 2.5 mg by inhalation every four (4) hours as needed for Wheezing. Provider, Historical   atorvastatin (LIPITOR) 80 mg tablet Take 80 mg by mouth daily. 9/16/21   Provider, Historical   levothyroxine (SYNTHROID) 137 mcg tablet Take 112 mcg by mouth Daily (before breakfast). 9/11/21   Provider, Historical   lisinopril-hydroCHLOROthiazide (PRINZIDE, ZESTORETIC) 20-25 mg per tablet Take 1 Tablet by mouth daily. 9/16/21   Provider, Historical   metoprolol tartrate (LOPRESSOR) 25 mg tablet Take 25 mg by mouth two (2) times a day. 9/16/21   Provider, Historical   sertraline (ZOLOFT) 100 mg tablet TAKE 1 AND 1/2 TABLETS BY MOUTH DAILY 9/16/21   Provider, Historical   spironolactone (ALDACTONE) 25 mg tablet Take 25 mg by mouth daily as needed for PRN Reason (Other) (for swelling in ankles or abdomen). 8/11/21   Provider, Historical       REVIEW OF SYSTEMS:         Review of Systems   Constitutional: Positive for activity change. Negative for appetite change. HENT: Negative for congestion. Respiratory: Negative for chest tightness. Gastrointestinal: Negative for abdominal pain, blood in stool and nausea. Genitourinary: Positive for decreased urine volume and difficulty urinating. Musculoskeletal: Positive for arthralgias, back pain and gait problem. Neurological: Positive for weakness. Negative for dizziness and light-headedness. Hematological: Bruises/bleeds easily. Objective:   VITALS:    Visit Vitals  /71   Pulse 74   Temp 97.6 °F (36.4 °C)   Resp 14   Ht 5' 4\" (1.626 m)   Wt 77.1 kg (169 lb 15.6 oz)   SpO2 100%   BMI 29.18 kg/m²           Physical Exam  HENT:      Head: Normocephalic. Nose: Nose normal.      Mouth/Throat:      Mouth: Mucous membranes are dry. Cardiovascular:      Rate and Rhythm: Normal rate and regular rhythm. Pulmonary:      Effort: Pulmonary effort is normal.      Breath sounds: Normal breath sounds.    Abdominal: General: Bowel sounds are normal.      Palpations: Abdomen is soft. Skin:     Findings: Bruising (Multiple large bruises throughout back. Large bruise to lower back. Also there are abrasions and bruising noted to bilateral lower extremities) present. Neurological:      Mental Status: She is alert and oriented to person, place, and time. Procedures: see electronic medical records for all procedures/Xrays and details which were not copied into this note but were reviewed prior to creation of Plan. Recent Imaging studies(If Any)    CXR Results  (Last 48 hours)               12/01/21 1909  XR CHEST PORT Final result    Impression:  No acute cardiopulmonary disease radiographically. .  . Narrative:  INDICATION:  falls, FTT        EXAM: Chest single view. COMPARISON: None. Red Confer FINDINGS: A single frontal view of the chest at 1905 hours shows clear lungs. The heart, mediastinum and pulmonary vasculature are normal .  The bony thorax   is unremarkable for age. .                  Echo Results  (Last 48 hours)    None           CT Results  (Last 48 hours)               12/01/21 2145  CT SPINE CERV WO CONT Final result    Impression:  No acute abnormality. Degenerative changes at C4-5 through C6-7. Narrative:  EXAM:  CT CERVICAL SPINE WITHOUT CONTRAST       INDICATION: Recurrent falls with bruising. COMPARISON: None. CONTRAST:  None. TECHNIQUE: Multislice helical CT of the cervical spine was performed without   intravenous contrast administration. Sagittal and coronal reformats were   generated. CT dose reduction was achieved through use of a standardized   protocol tailored for this examination and automatic exposure control for dose   modulation. FINDINGS:   There is no acute fracture or subluxation. Vertebral body heights are   maintained.  There is intervertebral disc space narrowing with posterior disc   osteophyte complexes at C4-5 through C6-7. There is mild spinal canal stenosis   and bilateral neural foraminal stenosis at these levels. There is no abnormality   in alignment. The paraspinal soft tissues are unremarkable. The visualized lung   apices are clear. 12/01/21 2145  CT CHEST W CONT Final result    Impression:  1. No acute fracture or other posttraumatic abnormality in the chest, abdomen,   or pelvis. 2. Mild right hemicolon wall thickening may represent nonspecific infection or   inflammation. 3. Stable cirrhotic liver morphology with a small amount of ascites. 4. Cholelithiasis. Narrative:  EXAM:  CT CHEST W CONT, CT ABD PELV W CONT       INDICATION: Recurrent falls. Bruising. Generalized weakness. COMPARISON: Chest radiograph 12/1/2021. CT abdomen 7/26/2021. TECHNIQUE: Helical CT of the chest, abdomen  and pelvis with intravenous   contrast.  Coronal and sagittal reformats are performed. CT dose reduction was   achieved through use of a standardized protocol tailored for this examination   and automatic exposure control for dose modulation. FINDINGS:    CT chest:     The visualized thyroid gland is unremarkable. The aorta and main pulmonary   artery are normal in caliber. The heart size is normal.  There is no pericardial   or pleural effusion. There are no enlarged axillary, mediastinal, or hilar lymph nodes. There are   small calcified hilar and mediastinal lymph nodes. There is no lung mass or airspace opacity. There is no pneumothorax. The   central airways are clear. CT abdomen and pelvis: There is a stable cirrhotic liver morphology without focal liver mass. The   spleen, pancreas, and adrenal glands are normal. There are small gallstones   without intra- or extra-hepatic biliary dilatation. The kidneys are symmetric without hydronephrosis. There are stable right kidney   cysts. There are no dilated bowel loops.  There is mild right hemicolon wall thickening   with minimal adjacent stranding. The appendix is normal.         There are no enlarged lymph nodes. There is a small amount of free fluid. There   is no free air. The aorta tapers without aneurysm. The urinary bladder is normal.  There is no pelvic mass. There is no acute fracture or aggressive bony lesion. 12/01/21 2145  CT ABD PELV W CONT Final result    Impression:  1. No acute fracture or other posttraumatic abnormality in the chest, abdomen,   or pelvis. 2. Mild right hemicolon wall thickening may represent nonspecific infection or   inflammation. 3. Stable cirrhotic liver morphology with a small amount of ascites. 4. Cholelithiasis. Narrative:  EXAM:  CT CHEST W CONT, CT ABD PELV W CONT       INDICATION: Recurrent falls. Bruising. Generalized weakness. COMPARISON: Chest radiograph 12/1/2021. CT abdomen 7/26/2021. TECHNIQUE: Helical CT of the chest, abdomen  and pelvis with intravenous   contrast.  Coronal and sagittal reformats are performed. CT dose reduction was   achieved through use of a standardized protocol tailored for this examination   and automatic exposure control for dose modulation. FINDINGS:    CT chest:     The visualized thyroid gland is unremarkable. The aorta and main pulmonary   artery are normal in caliber. The heart size is normal.  There is no pericardial   or pleural effusion. There are no enlarged axillary, mediastinal, or hilar lymph nodes. There are   small calcified hilar and mediastinal lymph nodes. There is no lung mass or airspace opacity. There is no pneumothorax. The   central airways are clear. CT abdomen and pelvis: There is a stable cirrhotic liver morphology without focal liver mass. The   spleen, pancreas, and adrenal glands are normal. There are small gallstones   without intra- or extra-hepatic biliary dilatation.          The kidneys are symmetric without hydronephrosis. There are stable right kidney   cysts. There are no dilated bowel loops. There is mild right hemicolon wall thickening   with minimal adjacent stranding. The appendix is normal.         There are no enlarged lymph nodes. There is a small amount of free fluid. There   is no free air. The aorta tapers without aneurysm. The urinary bladder is normal.  There is no pelvic mass. There is no acute fracture or aggressive bony lesion. 12/01/21 1840  CT HEAD WO CONT Final result    Impression:      1. No acute intracranial abnormality is confirmed. 2. Microvascular ischemic and age-related change mild and stable. Narrative:  EXAM: CT HEAD WO CONT       INDICATION: falls, weakness       COMPARISON: 11/23/2021. CONTRAST: None. TECHNIQUE: Unenhanced CT of the head was performed using 5 mm images. Brain and   bone windows were generated. Coronal and sagittal reformats. CT dose reduction   was achieved through use of a standardized protocol tailored for this   examination and automatic exposure control for dose modulation. FINDINGS:   The ventricles and sulci are normal in size, shape and configuration. . Mild   periventricular white matter low-density is stable. . There is no intracranial   hemorrhage, extra-axial collection, or mass effect. The basilar cisterns are   open. No CT evidence of acute infarct. The bone windows demonstrate no acute abnormalities. Stable mucoperiosteal   thickening significant in the right maxillary sinus with a large retention cyst   in the left maxillary sinus. .                  EKG RESULTS     Procedure Component Value Units Date/Time    EKG 12 LEAD INITIAL [[id number]] Collected: 12/01/21 1930    Order Status: Completed Updated: 12/01/21 2229     Ventricular Rate 75 BPM      Atrial Rate 75 BPM      P-R Interval 140 ms      QRS Duration 84 ms      Q-T Interval 472 ms      QTC Calculation (Bezet) 527 ms Calculated P Axis 28 degrees      Calculated R Axis 12 degrees      Calculated T Axis 29 degrees      Diagnosis --     Normal sinus rhythm  Anterior infarct (cited on or before 01-DEC-2021)  Prolonged QT  When compared with ECG of 23-NOV-2021 18:32,  No significant change was found      EKG, 12 LEAD, INITIAL [[id number]] Collected: 11/23/21 1832    Order Status: Completed Updated: 11/24/21 1600     Ventricular Rate 79 BPM      Atrial Rate 79 BPM      P-R Interval 152 ms      QRS Duration 88 ms      Q-T Interval 482 ms      QTC Calculation (Bezet) 552 ms      Calculated P Axis 57 degrees      Calculated R Axis 13 degrees      Calculated T Axis 47 degrees      Diagnosis --     Normal sinus rhythm  Prolonged QT  No previous ECGs available  Confirmed by Jonn Hallman ([id number]) on 11/24/2021 3:41:56 PM                 Recent Microbiology Data(If Any)  . All Micro Results     None             LAB DATA REVIEWED:    Recent Results (from the past 24 hour(s))   CBC WITH AUTOMATED DIFF    Collection Time: 12/01/21  7:03 PM   Result Value Ref Range    WBC 3.8 3.6 - 11.0 K/uL    RBC 3.63 (L) 3.80 - 5.20 M/uL    HGB 10.5 (L) 11.5 - 16.0 g/dL    HCT 31.9 (L) 35.0 - 47.0 %    MCV 87.9 80.0 - 99.0 FL    MCH 28.9 26.0 - 34.0 PG    MCHC 32.9 30.0 - 36.5 g/dL    RDW 18.5 (H) 11.5 - 14.5 %    PLATELET 90 (L) 087 - 400 K/uL    MPV 10.7 8.9 - 12.9 FL    NRBC 0.0 0  WBC    ABSOLUTE NRBC 0.00 0.00 - 0.01 K/uL    NEUTROPHILS 70 32 - 75 %    LYMPHOCYTES 16 12 - 49 %    MONOCYTES 9 5 - 13 %    EOSINOPHILS 4 0 - 7 %    BASOPHILS 1 0 - 1 %    IMMATURE GRANULOCYTES 0 0.0 - 0.5 %    ABS. NEUTROPHILS 2.7 1.8 - 8.0 K/UL    ABS. LYMPHOCYTES 0.6 (L) 0.8 - 3.5 K/UL    ABS. MONOCYTES 0.3 0.0 - 1.0 K/UL    ABS. EOSINOPHILS 0.2 0.0 - 0.4 K/UL    ABS. BASOPHILS 0.0 0.0 - 0.1 K/UL    ABS. IMM.  GRANS. 0.0 0.00 - 0.04 K/UL    DF AUTOMATED     METABOLIC PANEL, COMPREHENSIVE    Collection Time: 12/01/21  7:03 PM   Result Value Ref Range    Sodium 137 136 - 145 mmol/L    Potassium 3.2 (L) 3.5 - 5.1 mmol/L    Chloride 102 97 - 108 mmol/L    CO2 26 21 - 32 mmol/L    Anion gap 9 5 - 15 mmol/L    Glucose 79 65 - 100 mg/dL    BUN 17 6 - 20 MG/DL    Creatinine 0.76 0.55 - 1.02 MG/DL    BUN/Creatinine ratio 22 (H) 12 - 20      GFR est AA >60 >60 ml/min/1.73m2    GFR est non-AA >60 >60 ml/min/1.73m2    Calcium 8.9 8.5 - 10.1 MG/DL    Bilirubin, total 2.0 (H) 0.2 - 1.0 MG/DL    ALT (SGPT) 115 (H) 12 - 78 U/L    AST (SGOT) 186 (H) 15 - 37 U/L    Alk. phosphatase 112 45 - 117 U/L    Protein, total 7.8 6.4 - 8.2 g/dL    Albumin 3.0 (L) 3.5 - 5.0 g/dL    Globulin 4.8 (H) 2.0 - 4.0 g/dL    A-G Ratio 0.6 (L) 1.1 - 2.2     SAMPLES BEING HELD    Collection Time: 12/01/21  7:03 PM   Result Value Ref Range    SAMPLES BEING HELD PST     COMMENT        Add-on orders for these samples will be processed based on acceptable specimen integrity and analyte stability, which may vary by analyte.    EKG, 12 LEAD, INITIAL    Collection Time: 12/01/21  7:30 PM   Result Value Ref Range    Ventricular Rate 75 BPM    Atrial Rate 75 BPM    P-R Interval 140 ms    QRS Duration 84 ms    Q-T Interval 472 ms    QTC Calculation (Bezet) 527 ms    Calculated P Axis 28 degrees    Calculated R Axis 12 degrees    Calculated T Axis 29 degrees    Diagnosis       Normal sinus rhythm  Anterior infarct (cited on or before 01-DEC-2021)  Prolonged QT  When compared with ECG of 23-NOV-2021 18:32,  No significant change was found     AMMONIA    Collection Time: 12/01/21  8:19 PM   Result Value Ref Range    Ammonia 34 (H) <32 UMOL/L                  _______________________________________________________________________  Care Plan discussed with:    Comments   Patient x    Family      RN     Care Manager                    Consultant:      _______________________________________________________________________  Expected  Disposition:   Home with Family    HH/PT/OT/RN    SNF/LTC [facility] ________________________________________________________________________  TOTAL TIME:  39 Minutes    Critical Care Provided     Minutes non procedure based      Comments    x Reviewed previous records   >50% of visit spent in counseling and coordination of care x Discussion with patient and/or family and questions answered           Patient hemodynamically stable at time of admission    ________________________________________________________________________  Signed: Rose Paulino DNP, ACNP-BC    Please note that this note was dictated using Dragon computer voice recognition software. Quite often unanticipated grammatical, syntax, homophones, and other interpretive errors are inadvertently transcribed by the computer software. Please disregard these errors. Please excuse any errors that have escaped final proofreading.

## 2021-12-02 NOTE — ED PROVIDER NOTES
EMERGENCY DEPARTMENT HISTORY AND PHYSICAL EXAM      Date: 12/1/2021  Patient Name: Nile Coronado    History of Presenting Illness     Chief Complaint   Patient presents with    Back Pain     sent to ED as home nurse worried re: pt multiple falls and multiple bruises. pt has large bruise per rescue on her back. pt alert pt can walk but with assistance.  Fall       History Provided By: Patient    HPI: Nile Coronado, 73 y.o. female with history of hypertension, hyperlipidemia, liver cirrhosis presents to the ED with cc of recurrent falls, failure to thrive, generalized weakness and fatigue. Patient has been progressively worsening over the past few weeks. She has had gait instability and has suffered multiple falls. Patient's neighbors have noted that patient has been having multiple falls both in and outside the home in the yard. Patient denies head injuries. She states that she does not know why she keeps falling. She does endorse generalized weakness but denies any pain, chest pain, shortness of breath, cough, fevers, chills, or recent illness. PCP referred patient to emergency department for admission with eventual plan for placement. Patient's family, sister, is at bedside stating that she is not safe for discharge home due to persistent falls and the fact that she lives home alone. Sister also reports that patient's mental status has declined over the past few weeks. There are no other complaints, changes, or physical findings at this time. PCP: Daisy Echols MD    No current facility-administered medications on file prior to encounter. Current Outpatient Medications on File Prior to Encounter   Medication Sig Dispense Refill    aspirin delayed-release 81 mg tablet Take 81 mg by mouth daily.  budesonide (PULMICORT) 180 mcg/actuation aepb inhaler Take 2 Puffs by inhalation daily.       albuterol (PROVENTIL VENTOLIN) 2.5 mg /3 mL (0.083 %) nebu Take 2.5 mg by inhalation every four (4) hours as needed for Wheezing.  atorvastatin (LIPITOR) 80 mg tablet Take 80 mg by mouth daily.  levothyroxine (SYNTHROID) 137 mcg tablet Take 112 mcg by mouth Daily (before breakfast).  lisinopril-hydroCHLOROthiazide (PRINZIDE, ZESTORETIC) 20-25 mg per tablet Take 1 Tablet by mouth daily.  metoprolol tartrate (LOPRESSOR) 25 mg tablet Take 25 mg by mouth two (2) times a day.  sertraline (ZOLOFT) 100 mg tablet TAKE 1 AND 1/2 TABLETS BY MOUTH DAILY      spironolactone (ALDACTONE) 25 mg tablet Take 25 mg by mouth daily as needed for PRN Reason (Other) (for swelling in ankles or abdomen). Past History     Past Medical History:  Past Medical History:   Diagnosis Date    Breast cancer ([facility] [address]     HTN (hypertension)     Hyperlipidemia        Past Surgical History:  Past Surgical History:   Procedure Laterality Date    HX BILATERAL MASTECTOMY         Family History:  No family history on file. Social History:  Social History     Tobacco Use    Smoking status: Former Smoker     Packs/day: 1.00     Years: 42.00     Pack years: 42.00     Types: Cigarettes     Quit date: 2015     Years since quittin.5    Smokeless tobacco: Never Used   Substance Use Topics    Alcohol use: Never    Drug use: Never       Allergies:  No Known Allergies      Review of Systems   Review of Systems   Constitutional: Positive for activity change, appetite change and fatigue. Negative for chills and fever. Eyes: Negative for visual disturbance. Respiratory: Negative for cough and shortness of breath. Cardiovascular: Negative for chest pain and leg swelling. Gastrointestinal: Negative for abdominal pain, nausea and vomiting. Genitourinary: Negative. Musculoskeletal: Positive for gait problem. Negative for back pain. Skin: Positive for color change. Negative for rash. Neurological: Negative for dizziness, weakness, light-headedness and headaches.    Hematological: Does not bruise/bleed easily. All other systems reviewed and are negative. Physical Exam   Physical Exam  Vitals and nursing note reviewed. Constitutional:       General: She is not in acute distress. Appearance: Normal appearance. She is not ill-appearing or toxic-appearing. Comments: Appears elderly and tired laying in bed in no acute distress. HENT:      Head: Normocephalic and atraumatic. Nose: Nose normal.      Mouth/Throat:      Mouth: Mucous membranes are dry. Eyes:      Extraocular Movements: Extraocular movements intact. Pupils: Pupils are equal, round, and reactive to light. Cardiovascular:      Rate and Rhythm: Normal rate and regular rhythm. Heart sounds: No murmur heard. Pulmonary:      Effort: Pulmonary effort is normal. No respiratory distress. Breath sounds: Normal breath sounds. No wheezing. Abdominal:      General: There is no distension. Palpations: Abdomen is soft. Tenderness: There is no abdominal tenderness. There is no guarding or rebound. Musculoskeletal:         General: No swelling or tenderness. Normal range of motion. Cervical back: Normal range of motion and neck supple. Right lower leg: No edema. Left lower leg: No edema. Skin:     General: Skin is warm and dry. Coloration: Skin is not pale. Findings: Bruising present. No erythema. Comments: Multiple areas of various aging ecchymosis throughout back, left buttock, and throughout the bilateral lower extremities   Neurological:      General: No focal deficit present. Mental Status: She is alert and oriented to person, place, and time. Comments: No focal deficits, patient is awake alert oriented x4. Motor 5/5, sensation intact.   No asterixis on exam.         Diagnostic Study Results     Labs -     Recent Results (from the past 12 hour(s))   CBC WITH AUTOMATED DIFF    Collection Time: 12/01/21  7:03 PM   Result Value Ref Range    WBC 3.8 3.6 - 11.0 K/uL    RBC 3.63 (L) 3.80 - 5.20 M/uL    HGB 10.5 (L) 11.5 - 16.0 g/dL    HCT 31.9 (L) 35.0 - 47.0 %    MCV 87.9 80.0 - 99.0 FL    MCH 28.9 26.0 - 34.0 PG    MCHC 32.9 30.0 - 36.5 g/dL    RDW 18.5 (H) 11.5 - 14.5 %    PLATELET 90 (L) 941 - 400 K/uL    MPV 10.7 8.9 - 12.9 FL    NRBC 0.0 0  WBC    ABSOLUTE NRBC 0.00 0.00 - 0.01 K/uL    NEUTROPHILS 70 32 - 75 %    LYMPHOCYTES 16 12 - 49 %    MONOCYTES 9 5 - 13 %    EOSINOPHILS 4 0 - 7 %    BASOPHILS 1 0 - 1 %    IMMATURE GRANULOCYTES 0 0.0 - 0.5 %    ABS. NEUTROPHILS 2.7 1.8 - 8.0 K/UL    ABS. LYMPHOCYTES 0.6 (L) 0.8 - 3.5 K/UL    ABS. MONOCYTES 0.3 0.0 - 1.0 K/UL    ABS. EOSINOPHILS 0.2 0.0 - 0.4 K/UL    ABS. BASOPHILS 0.0 0.0 - 0.1 K/UL    ABS. IMM. GRANS. 0.0 0.00 - 0.04 K/UL    DF AUTOMATED     METABOLIC PANEL, COMPREHENSIVE    Collection Time: 12/01/21  7:03 PM   Result Value Ref Range    Sodium 137 136 - 145 mmol/L    Potassium 3.2 (L) 3.5 - 5.1 mmol/L    Chloride 102 97 - 108 mmol/L    CO2 26 21 - 32 mmol/L    Anion gap 9 5 - 15 mmol/L    Glucose 79 65 - 100 mg/dL    BUN 17 6 - 20 MG/DL    Creatinine 0.76 0.55 - 1.02 MG/DL    BUN/Creatinine ratio 22 (H) 12 - 20      GFR est AA >60 >60 ml/min/1.73m2    GFR est non-AA >60 >60 ml/min/1.73m2    Calcium 8.9 8.5 - 10.1 MG/DL    Bilirubin, total 2.0 (H) 0.2 - 1.0 MG/DL    ALT (SGPT) 115 (H) 12 - 78 U/L    AST (SGOT) 186 (H) 15 - 37 U/L    Alk. phosphatase 112 45 - 117 U/L    Protein, total 7.8 6.4 - 8.2 g/dL    Albumin 3.0 (L) 3.5 - 5.0 g/dL    Globulin 4.8 (H) 2.0 - 4.0 g/dL    A-G Ratio 0.6 (L) 1.1 - 2.2     SAMPLES BEING HELD    Collection Time: 12/01/21  7:03 PM   Result Value Ref Range    SAMPLES BEING HELD PST     COMMENT        Add-on orders for these samples will be processed based on acceptable specimen integrity and analyte stability, which may vary by analyte.    EKG, 12 LEAD, INITIAL    Collection Time: 12/01/21  7:30 PM   Result Value Ref Range    Ventricular Rate 75 BPM    Atrial Rate 75 BPM P-R Interval 140 ms    QRS Duration 84 ms    Q-T Interval 472 ms    QTC Calculation (Bezet) 527 ms    Calculated P Axis 28 degrees    Calculated R Axis 12 degrees    Calculated T Axis 29 degrees    Diagnosis       Normal sinus rhythm  Anterior infarct (cited on or before 01-DEC-2021)  Prolonged QT  When compared with ECG of 23-NOV-2021 18:32,  No significant change was found     AMMONIA    Collection Time: 12/01/21  8:19 PM   Result Value Ref Range    Ammonia 34 (H) <32 UMOL/L       Radiologic Studies -   CT SPINE CERV WO CONT   Final Result   No acute abnormality. Degenerative changes at C4-5 through C6-7. CT CHEST W CONT   Final Result   1. No acute fracture or other posttraumatic abnormality in the chest, abdomen,   or pelvis. 2. Mild right hemicolon wall thickening may represent nonspecific infection or   inflammation. 3. Stable cirrhotic liver morphology with a small amount of ascites. 4. Cholelithiasis. CT ABD PELV W CONT   Final Result   1. No acute fracture or other posttraumatic abnormality in the chest, abdomen,   or pelvis. 2. Mild right hemicolon wall thickening may represent nonspecific infection or   inflammation. 3. Stable cirrhotic liver morphology with a small amount of ascites. 4. Cholelithiasis. XR CHEST PORT   Final Result   No acute cardiopulmonary disease radiographically. .  .         CT HEAD WO CONT   Final Result      1. No acute intracranial abnormality is confirmed. 2. Microvascular ischemic and age-related change mild and stable. CT Results  (Last 48 hours)               12/01/21 2145  CT SPINE CERV WO CONT Final result    Impression:  No acute abnormality. Degenerative changes at C4-5 through C6-7. Narrative:  EXAM:  CT CERVICAL SPINE WITHOUT CONTRAST       INDICATION: Recurrent falls with bruising. COMPARISON: None. CONTRAST:  None.        TECHNIQUE: Multislice helical CT of the cervical spine was performed without   intravenous contrast administration. Sagittal and coronal reformats were   generated. CT dose reduction was achieved through use of a standardized   protocol tailored for this examination and automatic exposure control for dose   modulation. FINDINGS:   There is no acute fracture or subluxation. Vertebral body heights are   maintained. There is intervertebral disc space narrowing with posterior disc   osteophyte complexes at C4-5 through C6-7. There is mild spinal canal stenosis   and bilateral neural foraminal stenosis at these levels. There is no abnormality   in alignment. The paraspinal soft tissues are unremarkable. The visualized lung   apices are clear. 12/01/21 2145  CT CHEST W CONT Final result    Impression:  1. No acute fracture or other posttraumatic abnormality in the chest, abdomen,   or pelvis. 2. Mild right hemicolon wall thickening may represent nonspecific infection or   inflammation. 3. Stable cirrhotic liver morphology with a small amount of ascites. 4. Cholelithiasis. Narrative:  EXAM:  CT CHEST W CONT, CT ABD PELV W CONT       INDICATION: Recurrent falls. Bruising. Generalized weakness. COMPARISON: Chest radiograph 12/1/2021. CT abdomen 7/26/2021. TECHNIQUE: Helical CT of the chest, abdomen  and pelvis with intravenous   contrast.  Coronal and sagittal reformats are performed. CT dose reduction was   achieved through use of a standardized protocol tailored for this examination   and automatic exposure control for dose modulation. FINDINGS:    CT chest:     The visualized thyroid gland is unremarkable. The aorta and main pulmonary   artery are normal in caliber. The heart size is normal.  There is no pericardial   or pleural effusion. There are no enlarged axillary, mediastinal, or hilar lymph nodes. There are   small calcified hilar and mediastinal lymph nodes.        There is no lung mass or airspace opacity. There is no pneumothorax. The   central airways are clear. CT abdomen and pelvis: There is a stable cirrhotic liver morphology without focal liver mass. The   spleen, pancreas, and adrenal glands are normal. There are small gallstones   without intra- or extra-hepatic biliary dilatation. The kidneys are symmetric without hydronephrosis. There are stable right kidney   cysts. There are no dilated bowel loops. There is mild right hemicolon wall thickening   with minimal adjacent stranding. The appendix is normal.         There are no enlarged lymph nodes. There is a small amount of free fluid. There   is no free air. The aorta tapers without aneurysm. The urinary bladder is normal.  There is no pelvic mass. There is no acute fracture or aggressive bony lesion. 12/01/21 2145  CT ABD PELV W CONT Final result    Impression:  1. No acute fracture or other posttraumatic abnormality in the chest, abdomen,   or pelvis. 2. Mild right hemicolon wall thickening may represent nonspecific infection or   inflammation. 3. Stable cirrhotic liver morphology with a small amount of ascites. 4. Cholelithiasis. Narrative:  EXAM:  CT CHEST W CONT, CT ABD PELV W CONT       INDICATION: Recurrent falls. Bruising. Generalized weakness. COMPARISON: Chest radiograph 12/1/2021. CT abdomen 7/26/2021. TECHNIQUE: Helical CT of the chest, abdomen  and pelvis with intravenous   contrast.  Coronal and sagittal reformats are performed. CT dose reduction was   achieved through use of a standardized protocol tailored for this examination   and automatic exposure control for dose modulation. FINDINGS:    CT chest:     The visualized thyroid gland is unremarkable. The aorta and main pulmonary   artery are normal in caliber. The heart size is normal.  There is no pericardial   or pleural effusion.          There are no enlarged axillary, mediastinal, or hilar lymph nodes. There are   small calcified hilar and mediastinal lymph nodes. There is no lung mass or airspace opacity. There is no pneumothorax. The   central airways are clear. CT abdomen and pelvis: There is a stable cirrhotic liver morphology without focal liver mass. The   spleen, pancreas, and adrenal glands are normal. There are small gallstones   without intra- or extra-hepatic biliary dilatation. The kidneys are symmetric without hydronephrosis. There are stable right kidney   cysts. There are no dilated bowel loops. There is mild right hemicolon wall thickening   with minimal adjacent stranding. The appendix is normal.         There are no enlarged lymph nodes. There is a small amount of free fluid. There   is no free air. The aorta tapers without aneurysm. The urinary bladder is normal.  There is no pelvic mass. There is no acute fracture or aggressive bony lesion. 12/01/21 1840  CT HEAD WO CONT Final result    Impression:      1. No acute intracranial abnormality is confirmed. 2. Microvascular ischemic and age-related change mild and stable. Narrative:  EXAM: CT HEAD WO CONT       INDICATION: falls, weakness       COMPARISON: 11/23/2021. CONTRAST: None. TECHNIQUE: Unenhanced CT of the head was performed using 5 mm images. Brain and   bone windows were generated. Coronal and sagittal reformats. CT dose reduction   was achieved through use of a standardized protocol tailored for this   examination and automatic exposure control for dose modulation. FINDINGS:   The ventricles and sulci are normal in size, shape and configuration. . Mild   periventricular white matter low-density is stable. . There is no intracranial   hemorrhage, extra-axial collection, or mass effect. The basilar cisterns are   open. No CT evidence of acute infarct. The bone windows demonstrate no acute abnormalities.  Stable mucoperiosteal thickening significant in the right maxillary sinus with a large retention cyst   in the left maxillary sinus. .               CXR Results  (Last 48 hours)               12/01/21 1909  XR CHEST PORT Final result    Impression:  No acute cardiopulmonary disease radiographically. .  . Narrative:  INDICATION:  falls, FTT        EXAM: Chest single view. COMPARISON: None. Petra Suarez FINDINGS: A single frontal view of the chest at 1905 hours shows clear lungs. The heart, mediastinum and pulmonary vasculature are normal .  The bony thorax   is unremarkable for age. .                 Medical Decision Making   I am the first provider for this patient. I reviewed the vital signs, available nursing notes, past medical history, past surgical history, family history and social history. Vital Signs-Reviewed the patient's vital signs. Patient Vitals for the past 12 hrs:   Temp Pulse Resp BP SpO2   12/01/21 1822 97.6 °F (36.4 °C) 74 14 137/71 100 %     Records Reviewed: Nursing Notes    Provider Notes (Medical Decision Making):   33-year-old female presenting with failure to thrive, recurrent falls, and need for placement. She is afebrile and vital signs are stable. She has no focal neurologic deficits. No sign of asterixis. Ammonia level is only 36 so I do not feel hepatic encephalopathy is likely. No sign of infection on work-up but still awaiting urinalysis. No significant leukocytosis and she is afebrile. Mildly hypokalemic at 3.2 which was replaced in the ED. CT head, C-spine, chest, abdomen, pelvis without acute traumatic findings. Will discuss with hospitalist for admission for PT, OT consultation, case management, and likely placement. ED Course:   Initial assessment performed. The patients presenting problems have been discussed, and they are in agreement with the care plan formulated and outlined with them.   I have encouraged them to ask questions as they arise throughout their visit.    ED Course as of 12/01/21 2257   Wed Dec 01, 2021   1936 EKG per my interpretation normal sinus rhythm, rate 75 bpm, leftward axis, no acute ischemic changes, prolonged  ms. [AK]      ED Course User Index  [AK] Dang Cramer MD         Cardiac Monitoring: The cardiac monitor revealed the following rhythm as interpreted by me: Normal Sinus Rhythm, rate 74 bpm  The cardiac monitor was ordered secondary to the patient's reported complaint of recurrent falls and weakness and to monitor the patient for dysrhythmia. Teresa Heredia MD      Admission Note:  Patient is being admitted to the hospital by Dr. Pam Chapman, Service: Hospitalist.  The results of their tests and reasons for their admission have been discussed with them and available family. They convey agreement and understanding for the need to be admitted and for their admission diagnosis. Disposition:  Admit    Diagnosis     Clinical Impression:   1. Recurrent falls    2. Ambulatory dysfunction    3. Failure to thrive in adult        Attestations:  I am the first and primary provider of record for this patient's ED encounter. I personally performed the services described above in this documentation. Teresa Heredia MD    Please note that this dictation was completed with "[employer] the [employer] voice recognition software. Quite often unanticipated grammatical, syntax, homophones, and other interpretive errors are inadvertently transcribed by the computer software. Please disregard these errors. Please excuse any errors that have escaped final proofreading. Thank you.

## 2021-12-02 NOTE — PROGRESS NOTES
Physical Therapy     Received PT order. Pt BUTCH for echo. Will follow for eval as pt is appropriate and available.      Elizabeth Rodgers, PT

## 2021-12-02 NOTE — PROGRESS NOTES
Problem: Falls - Risk of  Goal: *Absence of Falls  Description: Document [place] Flow Fall Risk and appropriate interventions in the flowsheet. 12/2/2021 1643 by Wilber Burger RN  Outcome: Progressing Towards Goal  Note: Fall Risk Interventions:  Mobility Interventions: Bed/chair exit alarm, Communicate number of staff needed for ambulation/transfer, Patient to call before getting OOB         Medication Interventions: Assess postural VS orthostatic hypotension, Bed/chair exit alarm, Patient to call before getting OOB         History of Falls Interventions: Bed/chair exit alarm, Door open when patient unattended, Investigate reason for fall      12/2/2021 1642 by Wilber Burger RN  Note: Fall Risk Interventions:  Mobility Interventions: Bed/chair exit alarm, Communicate number of staff needed for ambulation/transfer, Patient to call before getting OOB         Medication Interventions: Assess postural VS orthostatic hypotension, Bed/chair exit alarm, Patient to call before getting OOB         History of Falls Interventions: Bed/chair exit alarm, Door open when patient unattended, Investigate reason for fall         Problem: Pressure Injury - Risk of  Goal: *Prevention of pressure injury  Description: Document Mark Scale and appropriate interventions in the flowsheet. 12/2/2021 1643 by Wilber Burger RN  Outcome: Progressing Towards Goal  Note: Pressure Injury Interventions:  Sensory Interventions: Assess changes in LOC, Assess need for specialty bed    Moisture Interventions: Apply protective barrier, creams and emollients, Absorbent underpads, Contain wound drainage, Moisture barrier, Maintain skin hydration (lotion/cream), Minimize layers    Activity Interventions: Pressure redistribution bed/mattress(bed type), Increase time out of bed    Mobility Interventions: Assess need for specialty bed, HOB 30 degrees or less, Float heels, Turn and reposition approx.  every two hours(pillow and wedges)    Nutrition Interventions: Document food/fluid/supplement intake                  12/2/2021 1642 by Gaby Jenkins RN  Note: Pressure Injury Interventions:  Sensory Interventions: Assess changes in LOC, Assess need for specialty bed    Moisture Interventions: Apply protective barrier, creams and emollients, Absorbent underpads, Contain wound drainage, Moisture barrier, Maintain skin hydration (lotion/cream), Minimize layers    Activity Interventions: Pressure redistribution bed/mattress(bed type), Increase time out of bed    Mobility Interventions: Assess need for specialty bed, HOB 30 degrees or less, Float heels, Turn and reposition approx.  every two hours(pillow and wedges)    Nutrition Interventions: Document food/fluid/supplement intake

## 2021-12-02 NOTE — PROGRESS NOTES
1424: Report received from \[facility]\"" for routine progression of care. Report consisted of SBAR, Kardex, Recent Results, MAR, Intake/Output, and cardiac rhythm. 1430: Patient received on unit. Vitals and admission assessment completed. End of Shift Note    Bedside shift change report given to GUSTAVO Resendez(oncoming nurse) by Miriam Treadwell RN (offgoing nurse). Report included the following information SBAR, Kardex, Intake/Output, MAR, Recent Results and Cardiac Rhythm sinus rhythm     Shift worked:  [id number]     Shift summary and any significant changes:     no significant changes   Concerns for physician to address:       Zone phone for oncoming shift:          Activity:  Activity Level: Bed Rest  Number times ambulated in hallways past shift: 0  Number of times OOB to chair past shift: 0    Cardiac:   Cardiac Monitoring: Yes      Cardiac Rhythm: Sinus Rhythm    Access:   Current line(s): PIV     Genitourinary:   Urinary status: voiding and external catheter    Respiratory:   O2 Device: None (Room air)  Chronic home O2 use?: NO  Incentive spirometer at bedside: NO     GI:  Last Bowel Movement Date: 12/02/21  Current diet:  ADULT ORAL NUTRITION SUPPLEMENT Breakfast; Standard High Calorie/High Protein  ADULT DIET Regular; Low Fat/Low Chol/High Fiber/WILLIS; Chocolate flavor Ensure Enlive every day with breakfast meal  Passing flatus: YES  Tolerating current diet: YES       Pain Management:   Patient states pain is manageable on current regimen: YES    Skin:  Mark Score: 17  Interventions: increase time out of bed, limit briefs and internal/external urinary devices    Patient Safety:  Fall Score:  Total Score: 2  Interventions: bed/chair alarm, gripper socks and pt to call before getting OOB       Length of Stay:  Expected LOS: - - -  Actual LOS: 1      Miriam Treadwell RN

## 2021-12-02 NOTE — PROGRESS NOTES
Physical Therapy    Pt currently BUTCH transferring to unit. Will follow tomorrow for eval accordingly.     Karolina Lafleur PT

## 2021-12-02 NOTE — PROGRESS NOTES
Problem: Falls - Risk of  Goal: *Absence of Falls  Description: Document Tigre Sol Fall Risk and appropriate interventions in the flowsheet. 12/2/2021 1643 by Drew Sweeney RN  Outcome: Progressing Towards Goal  Note: Fall Risk Interventions:  Mobility Interventions: Bed/chair exit alarm, Communicate number of staff needed for ambulation/transfer, Patient to call before getting OOB         Medication Interventions: Assess postural VS orthostatic hypotension, Bed/chair exit alarm, Patient to call before getting OOB         History of Falls Interventions: Bed/chair exit alarm, Door open when patient unattended, Investigate reason for fall      12/2/2021 1642 by Drew Sweeney, RN  Note: Fall Risk Interventions:  Mobility Interventions: Bed/chair exit alarm, Communicate number of staff needed for ambulation/transfer, Patient to call before getting OOB         Medication Interventions: Assess postural VS orthostatic hypotension, Bed/chair exit alarm, Patient to call before getting OOB         History of Falls Interventions: Bed/chair exit alarm, Door open when patient unattended, Investigate reason for fall         Problem: Pressure Injury - Risk of  Goal: *Prevention of pressure injury  Description: Document Mark Scale and appropriate interventions in the flowsheet. 12/2/2021 1643 by Drew Sweeney RN  Outcome: Progressing Towards Goal  Note: Pressure Injury Interventions:  Sensory Interventions: Assess changes in LOC, Assess need for specialty bed    Moisture Interventions: Apply protective barrier, creams and emollients, Absorbent underpads, Contain wound drainage, Moisture barrier, Maintain skin hydration (lotion/cream), Minimize layers    Activity Interventions: Pressure redistribution bed/mattress(bed type), Increase time out of bed    Mobility Interventions: Assess need for specialty bed, HOB 30 degrees or less, Float heels, Turn and reposition approx.  every two hours(pillow and wedges)    Nutrition Interventions: Document food/fluid/supplement intake                  12/2/2021 1642 by Ronaldo Gaffney RN  Note: Pressure Injury Interventions:  Sensory Interventions: Assess changes in LOC, Assess need for specialty bed    Moisture Interventions: Apply protective barrier, creams and emollients, Absorbent underpads, Contain wound drainage, Moisture barrier, Maintain skin hydration (lotion/cream), Minimize layers    Activity Interventions: Pressure redistribution bed/mattress(bed type), Increase time out of bed    Mobility Interventions: Assess need for specialty bed, HOB 30 degrees or less, Float heels, Turn and reposition approx.  every two hours(pillow and wedges)    Nutrition Interventions: Document food/fluid/supplement intake                     Problem: Hypertension  Goal: *Blood pressure within specified parameters  Outcome: Progressing Towards Goal  Goal: *Labs within defined limits  Outcome: Progressing Towards Goal

## 2021-12-02 NOTE — PROGRESS NOTES
Comprehensive Nutrition Assessment    Type and Reason for Visit: Initial, Positive nutrition screen    Nutrition Recommendations/Plan:   · Continue Cardiac diet. · RD ordered Ensure Enlive (neal) po once daily with breakfast.  · Please document % meals and supplements consumed in flowsheet I/O's under intake. Nutrition Assessment:      12/2: Chart reviewed; med noted for generalized weakness on admission and failure to thrive with a hx of HTN, elevated lipids. Pt reports she initially experienced weight loss intentionally per the direction of one of her providers. Then, pt reports weight loss persisted greater than initial intentions. Pt also reports that she at some point loss her taste. RD explained Cardiac diet and ordering process. Pt interested in receiving Chocolate ensure Enlive once daily with breakfast.    No data found. Last Weight Metric  Weight Loss Metrics 12/2/2021 12/1/2021 11/23/2021 10/4/2021 8/5/2021   Today's Wt 169 lb 15.6 oz - 170 lb 160 lb 184 lb   BMI - 29.18 kg/m2 30.11 kg/m2 28.34 kg/m2 32.59 kg/m2     Malnutrition Assessment:  Malnutrition Status: Moderate malnutrition    Context:  Chronic illness     Findings of the 6 clinical characteristics of malnutrition:   Energy Intake:  7 - 75% or less est energy requirements for 1 month or longer  Weight Loss:  Unable to assess     Body Fat Loss:  1 - Mild body fat loss, Triceps, Orbital   Muscle Mass Loss:  1 - Mild muscle mass loss, Hand (interosseous), Temples (temporalis), Clavicles (pectoralis &deltoids), Scapula (trapezius)  Fluid Accumulation:  Unable to assess,     Strength:  Not performed     Estimated Daily Nutrient Needs:  Energy (kcal): 1665 (BMR 1281 x 1. 3AF);  Weight Used for Energy Requirements: Current  Protein (g): 77 (1.0 g/kg bw); Weight Used for Protein Requirements: Current  Fluid (ml/day): 6289-7672 ml/day; Method Used for Fluid Requirements: 1 ml/kcal    Nutrition Related Findings:  BM: none documented; Meds: Lipitor, Lopressor, NaCl @ 100 ml/hr      Wounds:    None       Current Nutrition Therapies:  ADULT ORAL NUTRITION SUPPLEMENT Breakfast; Standard High Calorie/High Protein  ADULT DIET Regular; Low Fat/Low Chol/High Fiber/WILLIS; Chocolate flavor Ensure Enlive every day with breakfast meal    Anthropometric Measures:  · Height:  5' 4\" (162.6 cm)  · Current Body Wt:  77.1 kg (169 lb 15.6 oz)   · Ideal Body Wt:  120 lbs:  141.6 %   · BMI Category: Overweight (BMI 25.0-29. 9)       Nutrition Diagnosis:   · Inadequate protein-energy intake related to  (decreased appetite with reported loss of taste) as evidenced by weight loss    Nutrition Interventions:   Food and/or Nutrient Delivery: Continue current diet, Start oral nutrition supplement  Nutrition Education and Counseling: No recommendations at this time  Coordination of Nutrition Care: Continue to monitor while inpatient    Goals:  Trend PO intake at least 50% of meals + consume 240 ml ONS next 3-5 days       Nutrition Monitoring and Evaluation:   Behavioral-Environmental Outcomes: None identified  Food/Nutrient Intake Outcomes: Food and nutrient intake, Supplement intake  Physical Signs/Symptoms Outcomes: Biochemical data, Weight    Discharge Planning:    Continue current diet, Continue oral nutrition supplement     Electronically signed by Travon Schmitz RD on 12/2/2021 at 4:26 PM

## 2021-12-02 NOTE — PROGRESS NOTES
0700: Bedside shift change report given to Mary Ellen Reynolds RN (oncoming nurse) by 6 [facility] [address], RN (offgoing nurse). Report included the following information SBAR and Kardex. 2575: Patient going charlie to echo.

## 2021-12-02 NOTE — PROGRESS NOTES
1427: TRANSFER - OUT REPORT:    Verbal report given to Snehal RN(name) on Landen Webb  being transferred to [facility] (unit) for routine progression of care       Report consisted of patients Situation, Background, Assessment and   Recommendations(SBAR). Information from the following report(s) SBAR, Kardex and ED Summary was reviewed with the receiving nurse. Lines:   Peripheral IV 12/01/21 Left Antecubital (Active)   Site Assessment Clean, dry, & intact 12/02/21 0804   Phlebitis Assessment 0 12/02/21 0804   Infiltration Assessment 0 12/02/21 0804   Dressing Status Clean, dry, & intact 12/02/21 0804   Dressing Type Transparent 12/02/21 0804   Hub Color/Line Status Flushed 12/02/21 0804        Opportunity for questions and clarification was provided.       Patient transported with:   [facility]

## 2021-12-02 NOTE — PROGRESS NOTES
Attempted to see pt for OT services. Pt is currently off the floor for testing. Will defer but continue to follow.

## 2021-12-02 NOTE — ACP (ADVANCE CARE PLANNING)
Advance Care Planning Note      NAME: Douglas Ceron   :  1950   MRN:  [id number]     Date/Time:  2021 11:36 PM    Active Diagnoses:  Hospital Problems  Date Reviewed: 2021          Codes Class Noted POA    Adult failure to thrive ICD-10-CM: R62.7  ICD-9-CM: 783.7  2021 Unknown        Recurrent falls ICD-10-CM: R29.6  ICD-9-CM: V15.88  2021 Unknown        * (Principal) Weakness generalized ICD-10-CM: R53.1  ICD-9-CM: 780.79  2021 Unknown        Generalized weakness ICD-10-CM: R53.1  ICD-9-CM: 780.79  2021 Unknown        HTN (hypertension) ICD-10-CM: I10  ICD-9-CM: 401.9  Unknown Yes        Acquired hypothyroidism ICD-10-CM: E03.9  ICD-9-CM: 244.9  Unknown Yes        Elevated LFTs ICD-10-CM: R79.89  ICD-9-CM: 790.6  Unknown Yes        Hyperlipidemia ICD-10-CM: E78.5  ICD-9-CM: 272.4  Unknown Yes              These active diagnoses are of sufficient risk that focused discussion on advance care planning is indicated in order to allow the patient to thoughtfully consider personal goals of care, and if situations arise that prevent the ability to personally give input, to ensure appropriate representation of their personal desires for different levels and aggressiveness of care. Discussion:   Code status addressed and wants to be a Full Code. Patient wants central line and vasopressors if needed. Patient would NOT want a feeding tube, if needed, for nutritional support. Patient  would like to assign Laurel Lemos (Sister) [phone]  as the surrogate decision maker. Persons present and participating in discussion: BABS Dunne      Time Spent:   Total time spent face-to-face in education and discussion:   18  minutes.          Ashlyn Brown DNP, ACNP-BC   Hospitalist

## 2021-12-02 NOTE — PROGRESS NOTES
Hospitalist Progress Note    NAME: Dk Room   :  1950   MRN:  [id number]       Assessment / Plan:   Generalized weakness (2021)    Recurrent falls (2021)    Adult failure to thrive (2021)    Decreased urine output  · Admit to telemetry  · Orthostatic blood pressure  · Echocardiogram  · UA with reflex culture  · IV fluid as patient appears to be clinically dehydrated  · Post void bladder scans  · Fall precaution  · CBC, CMP, magnesium, lactic acid level, NT proBNP     :  PT/OT  Follow up Echo  Labs reviewed         HTN (hypertension)   · Continue home antihypertensive medications  · Monitor          Acquired hypothyroidism   · Check TSH and free T4          Hyperlipidemia   · Continue Lipitor  · Check lipid panel           25.0 - 29.9 Overweight / Body mass index is 29.18 kg/m². Estimated discharge date:   Barriers:    Code status: Full  Prophylaxis: Lovenox  Recommended Disposition: TBD     Subjective:     Chief Complaint / Reason for Physician Visit  Still feeling weak. Discussed with RN events overnight. Review of Systems:  Symptom Y/N Comments  Symptom Y/N Comments   Fever/Chills    Chest Pain     Poor Appetite    Edema     Cough    Abdominal Pain     Sputum    Joint Pain     SOB/CHAN    Pruritis/Rash     Nausea/vomit    Tolerating PT/OT     Diarrhea    Tolerating Diet     Constipation    Other       Could NOT obtain due to:      Objective:     VITALS:   Last 24hrs VS reviewed since prior progress note.  Most recent are:  Patient Vitals for the past 24 hrs:   Temp Pulse Resp BP SpO2   21 1241 -- -- -- 122/67 --   21 1130 98.9 °F (37.2 °C) 70 18 122/67 97 %   21 0942 -- -- -- -- 97 %   21 0933 -- 78 -- 118/64 --   21 0803 97.8 °F (36.6 °C) 78 16 109/68 95 %   21 0600 -- 67 15 106/60 96 %   21 1822 97.6 °F (36.4 °C) 74 14 137/71 100 %     No intake or output data in the 24 hours ending 21 1303     I had a face to face encounter and independently examined this patient on 12/2/2021, as outlined below:  PHYSICAL EXAM:  General: WD, WN. Alert, cooperative, no acute distress    EENT:  EOMI. Anicteric sclerae. MMM  Resp:  CTA bilaterally, no wheezing or rales. No accessory muscle use  CV:  Regular  rhythm,  No edema  GI:  Soft, Non distended, Non tender. +Bowel sounds  Neurologic:  Alert and oriented X 3, normal speech,   Psych:   Good insight. Not anxious nor agitated  Skin:  No rashes. No jaundice    Reviewed most current lab test results and cultures  YES  Reviewed most current radiology test results   YES  Review and summation of old records today    NO  Reviewed patient's current orders and MAR    YES  PMH/SH reviewed - no change compared to H&P  ________________________________________________________________________  Care Plan discussed with:    Comments   Patient x    Family      RN x    Care Manager     Consultant                        Multidiciplinary team rounds were held today with , nursing, pharmacist and clinical coordinator. Patient's plan of care was discussed; medications were reviewed and discharge planning was addressed. ________________________________________________________________________  Total NON critical care TIME:  35   Minutes    Total CRITICAL CARE TIME Spent:   Minutes non procedure based      Comments   >50% of visit spent in counseling and coordination of care     ________________________________________________________________________  Lamar Steen MD     Procedures: see electronic medical records for all procedures/Xrays and details which were not copied into this note but were reviewed prior to creation of Plan. LABS:  I reviewed today's most current labs and imaging studies.   Pertinent labs include:  Recent Labs     12/02/21  0354 12/01/21 1903   WBC 4.3 3.8   HGB 10.1* 10.5*   HCT 30.3* 31.9*   PLT 91* 90*     Recent Labs     12/02/21  0354 12/01/21  1903    137   K 3.7 3.2*    102   CO2 24 26   GLU 68 79   BUN 15 17   CREA 0.69 0.76   CA 8.5 8.9   MG 2.1  --    ALB 2.5* 3.0*   TBILI 1.8* 2.0*   ALT 99* 115*       Signed: Alexa Barraza MD

## 2021-12-03 NOTE — PROGRESS NOTES
Occupational Therapy  Orders received and medical record reviewed. Pt participated in OT Evaluation. Pt has significant fall history and demonstrates decreased insight into deficits/decreased safety awareness. She is alert and oriented and will need rehab at discharge. Transition to AL/LTC may be appropriate post rehab. Full OT note to follow.

## 2021-12-03 NOTE — PROGRESS NOTES
Orders received, chart reviewed and patient evaluated by physical therapy. Pending progression with skilled acute physical therapy, recommend:  Therapy up to 5 days/week in SNF setting with transition to long term care. Recommend with nursing OOB to chair 3x/day and walking daily with min assist and walker. Thank you for completing as able in order to maintain patient strength, endurance and independence. Full evaluation to follow.

## 2021-12-03 NOTE — PROGRESS NOTES
Problem: Self Care Deficits Care Plan (Adult)  Goal: *Acute Goals and Plan of Care (Insert Text)  Description:   FUNCTIONAL STATUS PRIOR TO ADMISSION: Pt reports that she lives alone. She has HH OT PT SLP PTA. Pt states that she is independent in self care/mobility and reports frequent falls-multiple bruises     HOME SUPPORT: has [facility]    Occupational Therapy Goals  Initiated 12/3/2021  1. Patient will perform grooming in standing with supervision/set-up within 7 day(s). 2.  Patient will perform bathing with minimal assistance/contact guard assist within 7 day(s). 3.  Patient will perform upper body dressing and lower body dressing with minimal assistance/contact guard assist within 7 day(s). 4.  Patient will perform toilet transfers with supervision/set-up within 7 day(s). 5.  Patient will perform all aspects of toileting with minimal assistance/contact guard assist within 7 day(s). 6.  Patient will participate in upper extremity therapeutic exercise/activities with supervision/set-up for 6 minutes within 7 day(s). 7.  Patient will tolerate standing adls for at least 7 minutes with minimal verbal cues within 7 day(s). 8.  Patient will demonstrate awareness of safe technique during adls within 7 days. Outcome: Not Met   OCCUPATIONAL THERAPY EVALUATION  Patient: Alonso Barnard (51 y.o. female)  Date: 12/3/2021  Primary Diagnosis: Generalized weakness [R53.1]  Recurrent falls [R29.6]  Adult failure to thrive [R62.7]        Precautions:   Fall, Bed Alarm    ASSESSMENT  Based on the objective data described below, the patient presents with significant fall history, decreased balance,  baseline vision impairment (since birth) generalized weakness, confusion, and decreased tolerance for adls and functional mobility. Pt reports that she was independent at baseline for adls and used a RW; she is functioning below her baseline and will benefit from acute OT services.   At discharge anticipate need for SNF rehab. .    Current Level of Function Impacting Discharge (ADLs/self-care): up to maximal assistance for self care    Functional Outcome Measure: The patient scored Total: 40/100 on the Barthel Index outcome measure which is indicative of being \"partially dependent\"  in basic self-care. Other factors to consider for discharge: serious falls with injury, increasing weakness, confusion, ? Vision? Patient will benefit from skilled therapy intervention to address the above noted impairments. PLAN :  Recommendations and Planned Interventions: self care training, functional mobility training, therapeutic exercise, balance training, visual/perceptual training, therapeutic activities, cognitive retraining, endurance activities, neuromuscular re-education, patient education, home safety training, and family training/education    Frequency/Duration: Patient will be followed by occupational therapy 4 times a week to address goals. Recommendation for discharge: (in order for the patient to meet his/her long term goals)  Therapy up to 5 days/week in SNF setting    This discharge recommendation:  Has been made in collaboration with the attending provider and/or case management    IF patient discharges home will need the following DME: tbd       SUBJECTIVE:   Patient reported that she lives alone and was independent. She is unable to report what she was working on in her [facility] therapies.     OBJECTIVE DATA SUMMARY:   HISTORY:   Past Medical History:   Diagnosis Date    Breast cancer ([address]     HTN (hypertension)     Hyperlipidemia      Past Surgical History:   Procedure Laterality Date    HX BILATERAL MASTECTOMY         Expanded or extensive additional review of patient history:     Home Situation  Home Environment: Private residence  # Steps to Enter: 5  Rails to Enter: Yes  Hand Rails : Right  Wheelchair Ramp: No  One/Two Story Residence: One story  Living Alone: Yes  Support Systems: Other Family Member(s), Friend/Neighbor  Patient Expects to be Discharged to[de-identified] Unknown  Current DME Used/Available at Home: Cane, straight, Grab bars, Walker, rolling, Safety frame toliet  Tub or Shower Type: Shower    Hand dominance: Right    EXAMINATION OF PERFORMANCE DEFICITS:  Cognitive/Behavioral Status:  Neurologic State: Alert  Orientation Level: Appropriate for age  Cognition: Poor safety awareness; Impulsive; Impaired decision making  Perception:  (question--pt reports has new glasses ordered, impaired L eye)     Safety/Judgement: Decreased awareness of need for safety; Decreased awareness of need for assistance; Decreased awareness of environment; Lack of insight into deficits    Skin: multiple bruises throughout--small and large    Edema: none observed    Hearing: Auditory  Auditory Impairment: None    Vision/Perceptual:                           Acuity: Impaired near vision; Impaired far vision (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)    Corrective Lenses: Glasses (states has new glasses on order)    Range of Motion:  BUEs    AROM: Generally decreased, functional                         Strength:  BUEs:    Strength: Generally decreased, functional                Coordination:  Coordination: Generally decreased, functional            Tone & Sensation:  Sensation: not assessed  Tone: Normal                         Balance:  Sitting: Impaired  Sitting - Static: Fair (occasional)  Sitting - Dynamic: Fair (occasional)  Standing: Impaired; With support  Standing - Static: Fair; Constant support  Standing - Dynamic : Fair; Constant support    Functional Mobility and Transfers for ADLs:  Bed Mobility:  Supine to Sit: Minimum assistance; Additional time  Scooting: Moderate assistance; Additional time (to scoot towards EOB in sitting)    Transfers:  Sit to Stand: Minimum assistance  Stand to Sit: Minimum assistance (uncontrolled descent into sitting)  Bed to Chair: Minimum assistance; Additional time;  Adaptive equipment (use of RW)  Bathroom Mobility: Minimum assistance; Moderate assistance  Toilet Transfer : Minimum assistance; Moderate assistance  Assistive Device : Walker    ADL Assessment:  Feeding: Setup    Oral Facial Hygiene/Grooming: Setup (seated)    Bathing: Moderate assistance; Maximum assistance    Upper Body Dressing: Minimum assistance    Lower Body Dressing: Maximum assistance    Toileting: Moderate assistance                ADL Intervention and task modifications:     Pt was incontinent requiring total assistance to clean. Pt with strong posterior lean and resistance to leaning forward at EoB to keep feet on floor and improve balance. Pt was able to transfer to [facility] and was set up in chair. Set up for seated grooming post activity. Pt reports that she sometimes gets dizzy at home, but did not complain this date. BP generally stable                                Cognitive Retraining  Safety/Judgement: Decreased awareness of need for safety; Decreased awareness of need for assistance; Decreased awareness of environment; Lack of insight into deficits    Therapeutic Exercise:  Encouraged upright in chair for meals and with nursing to bathroom   Functional Measure:    Barthel Index:  Bathin  Bladder: 0  Bowels: 5  Groomin  Dressin  Feeding: 10  Mobility: 0  Stairs: 0  Toilet Use: 5  Transfer (Bed to Chair and Back): 10  Total: 40/100      The Barthel ADL Index: Guidelines  1. The index should be used as a record of what a patient does, not as a record of what a patient could do. 2. The main aim is to establish degree of independence from any help, physical or verbal, however minor and for whatever reason. 3. The need for supervision renders the patient not independent. 4. A patient's performance should be established using the best available evidence. Asking the patient, friends/relatives and nurses are the usual sources, but direct observation and common sense are also important.  However direct testing is not needed. 5. Usually the patient's performance over the preceding 24-48 hours is important, but occasionally longer periods will be relevant. 6. Middle categories imply that the patient supplies over 50 per cent of the effort. 7. Use of aids to be independent is allowed. Score Interpretation (from [address])    Independent   60-79 Minimally independent   40-59 Partially dependent   20-39 Very dependent   <20 Totally dependent     -Tom Reis., Barthel, DNORMAN. (1965). Functional evaluation: the Barthel Index. [address] ([address] (1997). The Barthel activities of daily living index: self-reporting versus actual performance in the old (> or = 75 years). Journal [address](7), [address] [facility], KELSEY, Indra Saucedo., Elizabeth Rodriguez. (1999). Measuring the change in disability after inpatient rehabilitation; comparison of the responsiveness of the Barthel Index and Functional [place] Measure. Journal of Neurology, Neurosurgery, and Psychiatry, 66(4), 136-209. Maria Esther Ramos, N.J.A, NAHID Don, & Neha Jensen, M.A. (2004) Assessment of post-stroke quality of life in cost-effectiveness studies: The usefulness of the Barthel Index and the EuroQoL-5D. Quality of Life Research, 15, 039-63     Occupational Therapy Evaluation Charge Determination   History Examination Decision-Making   MEDIUM Complexity : Expanded review of history including physical, cognitive and psychosocial  history  MEDIUM Complexity : 3-5 performance deficits relating to physical, cognitive , or psychosocial skils that result in activity limitations and / or participation restrictions MEDIUM Complexity : Patient may present with comorbidities that affect occupational performnce.  Miniml to moderate modification of tasks or assistance (eg, physical or verbal ) with assesment(s) is necessary to enable patient to complete evaluation Based on the above components, the patient evaluation is determined to be of the following complexity level: MEDIUM  Pain Rating:  Did not c/o pain    Activity Tolerance:   BP monitored throughout and was generally stable  BP post activity seated in chair:  110/66  HR 60,   After treatment patient left in no apparent distress:    Sitting in chair, Call bell within reach, Bed / chair alarm activated, and nurse aware    COMMUNICATION/EDUCATION:   The patients plan of care was discussed with: Physical therapist, Registered nurse, and Case management. Patient is unable to participate in goal setting and plan of care. This patients plan of care is appropriate for delegation to [facility].     Thank you for this referral.  Renzo Zelaya OTR/TENNILLE  Time Calculation: 39 mins

## 2021-12-03 NOTE — PROGRESS NOTES
Hospitalist Progress Note    NAME: Shayla Hartman   :  1950   MRN:  [id number]       Assessment / Plan:   Generalized weakness (2021)    Recurrent falls (2021)    Adult failure to thrive (2021)    Decreased urine output  · Admit to telemetry  · Orthostatic blood pressure  · Echocardiogram  · UA with reflex culture  · IV fluid as patient appears to be clinically dehydrated  · Post void bladder scans  · Fall precaution  · CBC, CMP, magnesium, lactic acid level, NT proBNP     PT OT recommended SNF  Case management consult  Echo showed EF of 55%, no valvular disease  Abnormal LFTs, will trend  :  PT/OT  Follow up Echo  Labs reviewed         HTN (hypertension)   · Continue home antihypertensive medications  · Monitor          Acquired hypothyroidism   · TSH within normal limit, mildly elevated free T4  · We will decrease her Synthroid dose from 112 to 100, recheck TSH within 4 to 6 weeks          Hyperlipidemia   · Continue Lipitor  · Lipid profile within normal limit           25.0 - 29.9 Overweight / Body mass index is 26.95 kg/m². Estimated discharge date:   Barriers:    Code status: Full  Prophylaxis: Lovenox  Recommended Disposition: TBD     Subjective:     Chief Complaint / Reason for Physician Visit  Follow-up weakness, no acute issues  . Discussed with RN events overnight. Review of Systems:  Symptom Y/N Comments  Symptom Y/N Comments   Fever/Chills n   Chest Pain n    Poor Appetite    Edema     Cough    Abdominal Pain n    Sputum    Joint Pain     SOB/CHAN n   Pruritis/Rash     Nausea/vomit    Tolerating PT/OT     Diarrhea    Tolerating Diet y    Constipation    Other       Could NOT obtain due to:      Objective:     VITALS:   Last 24hrs VS reviewed since prior progress note.  Most recent are:  Patient Vitals for the past 24 hrs:   Temp Pulse Resp BP SpO2   21 0820 97.4 °F (36.3 °C) 60 19 (!) 140/77 99 %   21 0737 -- -- -- -- 99 %   21 0500 98.2 °F (36.8 °C) (!) 57 15 111/68 95 %   12/02/21 2249 -- -- -- (!) 153/63 --   12/02/21 2246 -- -- -- 114/79 --   12/02/21 2244 98 °F (36.7 °C) 60 17 118/69 95 %   12/02/21 2000 -- 68 -- -- --   12/02/21 1939 -- -- -- -- 95 %   12/02/21 1923 97.9 °F (36.6 °C) 67 20 111/75 96 %   12/02/21 1738 -- 70 -- (!) 110/99 --   12/02/21 1430 97.6 °F (36.4 °C) 66 17 (!) 102/57 97 %   12/02/21 1415 98 °F (36.7 °C) 62 16 (!) 105/58 97 %   12/02/21 1241 -- -- -- 122/67 --   12/02/21 1130 98.9 °F (37.2 °C) 70 18 122/67 97 %   12/02/21 0942 -- -- -- -- 97 %       Intake/Output Summary (Last 24 hours) at 12/3/2021 0933  Last data filed at 12/3/2021 7965  Gross per 24 hour   Intake 240 ml   Output 750 ml   Net -510 ml        I had a face to face encounter and independently examined this patient on 12/3/2021, as outlined below:  PHYSICAL EXAM:  General: WD, WN. Alert, cooperative, no acute distress    EENT:  EOMI. Anicteric sclerae. MMM  Resp:  CTA bilaterally, no wheezing or rales. No accessory muscle use  CV:  Regular  rhythm,  No edema  GI:  Soft, Non distended, Non tender. +Bowel sounds  Neurologic:  Alert and oriented X 3, normal speech,   Psych:   Good insight. Not anxious nor agitated  Skin:  No rashes. No jaundice    Reviewed most current lab test results and cultures  YES  Reviewed most current radiology test results   YES  Review and summation of old records today    NO  Reviewed patient's current orders and MAR    YES  PMH/SH reviewed - no change compared to H&P  ________________________________________________________________________  Care Plan discussed with:    Comments   Patient x    Family      RN x    Care Manager     Consultant                        Multidiciplinary team rounds were held today with , nursing, pharmacist and clinical coordinator. Patient's plan of care was discussed; medications were reviewed and discharge planning was addressed. ________________________________________________________________________  Total NON critical care TIME:  35   Minutes    Total CRITICAL CARE TIME Spent:   Minutes non procedure based      Comments   >50% of visit spent in counseling and coordination of care     ________________________________________________________________________  Bert Mesa MD     Procedures: see electronic medical records for all procedures/Xrays and details which were not copied into this note but were reviewed prior to creation of Plan. LABS:  I reviewed today's most current labs and imaging studies.   Pertinent labs include:  Recent Labs     12/03/21  0054 12/02/21 0354 12/01/21  1903   WBC 3.5* 4.3 3.8   HGB 9.3* 10.1* 10.5*   HCT 28.6* 30.3* 31.9*   PLT 74* 91* 90*     Recent Labs     12/03/21  0054 12/02/21  0354 12/01/21  1903    136 137   K 3.4* 3.7 3.2*    102 102   CO2 24 24 26   GLU 88 68 79   BUN 14 15 17   CREA 0.72 0.69 0.76   CA 8.4* 8.5 8.9   MG 1.9 2.1  --    ALB 2.2* 2.5* 3.0*   TBILI 1.3* 1.8* 2.0*   ALT 95* 99* 115*       Signed: Bert Mesa MD

## 2021-12-03 NOTE — PROGRESS NOTES
End of Shift Note    Bedside shift change report given to RN (oncoming nurse) by Glo Cummins RN (offgoing nurse). Report included the following information SBAR, Kardex, ED Summary, Procedure Summary, Intake/Output, MAR, Recent Results, Med Rec Status and Cardiac Rhythm Sinus Rhythm    Shift worked:  7p-7a     Shift summary and any significant changes:    did orthostatic BP's, pt is very weak and barely able to stand with X2 assist d/t generalized weakness. Slept majority of the shift       Concerns for physician to address:  has no night time medicine     Zone phone for oncoming shift:         Activity:  Activity Level: Up with Assistance  Number times ambulated in hallways past shift: 0  Number of times OOB to chair past shift: 0    Cardiac:   Cardiac Monitoring: Yes      Cardiac Rhythm: Sinus Rhythm    Access:   Current line(s): PIV     Genitourinary:   Urinary status: voiding, incontinent and external catheter    Respiratory:   O2 Device: None (Room air)  Chronic home O2 use?: NO  Incentive spirometer at bedside: NO     GI:  Last Bowel Movement Date: 12/02/21  Current diet:  ADULT ORAL NUTRITION SUPPLEMENT Breakfast; Standard High Calorie/High Protein  ADULT DIET Regular; Low Fat/Low Chol/High Fiber/WILLIS; Chocolate flavor Ensure Enlive every day with breakfast meal  Passing flatus: YES  Tolerating current diet: YES       Pain Management:   Patient states pain is manageable on current regimen: YES    Skin:  Mark Score: 17  Interventions: float heels, increase time out of bed and limit briefs    Patient Safety:  Fall Score:  Total Score: 4  Interventions: bed/chair alarm, assistive device (walker, cane, etc), gripper socks, pt to call before getting OOB and stay with me (per policy)  High Fall Risk: Yes    Length of Stay:  Expected LOS: - - -  Actual LOS: [address]

## 2021-12-03 NOTE — PROGRESS NOTES
Problem: Mobility Impaired (Adult and Pediatric)  Goal: *Acute Goals and Plan of Care (Insert Text)  Description: FUNCTIONAL STATUS PRIOR TO ADMISSION: Patient is a poor historian, however reports ambulating with RW at baseline. Reports being mod I for ADLs, however does not shower unless aides are present (~4x/week). Notes significant fall history since April/May 2021. Reports that her neighbor helps her as needed. Has been receiving HHPT/OT/SLP. HOME SUPPORT PRIOR TO ADMISSION: The patient lived alone with neighbor to provide assistance. Physical Therapy Goals  Initiated 12/3/2021  1. Patient will move from supine to sit and sit to supine , scoot up and down, and roll side to side in bed with supervision/set-up within 7 day(s). 2.  Patient will transfer from bed to chair and chair to bed with supervision/set-up using the least restrictive device within 7 day(s). 3.  Patient will perform sit to stand with supervision/set-up within 7 day(s). 4.  Patient will ambulate with supervision/set-up for 100 feet with the least restrictive device within 7 day(s). 5.  Patient will ascend/descend 5 stairs with 1 handrail(s) with supervision/set-up within 7 day(s). 12/3/2021 1346 by Raymond Up PT  Outcome: Not Met  PHYSICAL THERAPY EVALUATION  Patient: Tanesha Keyes (32 y.o. female)  Date: 12/3/2021  Primary Diagnosis: Generalized weakness [R53.1]  Recurrent falls [R29.6]  Adult failure to thrive [R62.7]        Precautions:  Fall, Bed Alarm    ASSESSMENT  Based on the objective data described below, the patient presents with impaired functional mobility and decreased independence secondary to impaired sitting and standing balance, generalized weakness, impaired gait mechanics, baseline impaired vision, increased confusion, limited endurance, and decreased activity tolerance. Received supine in bed and agreeable to PT evaluation. VSS throughout session on RA.  Patient with significant fall history and demonstrates lack of insight into functional deficits and poor safety awareness overall. Exhibited posterior lean/LOB in sitting and standing, needing verbal and tactile cueing in addition to physical assist for fall prevention and to improve positioning into midline. Needed VCs for safe hand placement with use of RW and for RW management during bed>BSC transfer and directional changes. Pt was left sitting in bedside chair with all needs met, RN aware, VSS, and chair alarm on following therapy session. Recommend patient discharge to SNF rehab to address functional impairments as noted above as patient with decline from baseline mobility, lives alone, with poor safety awareness, and with increased risk for falls. Current Level of Function Impacting Discharge (mobility/balance): min/mod A for bed mobility; min A for transfers and gait with RW support    Functional Outcome Measure: The patient scored 40/100 on the Barthel Index outcome measure which is indicative of 60% impairment in ADLs and mobility. Other factors to consider for discharge: high fall risk; decline from baseline mobility; lives alone; lacks insight into deficits; decreased safety awareness     Patient will benefit from skilled therapy intervention to address the above noted impairments. PLAN :  Recommendations and Planned Interventions: bed mobility training, transfer training, gait training, therapeutic exercises, patient and family training/education, and therapeutic activities      Frequency/Duration: Patient will be followed by physical therapy:  4 times a week to address goals.     Recommendation for discharge: (in order for the patient to meet his/her long term goals)  Therapy up to 5 days/week in SNF setting    This discharge recommendation:  Has been made in collaboration with the attending provider and/or case management    IF patient discharges home will need the following DME: to be determined (TBD)         SUBJECTIVE: Patient stated I tend to pay attention to the time.     OBJECTIVE DATA SUMMARY:   HISTORY:    Past Medical History:   Diagnosis Date    Breast cancer ([facility] [address]     HTN (hypertension)     Hyperlipidemia      Past Surgical History:   Procedure Laterality Date    HX BILATERAL MASTECTOMY         Personal factors and/or comorbidities impacting plan of care: HTN; lives alone; high fall history    Home Situation  Home Environment: Private residence  # Steps to Enter: 5  Rails to Enter: Yes  Hand Rails : Right  Wheelchair Ramp: No  One/Two Story Residence: One story  Living Alone: Yes  Support Systems: Other Family Member(s), Friend/Neighbor  Patient Expects to be Discharged to[de-identified] Unknown  Current DME Used/Available at Home: Cane, straight, Grab bars, Walker, rolling, Safety frame toliet  Tub or Shower Type: Shower    EXAMINATION/PRESENTATION/DECISION MAKING:   Critical Behavior:  Neurologic State: Alert  Orientation Level: Appropriate for age  Cognition: Poor safety awareness, Impulsive, Impaired decision making  Safety/Judgement: Decreased awareness of need for safety, Decreased awareness of need for assistance, Decreased awareness of environment, Lack of insight into deficits  Hearing: Auditory  Auditory Impairment: None  Skin:  Intact with significant bruising in back  Edema: None  Range Of Motion:  AROM: Generally decreased, functional                       Strength:    Strength: Generally decreased, functional                    Tone & Sensation:   Tone: Normal                              Coordination:  Coordination: Generally decreased, functional  Vision:   Acuity: Impaired near vision; Impaired far vision (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)  Corrective Lenses: Glasses (states has new glasses on order)  Functional Mobility:  Bed Mobility:     Supine to Sit: Minimum assistance; Additional time     Scooting: Moderate assistance;  Additional time (to scoot towards EOB in sitting)  Transfers:  Sit to Stand: Minimum assistance  Stand to Sit: Minimum assistance (uncontrolled descent into sitting)        Bed to Chair: Minimum assistance; Additional time; Adaptive equipment (use of RW)              Balance:   Sitting: Impaired  Sitting - Static: Fair (occasional)  Sitting - Dynamic: Fair (occasional)  Standing: Impaired; With support  Standing - Static: Fair; Constant support  Standing - Dynamic : Fair; Constant support  Ambulation/Gait Training:  Distance (ft): 6 Feet (ft)  Assistive Device: Gait belt; Walker, rolling  Ambulation - Level of Assistance: Minimal assistance; Additional time; Adaptive equipment     Gait Description (WDL): Exceptions to WDL  Gait Abnormalities: Decreased step clearance; Shuffling gait (posterior lean/LOB)        Base of Support: Narrowed; Center of gravity altered     Speed/Winnie: Slow; Shuffled; Vincent Ek decreased (<100 feet/min)  Step Length: Left shortened; Right shortened    Functional Measure:  Barthel Index:    Bathin  Bladder: 0  Bowels: 5  Groomin  Dressin  Feeding: 10  Mobility: 0  Stairs: 0  Toilet Use: 5  Transfer (Bed to Chair and Back): 10  Total: 40/100       The Barthel ADL Index: Guidelines  1. The index should be used as a record of what a patient does, not as a record of what a patient could do. 2. The main aim is to establish degree of independence from any help, physical or verbal, however minor and for whatever reason. 3. The need for supervision renders the patient not independent. 4. A patient's performance should be established using the best available evidence. Asking the patient, friends/relatives and nurses are the usual sources, but direct observation and common sense are also important. However direct testing is not needed. 5. Usually the patient's performance over the preceding 24-48 hours is important, but occasionally longer periods will be relevant. 6. Middle categories imply that the patient supplies over 50 per cent of the effort.   7. Use of aids to be independent is allowed. Score Interpretation (from [facility])    Independent   60-79 Minimally independent   40-59 Partially dependent   20-39 Very dependent   <20 Totally dependent     -Tom Reis., Barthel, D.W. (1965). Functional evaluation: the Barthel Index. [facility] ([address] (1997). The Barthel activities of daily living index: self-reporting versus actual performance in the old (> or = 75 years). Journal of [address] [place] [address] 45(7), [facility], J.SUSANNAHF, Elijah Ortega., Shane Harmon. (1999). Measuring the change in disability after inpatient rehabilitation; comparison of the responsiveness of the Barthel Index and Functional Wayne Measure. Journal of Neurology, Neurosurgery, and Psychiatry, 66(4), 459-125. YONIS Fermin, NAHID Don, & Sailaja Cho MYESSI. (2004) Assessment of post-stroke quality of life in cost-effectiveness studies: The usefulness of the Barthel Index and the EuroQoL-5D.  Quality of Life Research, 15, 803-58           Physical Therapy Evaluation Charge Determination   History Examination Presentation Decision-Making   MEDIUM  Complexity : 1-2 comorbidities / personal factors will impact the outcome/ POC  HIGH Complexity : 4+ Standardized tests and measures addressing body structure, function, activity limitation and / or participation in recreation  MEDIUM Complexity : Evolving with changing characteristics  Other outcome measures Barthel Index  MEDIUM      Based on the above components, the patient evaluation is determined to be of the following complexity level: MEDIUM    Pain Rating:  No pain complaints    Activity Tolerance:   Fair, SpO2 stable on RA, and requires rest breaks    After treatment patient left in no apparent distress:   Sitting in chair, Call bell within reach, and Bed / chair alarm activated    COMMUNICATION/EDUCATION:   The patients plan of care was discussed with: Physical therapist, Occupational therapist, Registered nurse, and Case management. Fall prevention education was provided and the patient/caregiver indicated understanding., Patient/family have participated as able in goal setting and plan of care. , and Patient/family agree to work toward stated goals and plan of care.     Thank you for this referral.  Marija Henry, PT, DPT   Time Calculation: 39 mins

## 2021-12-03 NOTE — PROGRESS NOTES
Problem: Falls - Risk of  Goal: *Absence of Falls  Description: Document Tigre Sol Fall Risk and appropriate interventions in the flowsheet. Outcome: Progressing Towards Goal  Note: Fall Risk Interventions:  Mobility Interventions: Bed/chair exit alarm, Communicate number of staff needed for ambulation/transfer, Patient to call before getting OOB         Medication Interventions: Bed/chair exit alarm, Patient to call before getting OOB, Teach patient to arise slowly    Elimination Interventions: Bed/chair exit alarm, Call light in reach, Patient to call for help with toileting needs, Stay With Me (per policy), Toilet paper/wipes in reach, Toileting schedule/hourly rounds    History of Falls Interventions: Bed/chair exit alarm, Door open when patient unattended, Investigate reason for fall, Room close to nurse's station         Problem: Patient Education: Go to Patient Education Activity  Goal: Patient/Family Education  Outcome: Progressing Towards Goal     Problem: Pressure Injury - Risk of  Goal: *Prevention of pressure injury  Description: Document Mark Scale and appropriate interventions in the flowsheet.   Outcome: Progressing Towards Goal  Note: Pressure Injury Interventions:  Sensory Interventions: Assess changes in LOC, Assess need for specialty bed, Check visual cues for pain, Float heels, Keep linens dry and wrinkle-free, Maintain/enhance activity level, Minimize linen layers    Moisture Interventions: Absorbent underpads, Assess need for specialty bed, Check for incontinence Q2 hours and as needed, Limit adult briefs, Maintain skin hydration (lotion/cream), Minimize layers, Moisture barrier    Activity Interventions: Assess need for specialty bed, Increase time out of bed    Mobility Interventions: Assess need for specialty bed, Float heels, HOB 30 degrees or less    Nutrition Interventions: Document food/fluid/supplement intake    Friction and Shear Interventions: Apply protective barrier, creams and emollients, Feet elevated on foot rest, HOB 30 degrees or less, Lift sheet, Minimize layers                Problem: Patient Education: Go to Patient Education Activity  Goal: Patient/Family Education  Outcome: Progressing Towards Goal     Problem: Hypertension  Goal: *Blood pressure within specified parameters  Outcome: Progressing Towards Goal  Goal: *Fluid volume balance  Outcome: Progressing Towards Goal  Goal: *Labs within defined limits  Outcome: Progressing Towards Goal

## 2021-12-04 NOTE — PROGRESS NOTES
Problem: Falls - Risk of  Goal: *Absence of Falls  Description: Document Robin Sage Fall Risk and appropriate interventions in the flowsheet. Outcome: Progressing Towards Goal  Note: Fall Risk Interventions:  Mobility Interventions: Bed/chair exit alarm, Patient to call before getting OOB         Medication Interventions: Assess postural VS orthostatic hypotension, Bed/chair exit alarm, Patient to call before getting OOB, Teach patient to arise slowly    Elimination Interventions: Bed/chair exit alarm, Call light in reach, Patient to call for help with toileting needs    History of Falls Interventions: Bed/chair exit alarm, Door open when patient unattended         Problem: Pressure Injury - Risk of  Goal: *Prevention of pressure injury  Description: Document Mark Scale and appropriate interventions in the flowsheet.   Outcome: Progressing Towards Goal  Note: Pressure Injury Interventions:  Sensory Interventions: Assess changes in LOC, Float heels, Keep linens dry and wrinkle-free, Minimize linen layers    Moisture Interventions: Absorbent underpads, Check for incontinence Q2 hours and as needed, Internal/External urinary devices, Minimize layers    Activity Interventions: Assess need for specialty bed, Pressure redistribution bed/mattress(bed type), PT/OT evaluation    Mobility Interventions: Assess need for specialty bed, HOB 30 degrees or less, Float heels    Nutrition Interventions: Document food/fluid/supplement intake    Friction and Shear Interventions: Minimize layers, Apply protective barrier, creams and emollients                Problem: Hypertension  Goal: *Blood pressure within specified parameters  Outcome: Progressing Towards Goal  Goal: *Fluid volume balance  Outcome: Progressing Towards Goal  Goal: *Labs within defined limits  Outcome: Progressing Towards Goal     Problem: Patient Education: Go to Patient Education Activity  Goal: Patient/Family Education  Outcome: Progressing Towards Goal Problem: Patient Education: Go to Patient Education Activity  Goal: Patient/Family Education  Outcome: Progressing Towards Goal

## 2021-12-04 NOTE — PROGRESS NOTES
Reason for Admission: Weakness, Frequent Falls                     RUR Score: 16%                 PCP: First and Last name:   Val Castleman, MD   Name of Practice:    Are you a current patient: Yes/No: Yes   Approximate date of last visit: 1 week ago    Can you participate in a virtual visit if needed: Yes    Do you (patient/family) have any concerns for transition/discharge? The patient resides in a duplex apt by herself. She has never been  and has no children. She has 2 sisters that live locally that are her primary supports. Her neighbor in her duplex, Jessica Hartman, is also very supportive. Plan for utilizing home health: The patient has been recommended for SNF placement and she is agreeable to SNF placement upon d/c      Current Advanced Directive/Advance Care Plan:  Full Code    Healthcare Decision Maker: Bradley Corral, Sister, Phone: [phone]    Transition of Care Plan:   CM met with the patient at bedside to discuss dispo plan. The patients resides in a duplex by herself. She has 5 steps to enter the duplex. She has never been  and has no children. She has 2 sisters that live locally that are supportive. Her neighbor, Jessica Hartman, is also supportive. The patient reported that she is able to bathe, dress, toilet, and feed herself independently. She has grab bars, a raised toilet seat, and a shower chair in her bathroom. She uses a dolomite walker when ambulating inside her home. She stopped driving about 1 month ago and her sister or her friend, Jessica Hartman, assist with transportation needs. She uses the [employer] [facility] for her medications. She currently is open to [facility] services with [facility]. She has never been to a SNF/IPR facility in the past.     CM discussed with the patient that PT/OT are recommending SNF placement. The patient is agreeable and she would like to remain in the [place] area for SNF placement.  CM has sent the patient's referral to [facility] and [facility] [place] for review. Care Management Interventions  PCP Verified by CM:  Yes (The patient last saw her PCP 1 week ago )  Mode of Transport at Discharge: BLS  Transition of Care Consult (CM Consult): Discharge Planning  MyChart Signup: No  Discharge Durable Medical Equipment: No  Physical Therapy Consult: Yes  Occupational Therapy Consult: Yes  Speech Therapy Consult: No  Support Systems: Other Family Member(s)  Confirm Follow Up Transport: Family  The Patient and/or Patient Representative was Provided with a Choice of Provider and Agrees with the Discharge Plan?: Yes  Name of the Patient Representative Who was Provided with a Choice of Provider and Agrees with the Discharge Plan: Marlena Lawrence  Freedom of Choice List was Provided with Basic Dialogue that Supports the Patient's Individualized Plan of Care/Goals, Treatment Preferences and Shares the Quality Data Associated with the Providers?: Yes  The [employer] Information Provided?: No  Discharge Location  Discharge Placement: Skilled nursing facility    Mg [employer]

## 2021-12-04 NOTE — PROGRESS NOTES
Problem: Falls - Risk of  Goal: *Absence of Falls  Description: Document Starleen Freeze Fall Risk and appropriate interventions in the flowsheet. Outcome: Progressing Towards Goal  Note: Fall Risk Interventions:  Mobility Interventions: Bed/chair exit alarm, Communicate number of staff needed for ambulation/transfer, Patient to call before getting OOB         Medication Interventions: Bed/chair exit alarm, Patient to call before getting OOB, Teach patient to arise slowly    Elimination Interventions: Bed/chair exit alarm, Call light in reach, Patient to call for help with toileting needs, Stay With Me (per policy), Toilet paper/wipes in reach, Toileting schedule/hourly rounds    History of Falls Interventions: Bed/chair exit alarm, Door open when patient unattended, Investigate reason for fall         Problem: Patient Education: Go to Patient Education Activity  Goal: Patient/Family Education  Outcome: Progressing Towards Goal     Problem: Pressure Injury - Risk of  Goal: *Prevention of pressure injury  Description: Document Mark Scale and appropriate interventions in the flowsheet. Outcome: Progressing Towards Goal  Note: Pressure Injury Interventions:  Sensory Interventions: Assess changes in LOC, Assess need for specialty bed, Avoid rigorous massage over bony prominences, Check visual cues for pain, Float heels, Keep linens dry and wrinkle-free, Maintain/enhance activity level, Minimize linen layers, Monitor skin under medical devices, Turn and reposition approx.  every two hours (pillows and wedges if needed)    Moisture Interventions: Absorbent underpads, Apply protective barrier, creams and emollients, Assess need for specialty bed, Check for incontinence Q2 hours and as needed, Internal/External urinary devices, Limit adult briefs, Minimize layers, Moisture barrier    Activity Interventions: Assess need for specialty bed, Increase time out of bed    Mobility Interventions: Assess need for specialty bed, Float heels, HOB 30 degrees or less    Nutrition Interventions: Document food/fluid/supplement intake    Friction and Shear Interventions: Feet elevated on foot rest, Apply protective barrier, creams and emollients, HOB 30 degrees or less, Lift sheet, Minimize layers                Problem: Patient Education: Go to Patient Education Activity  Goal: Patient/Family Education  Outcome: Progressing Towards Goal     Problem: Hypertension  Goal: *Blood pressure within specified parameters  Outcome: Progressing Towards Goal  Goal: *Fluid volume balance  Outcome: Progressing Towards Goal  Goal: *Labs within defined limits  Outcome: Progressing Towards Goal     Problem: Patient Education: Go to Patient Education Activity  Goal: Patient/Family Education  Outcome: Progressing Towards Goal     Problem: Patient Education: Go to Patient Education Activity  Goal: Patient/Family Education  Outcome: Progressing Towards Goal

## 2021-12-04 NOTE — PROGRESS NOTES
Hospitalist Progress Note    NAME: Lori Aldridge   :  1950   MRN:  [id number]       Assessment / Plan:  Orthostatic Hypotension POA  Generalized weakness (2021)  Recurrent falls (2021)  Adult failure to thrive (2021)  Progressive weakness and falls over months  Apparently increased PTA with multiple falls       Bruising and abrasions  Admitted with failure to thrive, awaiting placement         Consult CM, no note since admit  Head CT ( multiple over past 2 months)       No acute intracranial abnormality is confirmed. Microvascular ischemic and age-related change mild and stable. C-spine CT        No acute abnormality. Degenerative changes at C4-5 through C6-7, mold central canal stenosis  CT Chest/abdomen/pelvis      No acute fracture or other posttraumatic abnormality      No ASD     Mild right hemicolon wall thickening. ?nonspecific infection or inflammation.     Stable cirrhotic liver morphology with a small amount of ascites.     Cholelithiasis.   Echo LVEF 55-60%, normal RV size and function, mild MR, mild pulm HTN  Normal TSH  Check B12  Orthostatics this AM  Laying          110/60, 64                                     Standing       94/66  Reduce BP meds lisinopril 20 mg to 10 mg and hold today, hold HCTZ  Really sounds like some of this is orthostatic  Not clear whar BP meds she is taking  CM consult for placement    Cirrhosis POA   Abnormal LFT POA AST  Cholelithiasis POA   Seen by Rian Rojo and [facility] since summer 2021  Cirrhotic liver appearance on CT scan  Liver biopsy          Fibrosis: Severe, numerous bridges and septae, score 3          Steatosis: Minimal          No stainable iron  Hep C PCR negative, assuming other serologies sent  Ammonia minimally elevated    Anemia of chronic disease and iron deficiency POA HgB 9.8  Iron studies in Oct 2021 iron 56, TIBC 508, iron sat 11%, ferritin 23  Refer to GI about colonoscopy    Chronic thrombocytopenia POA PLTs 74,000 to 91,000  Suspect related to cirrhosis  Stable  Check hemolysis labs, but less likely    Essential HTN (hypertension) POA  Continue home antihypertensive medications  Monitor     Acquired hypothyroidism   TSH within normal limit, mildly elevated free T4  Decrease her Synthroid dose from 112 to 100, recheck TSH within 4 to 6 weeks      Hyperlipidemia   Continue Lipitor  Lipid profile within normal limit    Overweight POA Body mass index is 26.78 kg/m². Code status: Full  Prophylaxis: Lovenox  Recommended Disposition: TBD     Subjective:     Chief Complaint / Reason for Physician Visit   Follow-up weakness, no acute issues  \"I feel fine\"  No complaints, denies Pain or SOB  Does get lightheaded at times when standing at home, feels like she is swaying  BP dropped to 90s standing, cutting back on BP meds  Lives alone  Discussed with RN events overnight. Review of Systems:  Symptom Y/N Comments  Symptom Y/N Comments   Fever/Chills n   Chest Pain n    Poor Appetite    Edema     Cough n   Abdominal Pain n    Sputum    Joint Pain n    SOB/CHAN n   Pruritis/Rash     Nausea/vomit n   Tolerating PT/OT     Diarrhea n   Tolerating Diet y    Constipation    Other       Could NOT obtain due to:      Objective:     VITALS:   Last 24hrs VS reviewed since prior progress note.  Most recent are:  Patient Vitals for the past 24 hrs:   Temp Pulse Resp BP SpO2   12/04/21 0736 97.5 °F (36.4 °C) 74 18 (!) 92/59 95 %   12/04/21 0617 -- -- -- -- 97 %   12/04/21 0320 98.1 °F (36.7 °C) 67 18 126/71 98 %   12/04/21 0010 -- -- -- 94/66 --   12/04/21 0004 -- -- -- 126/73 --   12/04/21 0002 -- -- -- 110/60 --   12/04/21 0000 -- 64 -- -- --   12/03/21 2248 98 °F (36.7 °C) 65 19 (!) 96/59 93 %   12/03/21 2146 -- -- -- -- 95 %   12/03/21 1945 97.9 °F (36.6 °C) 67 15 (!) 106/54 96 %   12/03/21 1816 -- 63 -- (!) 93/57 --   12/03/21 1547 97.2 °F (36.2 °C) 64 16 112/66 97 %   12/03/21 1057 97.5 °F (36.4 °C) (!) 56 12 99/63 --   12/03/21 1023 -- 60 -- 110/66 --       Intake/Output Summary (Last 24 hours) at 12/4/2021 1522  Last data filed at 12/4/2021 7849  Gross per 24 hour   Intake 120 ml   Output 300 ml   Net -180 ml        I had a face to face encounter and independently examined this patient on 12/4/2021, as outlined below:  PHYSICAL EXAM:  General: WD, WN. Alert, cooperative, no acute distress    EENT:  Anicteric sclerae. MMM  Resp:  CTA bilaterally, no wheezing or rales. No accessory muscle use  CV:  Regular  rhythm,  No edema  GI:  Soft, Non distended, Non tender. +Bowel sounds  Neurologic:  Alert and oriented X 3, normal speech, 5/5 motor strength bilaterally    No Cogwheeling  Psych:   Good insight. Not anxious nor agitated  Skin:  No rashes. No jaundice    Reviewed most current lab test results and cultures  YES  Reviewed most current radiology test results   YES  Review and summation of old records today    NO  Reviewed patient's current orders and MAR    YES  PMH/ reviewed - no change compared to H&P  ________________________________________________________________________  Care Plan discussed with:    Comments   Patient x    Family      RN x    Care Manager     Consultant                        Multidiciplinary team rounds were held today with , nursing, pharmacist and clinical coordinator. Patient's plan of care was discussed; medications were reviewed and discharge planning was addressed. ________________________________________________________________________        Comments   >50% of visit spent in counseling and coordination of care     ________________________________________________________________________  Bennett Burgos MD     Procedures: see electronic medical records for all procedures/Xrays and details which were not copied into this note but were reviewed prior to creation of Plan.       LABS:  I reviewed today's most current labs and imaging studies.   Pertinent labs include:  Recent Labs     12/04/21 0054 12/03/21 0054 12/02/21 0354   WBC 4.2 3.5* 4.3   HGB 9.8* 9.3* 10.1*   HCT 30.5* 28.6* 30.3*   PLT 84* 74* 91*     Recent Labs     12/04/21 0054 12/03/21 0054 12/02/21 0354    137 136   K 3.8 3.4* 3.7   * 105 102   CO2 24 24 24   GLU 82 88 68   BUN 13 14 15   CREA 0.72 0.72 0.69   CA 8.2* 8.4* 8.5   MG  --  1.9 2.1   ALB 2.1* 2.2* 2.5*   TBILI 1.3* 1.3* 1.8*   ALT 89* 95* 99*       Signed: Lane Skaggs MD

## 2021-12-04 NOTE — PROGRESS NOTES
End of Shift Note    Bedside shift change report given to [address] (oncoming nurse) by Selma Rehman RN (offgoing nurse). Report included the following information SBAR    Shift worked:  Days     Shift summary and any significant changes:     Patient was cooperative today and rested quietly. PT/OT helped her to the chair for a few hours. Concerns for physician to address:       Zone phone for oncoming shift:          Activity:  Activity Level: Up with Assistance  Number times ambulated in hallways past shift: 0  Number of times OOB to chair past shift: 1    Cardiac:   Cardiac Monitoring: Yes      Cardiac Rhythm: Sinus Rhythm    Access:   Current line(s): PIV     Genitourinary:   Urinary status: external catheter    Respiratory:   O2 Device: None (Room air)  Chronic home O2 use?: NO  Incentive spirometer at bedside: NO     GI:  Last Bowel Movement Date: 12/03/21  Current diet:  ADULT ORAL NUTRITION SUPPLEMENT Breakfast; Standard High Calorie/High Protein  ADULT DIET Regular; Low Fat/Low Chol/High Fiber/WILLIS; Chocolate flavor Ensure Enlive every day with breakfast meal  Passing flatus: YES  Tolerating current diet: YES       Pain Management:   Patient states pain is manageable on current regimen: YES    Skin:  Mark Score: 17  Interventions: internal/external urinary devices    Patient Safety:  Fall Score:  Total Score: 4  Interventions: pt to call before getting OOB  High Fall Risk: Yes    Length of Stay:  Expected LOS: 2d 14h  Actual LOS: 2      Selma Rehman RN

## 2021-12-04 NOTE — PROGRESS NOTES
End of Shift Note    Bedside shift change report given to Michelle Bower RN (oncoming nurse) by Filipe Bailon RN (offgoing nurse). Report included the following information SBAR, Kardex, ED Summary, Intake/Output, MAR, Recent Results, Med Rec Status and Cardiac Rhythm Sinus Rhythm    Shift worked:  7p-7a     Shift summary and any significant changes:     continues with NS running at 100mL/hr with good output, very unstable on her feet, orthostatic BP done, became confused throughout the night and trying to get out of bed     Concerns for physician to address:       Zone phone for oncoming shift:          Activity:  Activity Level: Up with Assistance  Number times ambulated in hallways past shift: 0  Number of times OOB to chair past shift: 0    Cardiac:   Cardiac Monitoring: Yes      Cardiac Rhythm: Sinus Rhythm    Access:   Current line(s): PIV     Genitourinary:   Urinary status: voiding, incontinent and external catheter    Respiratory:   O2 Device: None (Room air)  Chronic home O2 use?: NO  Incentive spirometer at bedside: NO     GI:  Last Bowel Movement Date: 12/03/21  Current diet:  ADULT ORAL NUTRITION SUPPLEMENT Breakfast; Standard High Calorie/High Protein  ADULT DIET Regular; Low Fat/Low Chol/High Fiber/WILLIS; Chocolate flavor Ensure Enlive every day with breakfast meal  Passing flatus: YES  Tolerating current diet: YES       Pain Management:   Patient states pain is manageable on current regimen: YES    Skin:  Mark Score: 17  Interventions: float heels, increase time out of bed, limit briefs and internal/external urinary devices    Patient Safety:  Fall Score:  Total Score: 4  Interventions: bed/chair alarm, assistive device (walker, cane, etc), gripper socks, pt to call before getting OOB and stay with me (per policy)  High Fall Risk: Yes    Length of Stay:  Expected LOS: 2d 14h  Actual LOS: [address]

## 2021-12-05 NOTE — PROGRESS NOTES
End of Shift Note    Bedside shift change report given to  Jenny CHEN  (oncoming nurse) by Radha Capone (offgoing nurse). Report included the following information SBAR and Kardex    Shift worked:  1821-2175     Shift summary and any significant changes:    Patient had increased confusion overnight, attempting to get out of bed 3 times. Concerns for physician to address: See above   Zone phone for oncoming shift:          Activity:  Activity Level: Up with Assistance  Number times ambulated in hallways past shift: 0  Number of times OOB to chair past shift: 0    Cardiac:   Cardiac Monitoring: Yes      Cardiac Rhythm: Sinus Rhythm    Access:   Current line(s): PIV     Genitourinary:   Urinary status: voiding    Respiratory:   O2 Device: None (Room air)  Chronic home O2 use?: YES  Incentive spirometer at bedside: NO     GI:  Last Bowel Movement Date: 12/04/21  Current diet:  ADULT ORAL NUTRITION SUPPLEMENT Breakfast; Standard High Calorie/High Protein  ADULT DIET Regular; Low Fat/Low Chol/High Fiber/WILLIS; Chocolate flavor Ensure Enlive every day with breakfast meal  Passing flatus: YES  Tolerating current diet: YES       Pain Management:   Patient states pain is manageable on current regimen: YES    Skin:  Mark Score: 17  Interventions: increase time out of bed    Patient Safety:  Fall Score:  Total Score: 5  Interventions: pt to call before getting OOB  High Fall Risk: Yes    Length of Stay:  Expected LOS: 2d 14h  Actual LOS: [address]

## 2021-12-05 NOTE — PROGRESS NOTES
Problem: Falls - Risk of  Goal: *Absence of Falls  Description: Document [facility] Rodriguez Fall Risk and appropriate interventions in the flowsheet. Outcome: Progressing Towards Goal  Note: Fall Risk Interventions:  Mobility Interventions: Assess mobility with egress test, Bed/chair exit alarm    Mentation Interventions: Adequate sleep, hydration, pain control, Bed/chair exit alarm, Door open when patient unattended    Medication Interventions: Assess postural VS orthostatic hypotension, Bed/chair exit alarm, Patient to call before getting OOB, Teach patient to arise slowly    Elimination Interventions: Bed/chair exit alarm, Call light in reach, Patient to call for help with toileting needs, Toilet paper/wipes in reach, Toileting schedule/hourly rounds    History of Falls Interventions: Bed/chair exit alarm, Consult care management for discharge planning, Door open when patient unattended, Investigate reason for fall         Problem: Patient Education: Go to Patient Education Activity  Goal: Patient/Family Education  Outcome: Progressing Towards Goal     Problem: Pressure Injury - Risk of  Goal: *Prevention of pressure injury  Description: Document Mark Scale and appropriate interventions in the flowsheet.   Outcome: Progressing Towards Goal  Note: Pressure Injury Interventions:  Sensory Interventions: Assess need for specialty bed, Assess changes in LOC    Moisture Interventions: Absorbent underpads, Apply protective barrier, creams and emollients, Check for incontinence Q2 hours and as needed, Minimize layers    Activity Interventions: Assess need for specialty bed, Increase time out of bed, Pressure redistribution bed/mattress(bed type), PT/OT evaluation    Mobility Interventions: Assess need for specialty bed, HOB 30 degrees or less, Pressure redistribution bed/mattress (bed type), PT/OT evaluation    Nutrition Interventions: Document food/fluid/supplement intake    Friction and Shear Interventions: Apply protective barrier, creams and emollients, HOB 30 degrees or less, Lift sheet, Minimize layers                Problem: Patient Education: Go to Patient Education Activity  Goal: Patient/Family Education  Outcome: Progressing Towards Goal     Problem: Hypertension  Goal: *Blood pressure within specified parameters  Outcome: Progressing Towards Goal  Goal: *Fluid volume balance  Outcome: Progressing Towards Goal  Goal: *Labs within defined limits  Outcome: Progressing Towards Goal     Problem: Patient Education: Go to Patient Education Activity  Goal: Patient/Family Education  Outcome: Progressing Towards Goal     Problem: Patient Education: Go to Patient Education Activity  Goal: Patient/Family Education  Outcome: Progressing Towards Goal

## 2021-12-05 NOTE — PROGRESS NOTES
Problem: Falls - Risk of  Goal: *Absence of Falls  Description: Document [facility] Fall Risk and appropriate interventions in the flowsheet.   12/4/2021 2011 by Navi Falcon RN  Outcome: Progressing Towards Goal  Note: Fall Risk Interventions:  Mobility Interventions: Bed/chair exit alarm, Patient to call before getting OOB, Utilize walker, cane, or other assistive device    Mentation Interventions: Adequate sleep, hydration, pain control, Bed/chair exit alarm, Eyeglasses and hearing aids, More frequent rounding, Room close to nurse's station, Door open when patient unattended, Toileting rounds, Update white board    Medication Interventions: Bed/chair exit alarm, Assess postural VS orthostatic hypotension, Patient to call before getting OOB, Teach patient to arise slowly    Elimination Interventions: Bed/chair exit alarm, Call light in reach, Patient to call for help with toileting needs, Toileting schedule/hourly rounds    History of Falls Interventions: Bed/chair exit alarm, Door open when patient unattended      12/4/2021 2011 by Navi Falcon RN  Outcome: Progressing Towards Goal  Note: Fall Risk Interventions:  Mobility Interventions: Bed/chair exit alarm, Patient to call before getting OOB, Utilize walker, cane, or other assistive device    Mentation Interventions: Adequate sleep, hydration, pain control, Bed/chair exit alarm, Eyeglasses and hearing aids, More frequent rounding, Room close to nurse's station, Door open when patient unattended, Toileting rounds, Update white board    Medication Interventions: Bed/chair exit alarm, Assess postural VS orthostatic hypotension, Patient to call before getting OOB, Teach patient to arise slowly    Elimination Interventions: Bed/chair exit alarm, Call light in reach, Patient to call for help with toileting needs, Toileting schedule/hourly rounds    History of Falls Interventions: Bed/chair exit alarm, Door open when patient unattended         Problem: Patient Education: Go to Patient Education Activity  Goal: Patient/Family Education  Outcome: Progressing Towards Goal     Problem: Pressure Injury - Risk of  Goal: *Prevention of pressure injury  Description: Document Mark Scale and appropriate interventions in the flowsheet.   12/4/2021 2011 by John Melchor RN  Outcome: Progressing Towards Goal  Note: Pressure Injury Interventions:  Sensory Interventions: Assess changes in LOC, Keep linens dry and wrinkle-free, Minimize linen layers    Moisture Interventions: Absorbent underpads, Internal/External urinary devices, Minimize layers    Activity Interventions: Assess need for specialty bed, Increase time out of bed, PT/OT evaluation    Mobility Interventions: Assess need for specialty bed, HOB 30 degrees or less, PT/OT evaluation    Nutrition Interventions: Document food/fluid/supplement intake    Friction and Shear Interventions: Apply protective barrier, creams and emollients, Minimize layers, HOB 30 degrees or less             12/4/2021 2011 by John Melchor RN  Outcome: Progressing Towards Goal  Note: Pressure Injury Interventions:  Sensory Interventions: Assess changes in LOC, Keep linens dry and wrinkle-free, Minimize linen layers    Moisture Interventions: Absorbent underpads, Internal/External urinary devices, Minimize layers    Activity Interventions: Assess need for specialty bed, Increase time out of bed, PT/OT evaluation    Mobility Interventions: Assess need for specialty bed, HOB 30 degrees or less, PT/OT evaluation    Nutrition Interventions: Document food/fluid/supplement intake    Friction and Shear Interventions: Apply protective barrier, creams and emollients, Minimize layers, HOB 30 degrees or less                Problem: Patient Education: Go to Patient Education Activity  Goal: Patient/Family Education  Outcome: Progressing Towards Goal     Problem: Hypertension  Goal: *Blood pressure within specified parameters  12/4/2021 2011 by John Melchor RN  Outcome: Progressing Towards Goal  12/4/2021 2011 by John Melchor RN  Outcome: Progressing Towards Goal  Goal: *Fluid volume balance  12/4/2021 2011 by John Melchor RN  Outcome: Progressing Towards Goal  12/4/2021 2011 by John Melchor RN  Outcome: Progressing Towards Goal  Goal: *Labs within defined limits  12/4/2021 2011 by John Melchor RN  Outcome: Progressing Towards Goal  12/4/2021 2011 by John Melchor RN  Outcome: Progressing Towards Goal     Problem: Patient Education: Go to Patient Education Activity  Goal: Patient/Family Education  12/4/2021 2011 by John Melchor RN  Outcome: Progressing Towards Goal  12/4/2021 2011 by John Melchor RN  Outcome: Progressing Towards Goal     Problem: Patient Education: Go to Patient Education Activity  Goal: Patient/Family Education  12/4/2021 2011 by John Melchor RN  Outcome: Progressing Towards Goal  12/4/2021 2011 by John Melchor RN  Outcome: Progressing Towards Goal

## 2021-12-05 NOTE — PROGRESS NOTES
12/05/21 1106   Vitals   Temp 97.8 °F (36.6 °C)   Temp Source Oral   Pulse (Heart Rate) 75   Heart Rate Source Monitor   Resp Rate 18   O2 Sat (%) 96 %   Level of Consciousness Alert (0)   /75   MAP (Calculated) 92   BP 1 Location Left upper arm   BP 1 Method Automatic   BP Patient Position Lying   MEWS Score 1   Additional Blood Pressure/Pulse Data   Pulse 2 80   BP 2 120/73   MAP 2 (Calculated) 89   BP 2 Location Left arm   BP Method 2 Automatic   Patient Position 2 Sitting   Pulse 3 87   BP 3 102/64   MAP 3 (Calculated) 77   BP 3 Location Left arm   BP Method 3 Automatic   Patient Position 3 Standing     Orthostatic BP

## 2021-12-05 NOTE — PROGRESS NOTES
0700: Bedside shift change report given to GUSTAVO Alvarado (oncoming nurse) by Alec Hernandez RN (offgoing nurse). Report included the following information SBAR, Kardex, ED Summary and Intake/Output. 1106: Orthostatic BP for shift       12/05/21 1106   Vitals   Temp 97.8 °F (36.6 °C)   Temp Source Oral   Pulse (Heart Rate) 75   Heart Rate Source Monitor   Resp Rate 18   O2 Sat (%) 96 %   Level of Consciousness Alert (0)   /75   MAP (Calculated) 92   BP 1 Location Left upper arm   BP 1 Method Automatic   BP Patient Position Lying   MEWS Score 1   Additional Blood Pressure/Pulse Data   Pulse 2 80   BP 2 120/73   MAP 2 (Calculated) 89   BP 2 Location Left arm   BP Method 2 Automatic   Patient Position 2 Sitting   Pulse 3 87   BP 3 102/64   MAP 3 (Calculated) 77   BP 3 Location Left arm   BP Method 3 Automatic   Patient Position 3 Standing     1900:   End of Shift Note    Bedside shift change report given to GUSTAVO Bone (oncoming nurse) by Josephine Banda RN (offgoing nurse). Report included the following information SBAR, Kardex, ED Summary and Intake/Output    Shift worked:  Day     Shift summary and any significant changes:     See above notes     Concerns for physician to address:       Zone phone for oncoming shift:          Activity:  Activity Level:  Up with Assistance  Number times ambulated in hallways past shift: 0  Number of times OOB to chair past shift: 3    Cardiac:   Cardiac Monitoring: Yes      Cardiac Rhythm: Sinus Rhythm    Access:   Current line(s): PIV     Genitourinary:   Urinary status: voiding    Respiratory:   O2 Device: None (Room air)  Chronic home O2 use?: N/A  Incentive spirometer at bedside: N/A     GI:  Last Bowel Movement Date: 12/05/21  Current diet:  ADULT ORAL NUTRITION SUPPLEMENT Breakfast; Standard High Calorie/High Protein  ADULT DIET Regular; Low Fat/Low Chol/High Fiber/WILLIS; Chocolate flavor Ensure Enlive every day with breakfast meal  Passing flatus: YES  Tolerating current diet: YES       Pain Management:   Patient states pain is manageable on current regimen: N/A    Skin:  Mark Score: 17  Interventions: increase time out of bed and PT/OT consult    Patient Safety:  Fall Score:  Total Score: 5  Interventions: bed/chair alarm, assistive device (walker, cane, etc), gripper socks and pt to call before getting OOB  High Fall Risk: Yes    Length of Stay:  Expected LOS: 2d 14h  Actual LOS: [address] RN

## 2021-12-05 NOTE — PROGRESS NOTES
Hospitalist Progress Note    NAME: China Gallo   :  1950   MRN:  [id number]       Assessment / Plan:    Vit B12 deficiency POA B-12 of 174  Unclear how much of the falls,weakness are related to this  Supplement with shots and follow over time    Orthostatic Hypotension POA  Generalized weakness (2021)  Recurrent falls (2021)  Adult failure to thrive (2021)  Progressive weakness and falls over months  Apparently increased PTA with multiple falls       Bruising and abrasions  Admitted with failure to thrive, awaiting placement         Consult CM, no note since admit  Head CT ( multiple over past 2 months)       No acute intracranial abnormality is confirmed. Microvascular ischemic and age-related change mild and stable. C-spine CT        No acute abnormality. Degenerative changes at C4-5 through C6-7, mold central canal stenosis  CT Chest/abdomen/pelvis      No acute fracture or other posttraumatic abnormality      No ASD     Mild right hemicolon wall thickening. ?nonspecific infection or inflammation.     Stable cirrhotic liver morphology with a small amount of ascites.     Cholelithiasis.   Echo LVEF 55-60%, normal RV size and function, mild MR, mild pulm HTN  Normal TSH  Low B12, see above  Orthostatics this AM  Laying          110/60, 64                                     Standing       94/66  Hold all BP meds for now  Really sounds like some of this is orthostatic  CM consult for placement, ready for discharge  Cortisol 8.1    Cirrhosis POA   Abnormal LFT POA AST  Cholelithiasis POA   Seen by [facility] and [facility] since summer 2021  Cirrhotic liver appearance on CT scan  Liver biopsy          Fibrosis: Severe, numerous bridges and septae, score 3          Steatosis: Minimal          No stainable iron  Hep C PCR negative, assuming other serologies sent  Ammonia minimally elevated    Anemia of chronic disease and iron deficiency POA HgB 9.8  Iron studies in Oct 2021 iron 56, TIBC 508, iron sat 11%, ferritin 23  Refer to GI about colonoscopy, her last one was over 10 years ago she thinks    Chronic thrombocytopenia POA PLTs 74,000 to 91,000  Suspect related to cirrhosis  Stable  Check hemolysis labs, but less likely    Essential HTN (hypertension) POA  BP running low, hold home BP meds  Monitor     Acquired hypothyroidism   TSH within normal limit, mildly elevated free T4  Decrease her Synthroid dose from 112 to 100, recheck TSH within 4 to 6 weeks      Hyperlipidemia   Continue Lipitor  Lipid profile within normal limit    Overweight POA Body mass index is 26.78 kg/m². Code status: Full  Prophylaxis: Lovenox  Recommended Disposition: TBD     Subjective:     Chief Complaint / Reason for Physician Visit   Follow-up weakness, no acute issues  \"no problems\"  No complaints, denies Pain or SOB  Off all BP meds  Low B12 levels  Discussed with RN events overnight. Review of Systems:  Symptom Y/N Comments  Symptom Y/N Comments   Fever/Chills n   Chest Pain n    Poor Appetite    Edema     Cough n   Abdominal Pain n    Sputum    Joint Pain n    SOB/CHAN n   Pruritis/Rash     Nausea/vomit n   Tolerating PT/OT     Diarrhea n   Tolerating Diet y    Constipation    Other       Could NOT obtain due to:      Objective:     VITALS:   Last 24hrs VS reviewed since prior progress note.  Most recent are:  Patient Vitals for the past 24 hrs:   Temp Pulse Resp BP SpO2   12/05/21 0802 97.4 °F (36.3 °C) 70 19 107/79 94 %   12/05/21 0724 -- -- -- -- 97 %   12/05/21 0238 97.7 °F (36.5 °C) 69 19 118/66 95 %   12/04/21 2233 97.5 °F (36.4 °C) 73 21 115/75 94 %   12/04/21 1916 97.4 °F (36.3 °C) 72 20 (!) 106/58 94 %   12/04/21 1752 98.3 °F (36.8 °C) 69 16 102/62 96 %   12/04/21 1727 -- -- -- -- 97 %   12/04/21 1118 97.4 °F (36.3 °C) 66 14 114/61 99 %       Intake/Output Summary (Last 24 hours) at 12/5/2021 1030  Last data filed at 12/4/2021 1752  Gross per 24 hour   Intake --   Output 350 ml   Net -350 ml        I had a face to face encounter and independently examined this patient on 12/5/2021, as outlined below:  PHYSICAL EXAM:  General: WD, WN. Alert, cooperative, no acute distress    EENT:  Anicteric sclerae. MMM  Resp:  CTA bilaterally, no wheezing or rales. No accessory muscle use  CV:  Regular  rhythm,  No edema  GI:  Soft, Non distended, Non tender. +Bowel sounds  Neurologic:  Alert and oriented X 3, normal speech, 5/5 motor strength bilaterally    No Cogwheeling  Psych:   Good insight. Not anxious nor agitated  Skin:  No rashes. No jaundice, extensive bruises on back    Reviewed most current lab test results and cultures  YES  Reviewed most current radiology test results   YES  Review and summation of old records today    NO  Reviewed patient's current orders and MAR    YES  PMH/ reviewed - no change compared to H&P  ________________________________________________________________________  Care Plan discussed with:    Comments   Patient x    Family      RN x    Care Manager     Consultant                        Multidiciplinary team rounds were held today with , nursing, pharmacist and clinical coordinator. Patient's plan of care was discussed; medications were reviewed and discharge planning was addressed. ________________________________________________________________________        Comments   >50% of visit spent in counseling and coordination of care     ________________________________________________________________________  Mikayla Watson MD     Procedures: see electronic medical records for all procedures/Xrays and details which were not copied into this note but were reviewed prior to creation of Plan. LABS:  I reviewed today's most current labs and imaging studies.   Pertinent labs include:  Recent Labs     12/05/21  0236 12/04/21  0054 12/03/21  0054   WBC 4.3 4.2 3.5*   HGB 10.1* 9.8* 9.3*   HCT 31.4* 30.5* 28.6*   PLT 79* 84* 74* Recent Labs     12/05/21  0236 12/04/21  0054 12/03/21  0054    137 137   K 3.6 3.8 3.4*   * 109* 105   CO2 23 24 24   GLU 89 82 88   BUN 12 13 14   CREA 0.70 0.72 0.72   CA 8.4* 8.2* 8.4*   MG  --   --  1.9   ALB 2.1* 2.1* 2.2*   TBILI 1.6*  1.3* 1.3* 1.3*   ALT 90* 89* 95*       Signed: Percy Celeste MD

## 2021-12-05 NOTE — PROGRESS NOTES
End of Shift Note    Bedside shift change report given to Yumiko Gibson RN (oncoming nurse) by Casandra Cho RN (offgoing nurse). Report included the following information SBAR    Shift worked:  Days     Shift summary and any significant changes:     Patient show some mild confusion occasionally. Able to get to bedside commode with stand and pivot 1 assist.  BPs were low today and metoprolol held. Concerns for physician to address:       Zone phone for oncoming shift:          Activity:  Activity Level: Up with Assistance  Number times ambulated in hallways past shift: 0  Number of times OOB to chair past shift: 0    Cardiac:   Cardiac Monitoring: Yes      Cardiac Rhythm: Sinus Rhythm    Access:   Current line(s): PIV     Genitourinary:   Urinary status: voiding and external catheter    Respiratory:   O2 Device: None (Room air)  Chronic home O2 use?: NO  Incentive spirometer at bedside: NO     GI:  Last Bowel Movement Date: 12/04/21  Current diet:  ADULT ORAL NUTRITION SUPPLEMENT Breakfast; Standard High Calorie/High Protein  ADULT DIET Regular; Low Fat/Low Chol/High Fiber/WILLIS; Chocolate flavor Ensure Enlive every day with breakfast meal  Passing flatus: YES  Tolerating current diet: YES       Pain Management:   Patient states pain is manageable on current regimen: YES    Skin:  Mark Score: 17  Interventions: internal/external urinary devices    Patient Safety:  Fall Score:  Total Score: 5  Interventions: bed/chair alarm  High Fall Risk: Yes    Length of Stay:  Expected LOS: 2d 14h  Actual LOS: [address] [place], [place]

## 2021-12-06 NOTE — DISCHARGE INSTRUCTIONS
HOSPITALIST DISCHARGE INSTRUCTIONS    NAME: Ty Castaneda   :  1950   MRN:  [id number]     Date/Time:  2021     ADMIT DATE: 2021     DISCHARGE DATE: 2021     Attending Physician: Kamran Diaz MD    DISCHARGE DIAGNOSIS:    Vit B 12 deficiency    Frequent falls    Hypotension all BP medications      Medications: Per above medication reconciliation. Pain Management: per above medications    Recommended diet: Cardiac Diet    Recommended activity: Activity as tolerated    Wound care: None    Indwelling devices:  None    Supplemental Oxygen: None    Required Lab work: Per SNF routine    Glucose management:  None    Code status: Full    B12 shots 1000 mcg IM every Wednesday starting 2021 for 4 shots then months till stopped by PCP    Holding BP meds metoprolol    Outside physician follow up: Follow-up Information     Follow up With Specialties Details Why Contact Info    Lorrayne Dakins, MD Family Medicine Schedule an appointment as soon as possible for a visit in 1 week after discharge from rehab [address]  [address]  [facility] NP Hepatology Schedule an appointment as soon as possible for a visit in 7 weeks  [address] [facility]  [phone]                 Skilled nursing facility/ SNF MD responsible for above on discharge. Information obtained by :  I understand that if any problems occur once I am at home I am to contact my physician. I understand and acknowledge receipt of the instructions indicated above.                                                                                                                                            Physician's or R.N.'s Signature                                                                  Date/Time Patient or Repres

## 2021-12-06 NOTE — PROGRESS NOTES
Transition of Care Plan:    RUR:16% moderate risk  Disposition:SNF-[facility]   Follow up appointments:PCP and specialists as indicated  DME needed:Pt has a walker, shower chair, grab bars, and raised toilet seat  Transportation at Discharge:[facility] at [address] or means to access home:  pt      IM Medicare Letter:given 12/6/21  Is patient a BCPI-A Bundle: n/a          If yes, was Bundle Letter given?:    Is patient a [place] and connected with the [place]? If yes, was [employer] transfer form completed and VA notified? Caregiver Contact:sister Binu Cross [phone]  Discharge Caregiver contacted prior to discharge? Update 1630:  CM received a return call from pt sister Nelsy Melgar. CM confirmed with her that the [address] [place] [address] had been notified of the pt and her desire to for the pt to transition to LTC and that they can assist her with this transition at the facility. Sister to f/u at [address] [place] [address]. [facility] will now transport at 645pm.  Primary nurse notified. Pt is ready for d/c from a CM standpoint. Update 9664:  CM attempted to call pt sister after obtaining her consent. VM left. Initial note:  Chart reviewed. This pt has been accepted by [address] [place] [address]. CM spoke with Vida from [address] [place] [address] and she does have a bed available. CM met with pt to discuss d/c plan. She is agreeable to d/c. Medicare pt has received, reviewed, and signed 2nd  letter informing them of their right to appeal the discharge. Signed copied has been placed on pt bedside chart. Nurse to call report to  and ask for the [facility]. The pt is going to room 303B. Transport secured for 515pm with [facility]. Paperwork placed on chart. CM to continue to monitor for d/c needs. Transition of Care Plan to SNF/Rehab    SNF/Rehab Transition:  Patient has been accepted to [address] [place] [address] and meets criteria for admission.    Patient will transported by [facility] and expected to leave at 630pm.    Communication to Patient/Family:  Met with patient and spoke with [facility] and they are agreeable to the transition plan. Communication to SNF/Rehab:  Bedside RN, Weston Wayne, has been notified to update the transition plan to the facility and call report to [phone]. Master Isma Discharge information has been updated on the AVS.       Nursing Please include all hard scripts for controlled substances, med rec and dc summary, and AVS in packet. Reviewed and confirmed with facility, [address] [place] [address],[address], can manage the patient care needs for the following:     Reviewed and confirmed with facility, [address] [place] [address],[address] and they can manage the patient care needs for the following:     Carlitos Galeas with (X) only those applicable:    Medication:  [x]  Medications will be available at the facility  []  IV Antibiotics   []  Controlled Substance - hard copy to be sent with patient   []  Weekly Labs   Documents:  [] Hard RX  [x] MAR  [] Kardex  [x] AVS  []Transfer Summary  [x]Discharge   Equipment:  []  CPAP/BiPAP  []  Wound Vacuum  []  Ramos or Urinary Device  []  PICC/Central Line  [x]  Nebulizer  []  Ventilator   Treatment:  []Isolation (for MRSA, VRE, etc.)  []Surgical Drain Management  []Tracheostomy Care  []Dressing Changes  []Dialysis with transportation and chair time . []PEG Care  []Oxygen  []Daily Weights for Heart Failure   Dietary:  []Any diet limitations  []Tube Feedings   []Total Parenteral Management (TPN)   Eligible for Medicaid Long Term Services and Supports  Yes:  [] Eligible for medical assistance or will become eligible within 180 days and UAI completed. [] Provider/Patient and/or support system has requested screening. [] UAI copy provided to patient or responsible party,   [] UAI unavailable at discharge will send once processed to SNF provider. [] UAI unavailable at discharged mailed to patient  No:   [] Private pay and is not financially eligible for Medicaid within the next 180 days. [] Reside out-of-state.   [] A residents of a state owned/operated facility that is licensed  by [place] [address] [employer] and [employer] or [facility]  [] Enrollment in [facility] hospice services  [] Non [place] citizen  [] Patient /Family declines to have screening completed or provide financial information for screening     Financial Resources:  Medicaid    [] Initiated and application pending   [] Full coverage     Advanced Care Plan:  [x]Surrogate Decision Maker of Bunny Haile  []POA  [x]Communicated Code Status-full code    Other                Care Management Interventions  PCP Verified by CM:  Yes  Palliative Care Criteria Met (RRAT>21 & CHF Dx)?: No  Mode of Transport at Discharge: S  Transition of Care Consult (CM Consult): Discharge Planning  MyChart Signup: No  Discharge Durable Medical Equipment: No  Physical Therapy Consult: Yes  Occupational Therapy Consult: Yes  Speech Therapy Consult: No  Support Systems: Other Family Member(s)  Confirm Follow Up Transport: Family  The Patient and/or Patient Representative was Provided with a Choice of Provider and Agrees with the Discharge Plan?: Yes  Name of the Patient Representative Who was Provided with a Choice of Provider and Agrees with the Discharge Plan: [facility]  Freedom of Choice List was Provided with Basic Dialogue that Supports the Patient's Individualized Plan of Care/Goals, Treatment Preferences and Shares the Quality Data Associated with the Providers?: Yes  [place] Resource Information Provided?: No  Discharge Location  Discharge Placement: Skilled nursing facility            Trisha De La Cruz MSN  Care Manager  [phone]

## 2021-12-06 NOTE — PROGRESS NOTES
Hospitalist Progress Note    NAME: Melita Runner   :  1950   MRN:  [id number]       Assessment / Plan:    Vit B12 deficiency POA B-12 of 174  Unclear how much of the falls,weakness are related to this  Supplement with shots and follow over time    Orthostatic Hypotension POA  Generalized weakness (2021)  Recurrent falls (2021)  Adult failure to thrive (2021)  Progressive weakness and falls over months  Apparently increased PTA with multiple falls       Bruising and abrasions  Admitted with failure to thrive, awaiting placement         Consult CM, no note since admit  Head CT ( multiple over past 2 months)       No acute intracranial abnormality is confirmed. Microvascular ischemic and age-related change mild and stable. C-spine CT        No acute abnormality. Degenerative changes at C4-5 through C6-7, mold central canal stenosis  CT Chest/abdomen/pelvis      No acute fracture or other posttraumatic abnormality      No ASD     Mild right hemicolon wall thickening. ?nonspecific infection or inflammation.     Stable cirrhotic liver morphology with a small amount of ascites.     Cholelithiasis.   Echo LVEF 55-60%, normal RV size and function, mild MR, mild pulm HTN  Normal TSH  Low B12, see above  Orthostatics this AM  Laying          110/60, 64                                     Standing       94/66  Holding all BP meds for now  Really sounds like some of this is orthostatic  CM consult for placement, ready for discharge  Cortisol 8.1  Called sister Linda Ramirez and updated her on plan and diagnoses with pts permission    Cirrhosis POA   Abnormal LFT POA AST  Cholelithiasis POA   Seen by [facility] and [facility] since summer [facility]  Cirrhotic liver appearance on CT scan  Liver biopsy          Fibrosis: Severe, numerous bridges and septae, score 3          Steatosis: Minimal          No stainable iron  Hep C PCR negative, assuming other serologies sent  Ammonia minimally elevated  Set up outpatient hepatology follow up    Anemia of chronic disease and iron deficiency POA HgB 9.8  Iron studies in Oct 2021 iron 56, TIBC 508, iron sat 11%, ferritin 23  Refer to GI about colonoscopy, her last one was over 10 years ago she thinks    Chronic thrombocytopenia POA PLTs 74,000 to 91,000  Suspect related to cirrhosis  Stable  Check hemolysis labs, but less likely    Essential HTN (hypertension) POA  BP running low, hold home BP meds  Monitor     Acquired hypothyroidism   TSH within normal limit, mildly elevated free T4  Decrease her Synthroid dose from 112 to 100, recheck TSH within 4 to 6 weeks      Hyperlipidemia   Continue Lipitor  Lipid profile within normal limit    Overweight POA Body mass index is 26.78 kg/m². Code status: Full  Prophylaxis: Lovenox  Recommended Disposition: TBD     Subjective:     Chief Complaint / Reason for Physician Visit   Follow-up weakness, no acute issues  \"no problems\"  No complaints, denies Pain or SOB  BP stable off all meds  Discussed with RN events overnight. Review of Systems:  Symptom Y/N Comments  Symptom Y/N Comments   Fever/Chills n   Chest Pain n    Poor Appetite    Edema     Cough n   Abdominal Pain n    Sputum    Joint Pain n    SOB/CHAN n   Pruritis/Rash     Nausea/vomit n   Tolerating PT/OT     Diarrhea n   Tolerating Diet y    Constipation    Other       Could NOT obtain due to:      Objective:     VITALS:   Last 24hrs VS reviewed since prior progress note.  Most recent are:  Patient Vitals for the past 24 hrs:   Temp Pulse Resp BP SpO2   12/06/21 0811 -- 63 16 101/63 94 %   12/06/21 0724 -- -- -- -- 97 %   12/06/21 0308 97.6 °F (36.4 °C) 66 16 120/68 95 %   12/05/21 2259 97.3 °F (36.3 °C) 71 15 101/60 94 %   12/05/21 2122 -- 76 -- 125/70 --   12/05/21 2004 -- -- -- -- 95 %   12/05/21 1944 97.4 °F (36.3 °C) 77 18 118/75 95 %   12/05/21 1507 98.2 °F (36.8 °C) 69 18 110/75 96 %   12/05/21 1106 97.8 °F (36.6 °C) 75 18 125/75 96 % Intake/Output Summary (Last 24 hours) at 12/6/2021 0931  Last data filed at 12/6/2021 0513  Gross per 24 hour   Intake 360 ml   Output 400 ml   Net -40 ml        I had a face to face encounter and independently examined this patient on 12/6/2021, as outlined below:  PHYSICAL EXAM:  General: WD, WN. Alert, cooperative, no acute distress    EENT:  Anicteric sclerae. MMM  Resp:  CTA bilaterally, no wheezing or rales. No accessory muscle use  CV:  Regular  rhythm,  No edema  GI:  Soft, Non distended, Non tender. +Bowel sounds  Neurologic:  Alert and oriented X 3, normal speech, 5/5 motor strength bilaterally    No Cogwheeling  Psych:   Good insight. Not anxious nor agitated  Skin:  No rashes. No jaundice, extensive bruises on back    Reviewed most current lab test results and cultures  YES  Reviewed most current radiology test results   YES  Review and summation of old records today    NO  Reviewed patient's current orders and MAR    YES  PMH/ reviewed - no change compared to H&P  ________________________________________________________________________  Care Plan discussed with:    Comments   Patient x    Family      RN x    Care Manager     Consultant                        Multidiciplinary team rounds were held today with , nursing, pharmacist and clinical coordinator. Patient's plan of care was discussed; medications were reviewed and discharge planning was addressed. ________________________________________________________________________        Comments   >50% of visit spent in counseling and coordination of care     ________________________________________________________________________  [facility] Bimler, MD     Procedures: see electronic medical records for all procedures/Xrays and details which were not copied into this note but were reviewed prior to creation of Plan. LABS:  I reviewed today's most current labs and imaging studies.   Pertinent labs include:  Recent Labs 12/05/21 0236 12/04/21  0054   WBC 4.3 4.2   HGB 10.1* 9.8*   HCT 31.4* 30.5*   PLT 79* 84*     Recent Labs     12/05/21 0236 12/04/21  0054    137   K 3.6 3.8   * 109*   CO2 23 24   GLU 89 82   BUN 12 13   CREA 0.70 0.72   CA 8.4* 8.2*   ALB 2.1* 2.1*   TBILI 1.6*  1.3* 1.3*   ALT 90* 89*       Signed: Florencio Kahn MD

## 2021-12-06 NOTE — DISCHARGE SUMMARY
Hospitalist Discharge Note    NAME: Douglas Ceron   :  1950   MRN:  [id number]     Admit date: 2021    Discharge date: 21    PCP: Karlee Leggett MD    Discharge Diagnoses:    Vit B12 deficiency POA B-12 of 174    Relative Hypotension POA    Generalized weakness (2021)    Recurrent falls (2021)    Adult failure to thrive (2021)    Cirrhosis POA     Abnormal LFTs POA     Cholelithiasis POA     Anemia of chronic disease and iron deficiency POA HgB 9.8    Chronic thrombocytopenia POA PLTs 74,000 to 91,000    Essential HTN (hypertension) POA    Acquired hypothyroidism      Hyperlipidemia     Overweight POA Body mass index is 26.78 kg/m². Code status: Full      Discharge Medications:  Current Discharge Medication List      START taking these medications    Details   cyanocobalamin (VITAMIN B12) 1,000 mcg/mL injection 1000 mg IM every wednesday starting 12/8 x 4 shots then monthly  Indications: inadequate vitamin B12  Qty: 1 mL, Refills: 0    Associated Diagnoses: B12 deficiency; Ambulatory dysfunction         CONTINUE these medications which have CHANGED    Details   sertraline (ZOLOFT) 100 mg tablet Take 0.5 Tablets by mouth daily. Qty: 30 Tablet, Refills: 3         CONTINUE these medications which have NOT CHANGED    Details   aspirin delayed-release 81 mg tablet Take 81 mg by mouth daily. budesonide (PULMICORT) 180 mcg/actuation aepb inhaler Take 2 Puffs by inhalation daily. albuterol (PROVENTIL VENTOLIN) 2.5 mg /3 mL (0.083 %) nebu Take 2.5 mg by inhalation every four (4) hours as needed for Wheezing. atorvastatin (LIPITOR) 80 mg tablet Take 80 mg by mouth daily. levothyroxine (SYNTHROID) 137 mcg tablet Take 112 mcg by mouth Daily (before breakfast). spironolactone (ALDACTONE) 25 mg tablet Take 25 mg by mouth daily as needed for PRN Reason (Other) (for swelling in ankles or abdomen).     Comments: 1/2 of a 50mg tablet         STOP taking these medications       metoprolol tartrate (LOPRESSOR) 25 mg tablet Comments:   Reason for Stopping:         lisinopril-hydroCHLOROthiazide (PRINZIDE, ZESTORETIC) 20-25 mg per tablet Comments:   Reason for Stopping: Follow-up Information     Follow up With Specialties Details Why Contact Info    Moraima Castaneda MD Family Medicine Schedule an appointment as soon as possible for a visit in 1 week after discharge from rehab [address]  [address]  [address] [facility]      Devi Porter NP Hepatology Schedule an appointment as soon as possible for a visit in 6 weeks  [address]  [phone]            Time spent on discharge:   I spent greater than 30 minutes on discharge, seeing and examining the patient, reconciling home meds and new meds, coordinating care with case management, doing the discharge papers and the D/C summary    Discharge disposition: SNF    Discharge Condition: Stable    Summary of admission H+P(copied from Dr César Cantrell Note):     CHIEF COMPLAINT: weakness, frequent falls     HISTORY OF PRESENT ILLNESS:     Patrizia Zuleta is a 73 y.o. WHITE/NON- female with medical history including but not limited to cirrhosis of liver, hypertension, hypothyroidism, hyperlipidemia presented today to the emergency room with generalized weakness and fatigue, failure to thrive and recurrent falls. Reportedly she had notably become more and more unstable on her feet and has fallen multiple times resulting in significant bruising to her back with some abrasion to lower extremities and bruising. She states that she feels very weak when she stands up or tries to ambulate. She denies hitting head with any of her falls. Patient and family have verbalized concern as patient is not safe at home anymore and she lives alone.     Head CT without contrast done in the emergency department reports no acute intracranial abnormality is confirmed. Microvascular ischemic and age-related change mild and stable.     Cervical spine CT was done as well which reported no acute abnormality with degenerative changes at C4-5-C6-7     Chest and abdomen pelvis CT without contrast reported no acute fracture or other posttraumatic abnormality in the chest, abdomen, or pelvis. Mild right hemicolon wall thickening may represent nonspecific infection or inflammation. Stable cirrhotic liver morphology with a small amount of ascites. Cholelithiasis.     We were asked to admit for work up and evaluation of the above     Head CT read by radiology FINDINGS:  The ventricles and sulci are normal in size, shape and configuration. . Mild  periventricular white matter low-density is stable. . There is no intracranial  hemorrhage, extra-axial collection, or mass effect. The basilar cisterns are  open. No CT evidence of acute infarct. The bone windows demonstrate no acute abnormalities. Stable mucoperiosteal  thickening significant in the right maxillary sinus with a large retention cyst  in the left maxillary sinus. .  IMPRESSION  1. No acute intracranial abnormality is confirmed. 2. Microvascular ischemic and age-related change mild and stable. C-spine CT read by radiology FINDINGS:  There is no acute fracture or subluxation. Vertebral body heights are  maintained. There is intervertebral disc space narrowing with posterior disc  osteophyte complexes at C4-5 through C6-7. There is mild spinal canal stenosis  and bilateral neural foraminal stenosis at these levels. There is no abnormality  in alignment. The paraspinal soft tissues are unremarkable. The visualized lung  apices are clear. IMPRESSION  No acute abnormality. Degenerative changes at C4-5 through C6-7. CT chest/abdomen/pelvis read by radiology FINDINGS:   CT chest:    The visualized thyroid gland is unremarkable. The aorta and main pulmonary  artery are normal in caliber.  The heart size is normal.  There is no pericardial  or pleural effusion. There are no enlarged axillary, mediastinal, or hilar lymph nodes. There are  small calcified hilar and mediastinal lymph nodes. There is no lung mass or airspace opacity. There is no pneumothorax. The  central airways are clear.     CT abdomen and pelvis: There is a stable cirrhotic liver morphology without focal liver mass. The  spleen, pancreas, and adrenal glands are normal. There are small gallstones  without intra- or extra-hepatic biliary dilatation. The kidneys are symmetric without hydronephrosis. There are stable right kidney  cysts. There are no dilated bowel loops. There is mild right hemicolon wall thickening  with minimal adjacent stranding. The appendix is normal.    There are no enlarged lymph nodes. There is a small amount of free fluid. There  is no free air. The aorta tapers without aneurysm. The urinary bladder is normal.  There is no pelvic mass. There is no acute fracture or aggressive bony lesion. IMPRESSION  1. No acute fracture or other posttraumatic abnormality in the chest, abdomen,  or pelvis. 2. Mild right hemicolon wall thickening may represent nonspecific infection or  inflammation. 3. Stable cirrhotic liver morphology with a small amount of ascites. 4. Cholelithiasis. Echo TTE read by cardiology  Left Ventricle Normal cavity size and wall thickness. Wall motion: normal. The estimated EF is 55 - 60%. Visually measured ejection fraction. Left Atrium Mildly dilated left atrium. Right Ventricle Normal cavity size and global systolic function. Right Atrium Normal cavity size. Aortic Valve Trileaflet valve structure, no stenosis and no regurgitation. Mild aortic valve sclerosis. Mitral Valve No stenosis. Mitral valve non-specific thickening. Mild regurgitation. Tricuspid Valve Normal valve structure and no stenosis. Mild to moderate regurgitation. Pulmonary hypertension found to be mild.    Pulmonic Valve Pulmonic valve not well visualized. No stenosis and no regurgitation. Aorta Normal aortic root, ascending aortic, and aortic arch. Pulmonary Artery Pulmonary arterial systolic pressure (PASP) is 50 mmHg. Pulmonary hypertension found to be mild. IVC/Hepatic Veins Normal structure. Normal central venous pressure (3 mmHg); IVC diameter is less than 21 mm and collapses more than 50% with respiration. Pericardium No evidence of pericardial effusion. Hospital course:       Vit B12 deficiency POA B-12 of 174  Unclear how much of the falls,weakness are related to this  Supplement with shots and follow over time  Father was B12 deficient    Relative Hypotension POA  Generalized weakness (12/1/2021)  Recurrent falls (12/1/2021)  Adult failure to thrive (12/1/2021)  Progressive weakness and falls over months  Apparently increased PTA with multiple falls       Bruising and abrasions  Admitted with failure to thrive, awaiting placement         Consult CM, no note since admit  Head CT ( multiple over past 2 months)       No acute intracranial abnormality is confirmed. Microvascular ischemic and age-related change mild and stable. C-spine CT        No acute abnormality. Degenerative changes at C4-5 through C6-7, mold central canal stenosis  CT Chest/abdomen/pelvis      No acute fracture or other posttraumatic abnormality      No ASD     Mild right hemicolon wall thickening. ?nonspecific infection or inflammation.     Stable cirrhotic liver morphology with a small amount of ascites.     Cholelithiasis.   Echo LVEF 55-60%, normal RV size and function, mild MR, mild pulm HTN  Normal TSH  Low B12, see above  Orthostatics this AM  Laying          110/60, 64                                     Standing       94/66  Holding all BP meds for now  Really sounds like some of this is orthostatic  CM consult for placement, ready for discharge  Cortisol 8.1  Called sister Marley Cook and updated her on plan and diagnoses with pts permission    Cirrhosis POA   Abnormal LFT POA AST  Cholelithiasis POA   Seen by Rodolfo Kaur and [facility] since summer 2021  Cirrhotic liver appearance on CT scan  Liver biopsy          Fibrosis: Severe, numerous bridges and septae, score 3          Steatosis: Minimal          No stainable iron  Hep C PCR negative, assuming other serologies sent  Ammonia minimally elevated  Set up outpatient hepatology follow up    Anemia of chronic disease and iron deficiency POA HgB 9.8  Iron studies in Oct 2021 iron 56, TIBC 508, iron sat 11%, ferritin 23  Refer to GI about colonoscopy, her last one was over 10 years ago she thinks    Chronic thrombocytopenia POA PLTs 74,000 to 91,000  Suspect related to cirrhosis  Stable  Check hemolysis labs, but less likely    Essential HTN (hypertension) POA  BP running low, hold home BP meds  Monitor     Acquired hypothyroidism   TSH within normal limit, mildly elevated free T4  Decrease her Synthroid dose from 112 to 100, recheck TSH within 4 to 6 weeks      Hyperlipidemia   Continue Lipitor  Lipid profile within normal limit    Overweight POA Body mass index is 26.78 kg/m². Code status: Full  Prophylaxis: Lovenox  Recommended Disposition: TBD     Subjective:     Chief Complaint / Reason for Physician Visit   Follow-up weakness, no acute issues  \"no problems\"  No complaints, denies Pain or SOB  BP stable off all meds  Discussed with RN events overnight. Review of Systems:  Symptom Y/N Comments  Symptom Y/N Comments   Fever/Chills n   Chest Pain n    Poor Appetite    Edema     Cough n   Abdominal Pain n    Sputum    Joint Pain n    SOB/CHAN n   Pruritis/Rash     Nausea/vomit n   Tolerating PT/OT     Diarrhea n   Tolerating Diet y    Constipation    Other       Could NOT obtain due to:      Objective:     VITALS:   Last 24hrs VS reviewed since prior progress note.  Most recent are:  Patient Vitals for the past 24 hrs:   Temp Pulse Resp BP SpO2   12/06/21 0811 -- 63 16 101/63 94 %   12/06/21 0724 -- -- -- -- 97 %   12/06/21 0308 97.6 °F (36.4 °C) 66 16 120/68 95 %   12/05/21 2259 97.3 °F (36.3 °C) 71 15 101/60 94 %   12/05/21 2122 -- 76 -- 125/70 --   12/05/21 2004 -- -- -- -- 95 %   12/05/21 1944 97.4 °F (36.3 °C) 77 18 118/75 95 %   12/05/21 1507 98.2 °F (36.8 °C) 69 18 110/75 96 %   12/05/21 1106 97.8 °F (36.6 °C) 75 18 125/75 96 %       Intake/Output Summary (Last 24 hours) at 12/6/2021 0946  Last data filed at 12/6/2021 4612  Gross per 24 hour   Intake 360 ml   Output 400 ml   Net -40 ml        I had a face to face encounter and independently examined this patient on 12/6/2021, as outlined below:  PHYSICAL EXAM:  General: WD, WN. Alert, cooperative, no acute distress    EENT:  Anicteric sclerae. MMM  Resp:  CTA bilaterally, no wheezing or rales. No accessory muscle use  CV:  Regular  rhythm,  No edema  GI:  Soft, Non distended, Non tender. +Bowel sounds  Neurologic:  Alert and oriented X 3, normal speech, 5/5 motor strength bilaterally    No Cogwheeling  Psych:   Good insight. Not anxious nor agitated  Skin:  No rashes. No jaundice, extensive bruises on back    Reviewed most current lab test results and cultures  YES  Reviewed most current radiology test results   YES  Review and summation of old records today    NO  Reviewed patient's current orders and MAR    YES  PMH/SH reviewed - no change compared to H&P  ________________________________________________________________________  Care Plan discussed with:    Comments   Patient x    Family      RN x    Care Manager     Consultant                        Multidiciplinary team rounds were held today with , nursing, pharmacist and clinical coordinator. Patient's plan of care was discussed; medications were reviewed and discharge planning was addressed.      ________________________________________________________________________        Comments   >50% of visit spent in counseling and coordination of care ________________________________________________________________________  Marianela Rosado MD     Procedures: see electronic medical records for all procedures/Xrays and details which were not copied into this note but were reviewed prior to creation of Plan. LABS:  I reviewed today's most current labs and imaging studies.   Pertinent labs include:  Recent Labs     12/05/21 0236 12/04/21  0054   WBC 4.3 4.2   HGB 10.1* 9.8*   HCT 31.4* 30.5*   PLT 79* 84*     Recent Labs     12/05/21 0236 12/04/21  0054    137   K 3.6 3.8   * 109*   CO2 23 24   GLU 89 82   BUN 12 13   CREA 0.70 0.72   CA 8.4* 8.2*   ALB 2.1* 2.1*   TBILI 1.6*  1.3* 1.3*   ALT 90* 89*       Signed: Marianela Rosado MD

## 2021-12-06 NOTE — PROGRESS NOTES
0730 Bedside shift change report given to [address] (oncoming nurse) by Blanca Saleh RN (offgoing nurse). Report included the following information SBAR, Kardex, ED Summary, MAR, Recent Results and Cardiac Rhythm NSR.

## 2021-12-06 NOTE — PROGRESS NOTES
Problem: Mobility Impaired (Adult and Pediatric)  Goal: *Acute Goals and Plan of Care (Insert Text)  Description: FUNCTIONAL STATUS PRIOR TO ADMISSION: Patient is a poor historian, however reports ambulating with RW at baseline. Reports being mod I for ADLs, however does not shower unless aides are present (~4x/week). Notes significant fall history since April/May 2021. Reports that her neighbor helps her as needed. Has been receiving HHPT/OT/SLP. HOME SUPPORT PRIOR TO ADMISSION: The patient lived alone with neighbor to provide assistance. Physical Therapy Goals  Initiated 12/3/2021  1. Patient will move from supine to sit and sit to supine , scoot up and down, and roll side to side in bed with supervision/set-up within 7 day(s). 2.  Patient will transfer from bed to chair and chair to bed with supervision/set-up using the least restrictive device within 7 day(s). 3.  Patient will perform sit to stand with supervision/set-up within 7 day(s). 4.  Patient will ambulate with supervision/set-up for 100 feet with the least restrictive device within 7 day(s). 5.  Patient will ascend/descend 5 stairs with 1 handrail(s) with supervision/set-up within 7 day(s). Outcome: Progressing Towards Goal   PHYSICAL THERAPY TREATMENT  Patient: Ty Castaneda (5 y.o. female)  Date: 12/6/2021  Diagnosis: Generalized weakness [R53.1]  Recurrent falls [R29.6]  Adult failure to thrive [R62.7]   Weakness generalized       Precautions: Fall, Bed Alarm  Chart, physical therapy assessment, plan of care and goals were reviewed. ASSESSMENT  Patient continues with skilled PT services and is slowly progressing towards goals. Patient continues to demonstrate limited functional mobility and decreased independence secondary to impaired sitting and standing balance, impaired gait mechanics, generalized weakness, symptomatic orthostatic hypotension, and decreased activity tolerance.  Received supine in bed and agreeable to PT intervention. VS noted in doc flowsheet for 4169-4617. Patient with significant decline in BP in standing only and recovered quickly once seated. Patient asymptomatic with initial stand and bed>chair transfer. Completed additional standing trial after seated rest break and performed standing hip flexion with instruction, noting further decline in BP and pt endorsing dizziness. BP stable once seated, therefore left sitting in recliner post-session with VSS. Continued to demonstrate mild posterior lean in standing and also with L lateral LOB during standing hip flexion despite use of RW. Patient with less confusion this date, however continues to need cueing to improve safety. Pt was left with all needs met, RN aware, chair alarm on, and VSS following session. Recommend SNF rehab at discharge to address functional impairments as noted above. Vitals:    12/06/21 0955 12/06/21 0957 12/06/21 0959 12/06/21 1002   BP: 108/71 137/73 99/73 129/80   BP 2:       BP 1 Location:       BP Patient Position: Standing Sitting Standing Sitting   Pulse: 87 62 89 63   Pulse 2:       Temp:       Resp: 16 17 19 18   Height:       Weight:       SpO2:  99%  99%        Current Level of Function Impacting Discharge (mobility/balance): SBA for bed mobility; CGA for sit<>stand transfers; min A for gait training with RW support    Other factors to consider for discharge: increased risk for falls; decline from baseline mobility; lives alone; decreased safety awareness; lacks insight into functional deficits         PLAN :  Patient continues to benefit from skilled intervention to address the above impairments. Continue treatment per established plan of care. to address goals.     Recommendation for discharge: (in order for the patient to meet his/her long term goals)  Therapy up to 5 days/week in SNF setting    This discharge recommendation:  Has been made in collaboration with the attending provider and/or case management    IF patient discharges home will need the following DME: to be determined (TBD)       SUBJECTIVE:   Patient stated I'll try.     OBJECTIVE DATA SUMMARY:   Critical Behavior:  Neurologic State: Alert  Orientation Level: Oriented X4 (periods of confusion)  Cognition: Follows commands  Safety/Judgement: Decreased awareness of need for safety, Decreased awareness of need for assistance, Decreased awareness of environment, Lack of insight into deficits  Functional Mobility Training:  Bed Mobility:     Supine to Sit: Stand-by assistance; Additional time     Scooting: Stand-by assistance; Additional time        Transfers:  Sit to Stand: Contact guard assistance  Stand to Sit: Contact guard assistance        Bed to Chair: Minimum assistance; Assist x1; Additional time; Adaptive equipment (use of RW)                    Balance:  Sitting: Impaired  Sitting - Static: Good (unsupported)  Sitting - Dynamic: Fair (occasional)  Standing: Impaired; With support  Standing - Static: Good; Fair; Constant support  Standing - Dynamic : Fair; Constant support  Ambulation/Gait Training:  Distance (ft): 4 Feet (ft) (bed>chair)  Assistive Device: Gait belt; Walker, rolling  Ambulation - Level of Assistance: Minimal assistance; Additional time; Assist x1; Adaptive equipment        Gait Abnormalities: Decreased step clearance; Shuffling gait (mild posterior lean)        Base of Support: Narrowed; Center of gravity altered     Speed/Winnie: Pace decreased (<100 feet/min);  Slow  Step Length: Left shortened; Right shortened         Therapeutic Exercises:   Standing hip flexion 10x BLE - min A and RW support  Pain Rating:  No pain complaints    Activity Tolerance:   Fair, requires rest breaks, and signs and symptoms of orthostatic hypotension    After treatment patient left in no apparent distress:   Sitting in chair, Call bell within reach, and Bed / chair alarm activated    COMMUNICATION/COLLABORATION:   The patients plan of care was discussed with: Physical therapist, Occupational therapist, and Registered nurse.      Greer Stallings, PT, DPT   Time Calculation: 27 mins

## 2021-12-06 NOTE — PROGRESS NOTES
Problem: Hypertension  Goal: *Fluid volume balance  Outcome: Progressing Towards Goal  Goal: *Labs within defined limits  Outcome: Progressing Towards Goal

## 2021-12-06 NOTE — PROGRESS NOTES
Hospitalist Progress Note    NAME: Hermelindo Casillas   :  1950   MRN:  [id number]       Assessment / Plan:    Vit B12 deficiency POA B-12 of 174  Unclear how much of the falls,weakness are related to this  Supplement with shots and follow over time  Her father was B12 deficient    Orthostatic Hypotension POA  Generalized weakness (2021)  Recurrent falls (2021)  Adult failure to thrive (2021)  Progressive weakness and falls over months  Apparently increased PTA with multiple falls       Bruising and abrasions  Admitted with failure to thrive, awaiting placement         Consult CM, no note since admit  Head CT ( multiple over past 2 months)       No acute intracranial abnormality is confirmed. Microvascular ischemic and age-related change mild and stable. C-spine CT        No acute abnormality. Degenerative changes at C4-5 through C6-7, mold central canal stenosis  CT Chest/abdomen/pelvis      No acute fracture or other posttraumatic abnormality      No ASD     Mild right hemicolon wall thickening. ?nonspecific infection or inflammation.     Stable cirrhotic liver morphology with a small amount of ascites.     Cholelithiasis.   Echo LVEF 55-60%, normal RV size and function, mild MR, mild pulm HTN  Normal TSH  Low B12, see above  Orthostatics this AM  Laying          110/60, 64                                     Standing       94/66  Holding all BP meds for now  Really sounds like some of this is orthostatic  CM consult for placement, ready for discharge  Cortisol 8.1  Called sister Alyssia Donovan and updated her on plan and diagnoses with pts permission    Cirrhosis POA   Abnormal LFT POA AST  Cholelithiasis POA   Seen by Azucena Li and [facility] since summer 2021  Cirrhotic liver appearance on CT scan  Liver biopsy          Fibrosis: Severe, numerous bridges and septae, score 3          Steatosis: Minimal          No stainable iron  Hep C PCR negative, assuming other serologies sent  Ammonia minimally elevated  Set up outpatient hepatology follow up    Anemia of chronic disease and iron deficiency POA HgB 9.8  Iron studies in Oct 2021 iron 56, TIBC 508, iron sat 11%, ferritin 23  Refer to GI about colonoscopy, her last one was over 10 years ago she thinks    Chronic thrombocytopenia POA PLTs 74,000 to 91,000  Suspect related to cirrhosis  Stable  Check hemolysis labs, but less likely    Essential HTN (hypertension) POA  BP running low, hold home BP meds  Monitor     Acquired hypothyroidism   TSH within normal limit, mildly elevated free T4  Decrease her Synthroid dose from 112 to 100, recheck TSH within 4 to 6 weeks      Hyperlipidemia   Continue Lipitor  Lipid profile within normal limit    Overweight POA Body mass index is 26.78 kg/m². Code status: Full  Prophylaxis: Lovenox  Recommended Disposition: TBD     Subjective:     Chief Complaint / Reason for Physician Visit   Follow-up weakness, no acute issues  \"no problems\"  No complaints, denies Pain or SOB  BP stable off all meds  Discussed with RN events overnight. Review of Systems:  Symptom Y/N Comments  Symptom Y/N Comments   Fever/Chills n   Chest Pain n    Poor Appetite    Edema     Cough n   Abdominal Pain n    Sputum    Joint Pain n    SOB/CHAN n   Pruritis/Rash     Nausea/vomit n   Tolerating PT/OT     Diarrhea n   Tolerating Diet y    Constipation    Other       Could NOT obtain due to:      Objective:     VITALS:   Last 24hrs VS reviewed since prior progress note.  Most recent are:  Patient Vitals for the past 24 hrs:   Temp Pulse Resp BP SpO2   12/06/21 0811  63 16 101/63 94 %   12/06/21 0724     97 %   12/06/21 0308 97.6 °F (36.4 °C) 66 16 120/68 95 %   12/05/21 2259 97.3 °F (36.3 °C) 71 15 101/60 94 %   12/05/21 2122  76  125/70    12/05/21 2004     95 %   12/05/21 1944 97.4 °F (36.3 °C) 77 18 118/75 95 %   12/05/21 1507 98.2 °F (36.8 °C) 69 18 110/75 96 %   12/05/21 1106 97.8 °F (36.6 °C) 75 18 125/75 96 %       Intake/Output Summary (Last 24 hours) at 12/6/2021 0945  Last data filed at 12/6/2021 9102  Gross per 24 hour   Intake 360 ml   Output 400 ml   Net -40 ml        I had a face to face encounter and independently examined this patient on 12/6/2021, as outlined below:  PHYSICAL EXAM:  General: WD, WN. Alert, cooperative, no acute distress    EENT:  Anicteric sclerae. MMM  Resp:  CTA bilaterally, no wheezing or rales. No accessory muscle use  CV:  Regular  rhythm,  No edema  GI:  Soft, Non distended, Non tender. +Bowel sounds  Neurologic:  Alert and oriented X 3, normal speech, 5/5 motor strength bilaterally    No Cogwheeling  Psych:   Good insight. Not anxious nor agitated  Skin:  No rashes. No jaundice, extensive bruises on back    Reviewed most current lab test results and cultures  YES  Reviewed most current radiology test results   YES  Review and summation of old records today    NO  Reviewed patient's current orders and MAR    YES  PMH/ reviewed - no change compared to H&P  ________________________________________________________________________  Care Plan discussed with:    Comments   Patient x    Family      RN x    Care Manager     Consultant                        Multidiciplinary team rounds were held today with , nursing, pharmacist and clinical coordinator. Patient's plan of care was discussed; medications were reviewed and discharge planning was addressed. ________________________________________________________________________        Comments   >50% of visit spent in counseling and coordination of care     ________________________________________________________________________  Rk Dawson MD     Procedures: see electronic medical records for all procedures/Xrays and details which were not copied into this note but were reviewed prior to creation of Plan. LABS:  I reviewed today's most current labs and imaging studies.   Pertinent labs include:  Recent Labs     12/05/21 0236 12/04/21  0054   WBC 4.3 4.2   HGB 10.1* 9.8*   HCT 31.4* 30.5*   PLT 79* 84*     Recent Labs     12/05/21 0236 12/04/21  0054    137   K 3.6 3.8   * 109*   CO2 23 24   GLU 89 82   BUN 12 13   CREA 0.70 0.72   CA 8.4* 8.2*   ALB 2.1* 2.1*   TBILI 1.6*  1.3* 1.3*   ALT 90* 89*       Signed: Isai Valdez MD

## 2021-12-06 NOTE — PROGRESS NOTES
End of Shift Note    Bedside shift change report given to  Avenue Rebeca Quiroz (oncoming nurse) by Mario Andrews (offgoing nurse). Report included the following information SBAR and Kardex    Shift worked:  [id number]     Shift summary and any significant changes:    Pt had less confusion overnight and made no attempts to get out of bed unassisted. Concerns for physician to address:    Zone phone for oncoming shift:       Activity:  Activity Level: Up with Assistance  Number times ambulated in hallways past shift: 0  Number of times OOB to chair past shift: 0    Cardiac:   Cardiac Monitoring: Yes      Cardiac Rhythm: Sinus Rhythm    Access:   Current line(s): PIV     Genitourinary:   Urinary status: external catheter    Respiratory:   O2 Device: None (Room air)  Chronic home O2 use?: NO  Incentive spirometer at bedside: N/A     GI:  Last Bowel Movement Date: 12/05/21  Current diet:  ADULT ORAL NUTRITION SUPPLEMENT Breakfast; Standard High Calorie/High Protein  ADULT DIET Regular; Low Fat/Low Chol/High Fiber/WILLIS; Chocolate flavor Ensure Enlive every day with breakfast meal  Passing flatus: YES  Tolerating current diet: YES       Pain Management:   Patient states pain is manageable on current regimen: YES    Skin:  Mark Score: 17  Interventions: increase time out of bed and internal/external urinary devices    Patient Safety:  Fall Score:  Total Score: 5  Interventions: bed/chair alarm, gripper socks and pt to call before getting OOB  High Fall Risk: Yes    Length of Stay:  Expected LOS: 2d 14h  Actual LOS: R Huma Bradley 38

## 2021-12-06 NOTE — PROGRESS NOTES
Problem: Self Care Deficits Care Plan (Adult)  Goal: *Acute Goals and Plan of Care (Insert Text)  Description:   FUNCTIONAL STATUS PRIOR TO ADMISSION: Pt reports that she lives alone. She has HH OT PT SLP PTA. Pt states that she is independent in self care/mobility and reports frequent falls-multiple bruises     HOME SUPPORT: has [facility] therapies    Occupational Therapy Goals  Initiated 12/3/2021  1. Patient will perform grooming in standing with supervision/set-up within 7 day(s). 2.  Patient will perform bathing with minimal assistance/contact guard assist within 7 day(s). 3.  Patient will perform upper body dressing and lower body dressing with minimal assistance/contact guard assist within 7 day(s). 4.  Patient will perform toilet transfers with supervision/set-up within 7 day(s). 5.  Patient will perform all aspects of toileting with minimal assistance/contact guard assist within 7 day(s). 6.  Patient will participate in upper extremity therapeutic exercise/activities with supervision/set-up for 6 minutes within 7 day(s). 7.  Patient will tolerate standing adls for at least 7 minutes with minimal verbal cues within 7 day(s). 8.  Patient will demonstrate awareness of safe technique during adls within 7 days. Outcome: Progressing Towards Goal   OCCUPATIONAL THERAPY TREATMENT  Patient: Chestine Loss (30 y.o. female)  Date: 12/6/2021  Diagnosis: Generalized weakness [R53.1]  Recurrent falls [R29.6]  Adult failure to thrive [R62.7]   Weakness generalized       Precautions: Fall, Bed Alarm  Chart, occupational therapy assessment, plan of care, and goals were reviewed. ASSESSMENT  Patient continues with skilled OT services and is progressing towards goals. Pt was agreeable to treatment and seemed less confused with improved concentration/focusing on activities this date. BP was taken throughout--BP generally stable in sitting, and did drop in standing position. Nursing was informed.    Pt performed transfer bed to chair taking steps with the RW requiring Saloni X1 to 2 and cues for safe technique and RW management. Pt continues to need assistance for adls and mobility and recommend SNF rehab at discharge. Current Level of Function Impacting Discharge (ADLs): up to max A for adls; assist X1 to 2 for functional mobility    Other factors to consider for discharge: was living alone PTA, hx of multiple falls         PLAN :  Patient continues to benefit from skilled intervention to address the above impairments. Continue treatment per established plan of care to address goals. Recommend with staff: encourage upright and OOB, [facility] to [facility]      Recommendation for discharge: (in order for the patient to meet his/her long term goals)  Therapy up to 5 days/week in SNF setting    This discharge recommendation:  Has not yet been discussed the attending provider and/or case management    IF patient discharges home will need the following DME: tbd - pt plans for rehab        SUBJECTIVE:   Patient stated I don't feel dizzy.     OBJECTIVE DATA SUMMARY:   Cognitive/Behavioral Status:     Orientation Level: Oriented to person; Oriented to place  Cognition: Follows commands  Perception: Appears intact  Perseveration: No perseveration noted  Safety/Judgement: Decreased awareness of need for assistance; Decreased awareness of need for safety; Decreased insight into deficits; Fall prevention    Functional Mobility and Transfers for ADLs:  Bed Mobility:  Supine to Sit: Stand-by assistance; Additional time  Scooting: Stand-by assistance; Additional time    Transfers:  Sit to Stand: Contact guard assistance     Bed to Chair: Minimum assistance; Assist x1; Additional time; Adaptive equipment (use of RW)    Balance:  Sitting: Impaired  Sitting - Static: Good (unsupported)  Sitting - Dynamic: Fair (occasional)  Standing: Impaired;  With support  Standing - Static: Good; Fair; Constant support  Standing - Dynamic : Fair; Constant support    ADL Intervention:       Grooming  Position Performed: Seated in chair  Washing Face: Set-up  Washing Hands: Set-up                   Lower Body Dressing Assistance  Socks: Maximum assistance    Toileting  Bladder Hygiene: Supervision; Stand-by assistance  Clothing Management: Minimum assistance    Cognitive Retraining  Safety/Judgement: Decreased awareness of need for assistance; Decreased awareness of need for safety; Decreased insight into deficits; Fall prevention    Pain:  No complaints    Activity Tolerance:   Fair  VSS in seated positions. BP decreased in standing. See doc flow sheets for VS taken during tx session  After treatment patient left in no apparent distress:   Sitting in chair, Call bell within reach, Bed / chair alarm activated, and nurse informed    COMMUNICATION/COLLABORATION:   The patients plan of care was discussed with: Physical therapist and Registered nurse.      Tere Belle OTR/L  Time Calculation: 25 mins

## 2021-12-06 NOTE — PROGRESS NOTES
Hospital to  [address]                                                                        73 y.o.   female    [facility]   Room: 2225/01    [facility]  Unit Phone# :  [phone]      [address]  [facility]  [facility]  [place] [address]  Dept: [phone]  Loc: [phone]                    SITUATION     Admitted:  12/1/2021         Attending Provider:  Kaela Ceballos MD       Consultations:  IP CONSULT TO HOSPITALIST    PCP:  Ellie Villa MD   [phone]    Treatment Team: Attending Provider: Kaela Ceballos MD; Consulting Provider: Maik Chavez MD; Utilization Review: Darrell Guzman RN; Care Manager: Nilam Ovalle; Primary Nurse: Linh Blake RN; Primary Nurse: Soraya Stephenson    Admitting Dx:  Generalized weakness [R53.1]  Recurrent falls [R29.6]  Adult failure to thrive [R62.7]       Principal Problem: Weakness generalized    * No surgery found * of      BY: * Surgery not found *             ON: * No surgery found *                  Code Status: Full Code                Advance Directives:   Advance Care Planning 12/2/2021   Confirm Advance Directive Yes, on file    (Send w/patient)   Not Received       Isolation:  There are currently no Active Isolations       MDRO: No current active infections    Pain Medications given:  None    Last dose:  at      Special Equipment needed: no  Type of equipment:      (Not currently on dialysis)  (Not currently on dialysis)  (Not currently on dialysis)     BACKGROUND     Allergies:  No Known Allergies    Past Medical History:   Diagnosis Date    Breast cancer ([facility] [address]     HTN (hypertension)     Hyperlipidemia        Past Surgical History:   Procedure Laterality Date    HX BILATERAL MASTECTOMY         Medications Prior to Admission   Medication Sig    aspirin delayed-release 81 mg tablet Take 81 mg by mouth daily.     budesonide (PULMICORT) 180 mcg/actuation aepb inhaler Take 2 Puffs by inhalation daily.  albuterol (PROVENTIL VENTOLIN) 2.5 mg /3 mL (0.083 %) nebu Take 2.5 mg by inhalation every four (4) hours as needed for Wheezing.  atorvastatin (LIPITOR) 80 mg tablet Take 80 mg by mouth daily.  levothyroxine (SYNTHROID) 137 mcg tablet Take 112 mcg by mouth Daily (before breakfast).  metoprolol tartrate (LOPRESSOR) 25 mg tablet Take 25 mg by mouth two (2) times a day.  spironolactone (ALDACTONE) 25 mg tablet Take 25 mg by mouth daily as needed for PRN Reason (Other) (for swelling in ankles or abdomen).  lisinopril-hydroCHLOROthiazide (PRINZIDE, ZESTORETIC) 20-25 mg per tablet Take 1 Tablet by mouth daily.  [DISCONTINUED] sertraline (ZOLOFT) 100 mg tablet TAKE 1 AND 1/2 TABLETS BY MOUTH DAILY       Hard scripts included in transfer packet no    Vaccinations:    Immunization History   Administered Date(s) Administered    COVID-19, J&J, PF, 0.5 mL Dose 03/08/2021       Readmission Risks:    Known Risks: The Charlson CoMorbitiy Index tool is an evidenced based tool that has more automatic generated information. The tool looks at many different items such as the age of the patient, how many times they were admitted in the last calendar year, current length of stay in the hospital and their diagnosis. All of these items are pulled automatically from information documented in the chart from various places and will generate a score that predicts whether a patient is at low (less than 13), medium (13-20) or high (21 or greater) risk of being readmitted.         ASSESSMENT                Temp: 97.8 °F (36.6 °C) (12/06/21 1727) Pulse (Heart Rate): 87 (12/06/21 1727)     Resp Rate: 19 (12/06/21 1727)           BP: 128/71 (12/06/21 1727)     O2 Sat (%): 92 % (12/06/21 1727)     Weight: 70 kg (154 lb 4.8 oz)    Height: 5' 4\" (162.6 cm) (12/02/21 1620)       If above not within 1 hour of discharge:    BP:_____ P:____  R:____ T:_____ O2 Sat: ___%  O2: ______    Active Orders   Diet    ADULT DIET Regular; Low Fat/Low Chol/High Fiber/WILLIS; Chocolate flavor Ensure Enlive every day with breakfast meal         Orientation: oriented to time, place, person and situation     Active Behaviors: None                                   Active Lines/Drains:  (Peg Tube / Ramos / CL or S/L?): no    Urinary Status: Voiding, External catheter     Last BM: Last Bowel Movement Date: 12/05/21     Skin Integrity: Intact             Mobility: Slightly limited   Weight Bearing Status: WBAT (Weight Bearing as Tolerated)      Gait Training  Assistive Device: Gait belt, Walker, rolling  Ambulation - Level of Assistance: Minimal assistance, Additional time, Assist x1, Adaptive equipment  Distance (ft): 4 Feet (ft) (bed>chair)         Lab Results   Component Value Date/Time    Glucose 89 12/05/2021 02:36 AM    INR 1.1 10/04/2021 02:21 PM    HGB 10.1 (L) 12/05/2021 02:36 AM    HGB 9.8 (L) 12/04/2021 12:54 AM        RECOMMENDATION     See After Visit Summary (AVS) for:  · Discharge instructions  · After [address]   · Special equipment needed (entered pre-discharge by Care Management)  · Medication Reconciliation    · Follow up Appointment(s)         Report given/sent by:  Lorraine Aguilar                    Verbal report given to:  Tobi Mcdonald RN  FAXED to:  [facility] sent with patient via [facility]         Estimated discharge time:  12/6/2021 at [address]

## 2021-12-16 NOTE — PROGRESS NOTES
Post Acute Facility Update     *The information contained in this note was received during a weekly care coordination call with the post acute facility*    Current Facility: [address] [place] [address], Neal Dowell ([facility])  Anticipated Discharge Date: D    Facility Nursing Update: Resident has  is alert and oriented x2. She report she is participating with rehab therapy as tolerated. No acute issues at this time. Facility Social Work Update: Resident was admitted for short term skilled care and plans to stay LTC here. SS will assist as needed. Bundle Program Status: none  Upper Extremity Assistance: Minimal Assistance   Lower Extremity Assistance: Contact Guard Assist - hands on patient for balance   Bed Mobility: Contact Guard Assist - hands on patient for balance   Transfers: Contact Guard Assist - hands on patient for balance   Ambulation: Minimal Assistance   How far (in feet) is the patient ambulating?  100  Device Used:walker  Barriers to Discharge: JOHNATHAN Mcgill

## 2022-01-01 ENCOUNTER — HOME CARE VISIT (OUTPATIENT)
Dept: HOSPICE | Facility: HOSPICE | Age: 72
End: 2022-01-01
Payer: MEDICARE

## 2022-01-01 ENCOUNTER — HOSPITAL ENCOUNTER (INPATIENT)
Age: 72
LOS: 9 days | Discharge: HOSPICE | DRG: 432 | End: 2022-02-13
Attending: EMERGENCY MEDICINE | Admitting: HOSPITALIST
Payer: MEDICARE

## 2022-01-01 ENCOUNTER — PATIENT OUTREACH (OUTPATIENT)
Dept: CASE MANAGEMENT | Age: 72
End: 2022-01-01

## 2022-01-01 ENCOUNTER — HOSPITAL ENCOUNTER (EMERGENCY)
Age: 72
Discharge: HOME | End: 2022-01-31
Attending: EMERGENCY MEDICINE
Payer: MEDICARE

## 2022-01-01 ENCOUNTER — APPOINTMENT (OUTPATIENT)
Dept: CT IMAGING | Age: 72
DRG: 432 | End: 2022-01-01
Attending: EMERGENCY MEDICINE
Payer: MEDICARE

## 2022-01-01 ENCOUNTER — HOME CARE VISIT (OUTPATIENT)
Dept: SCHEDULING | Facility: HOME HEALTH | Age: 72
End: 2022-01-01
Payer: MEDICARE

## 2022-01-01 ENCOUNTER — APPOINTMENT (OUTPATIENT)
Dept: ULTRASOUND IMAGING | Age: 72
End: 2022-01-01
Attending: EMERGENCY MEDICINE
Payer: MEDICARE

## 2022-01-01 ENCOUNTER — DOCUMENTATION ONLY (OUTPATIENT)
Dept: HEMATOLOGY | Age: 72
End: 2022-01-01

## 2022-01-01 ENCOUNTER — APPOINTMENT (OUTPATIENT)
Dept: INTERVENTIONAL RADIOLOGY/VASCULAR | Age: 72
DRG: 432 | End: 2022-01-01
Attending: NURSE PRACTITIONER
Payer: MEDICARE

## 2022-01-01 ENCOUNTER — APPOINTMENT (OUTPATIENT)
Dept: ULTRASOUND IMAGING | Age: 72
DRG: 432 | End: 2022-01-01
Payer: MEDICARE

## 2022-01-01 ENCOUNTER — HOSPICE ADMISSION (OUTPATIENT)
Dept: HOSPICE | Facility: HOSPICE | Age: 72
End: 2022-01-01
Payer: MEDICARE

## 2022-01-01 ENCOUNTER — APPOINTMENT (OUTPATIENT)
Dept: ULTRASOUND IMAGING | Age: 72
DRG: 432 | End: 2022-01-01
Attending: PHYSICIAN ASSISTANT
Payer: MEDICARE

## 2022-01-01 VITALS
SYSTOLIC BLOOD PRESSURE: 118 MMHG | HEART RATE: 80 BPM | RESPIRATION RATE: 20 BRPM | OXYGEN SATURATION: 98 % | DIASTOLIC BLOOD PRESSURE: 78 MMHG | TEMPERATURE: 98 F

## 2022-01-01 VITALS
WEIGHT: 160 LBS | HEIGHT: 64 IN | HEART RATE: 84 BPM | DIASTOLIC BLOOD PRESSURE: 74 MMHG | OXYGEN SATURATION: 98 % | BODY MASS INDEX: 27.31 KG/M2 | RESPIRATION RATE: 18 BRPM | TEMPERATURE: 98.4 F | SYSTOLIC BLOOD PRESSURE: 132 MMHG

## 2022-01-01 VITALS
SYSTOLIC BLOOD PRESSURE: 140 MMHG | RESPIRATION RATE: 18 BRPM | TEMPERATURE: 97.6 F | BODY MASS INDEX: 25.44 KG/M2 | WEIGHT: 149 LBS | HEART RATE: 79 BPM | OXYGEN SATURATION: 99 % | HEIGHT: 64 IN | DIASTOLIC BLOOD PRESSURE: 73 MMHG

## 2022-01-01 VITALS
TEMPERATURE: 97.8 F | RESPIRATION RATE: 20 BRPM | HEART RATE: 74 BPM | SYSTOLIC BLOOD PRESSURE: 122 MMHG | DIASTOLIC BLOOD PRESSURE: 66 MMHG

## 2022-01-01 VITALS
SYSTOLIC BLOOD PRESSURE: 102 MMHG | DIASTOLIC BLOOD PRESSURE: 60 MMHG | OXYGEN SATURATION: 100 % | RESPIRATION RATE: 20 BRPM | TEMPERATURE: 97.3 F | HEART RATE: 66 BPM

## 2022-01-01 VITALS
HEART RATE: 84 BPM | DIASTOLIC BLOOD PRESSURE: 62 MMHG | SYSTOLIC BLOOD PRESSURE: 120 MMHG | RESPIRATION RATE: 18 BRPM | TEMPERATURE: 97.9 F | OXYGEN SATURATION: 94 %

## 2022-01-01 VITALS — RESPIRATION RATE: 14 BRPM | HEART RATE: 66 BPM | SYSTOLIC BLOOD PRESSURE: 110 MMHG | DIASTOLIC BLOOD PRESSURE: 58 MMHG

## 2022-01-01 VITALS
TEMPERATURE: 98.8 F | DIASTOLIC BLOOD PRESSURE: 76 MMHG | RESPIRATION RATE: 16 BRPM | HEART RATE: 82 BPM | SYSTOLIC BLOOD PRESSURE: 128 MMHG | OXYGEN SATURATION: 95 %

## 2022-01-01 VITALS — HEART RATE: 60 BPM | RESPIRATION RATE: 16 BRPM

## 2022-01-01 DIAGNOSIS — R74.8 ELEVATED LIVER ENZYMES: ICD-10-CM

## 2022-01-01 DIAGNOSIS — Z71.89 DNR (DO NOT RESUSCITATE) DISCUSSION: ICD-10-CM

## 2022-01-01 DIAGNOSIS — R18.8 CIRRHOSIS OF LIVER WITH ASCITES, UNSPECIFIED HEPATIC CIRRHOSIS TYPE (HCC): Primary | ICD-10-CM

## 2022-01-01 DIAGNOSIS — R53.81 DEBILITY: ICD-10-CM

## 2022-01-01 DIAGNOSIS — D69.6 THROMBOCYTOPENIA (HCC): ICD-10-CM

## 2022-01-01 DIAGNOSIS — Z51.5 PALLIATIVE CARE ENCOUNTER: ICD-10-CM

## 2022-01-01 DIAGNOSIS — R18.8 OTHER ASCITES: Primary | ICD-10-CM

## 2022-01-01 DIAGNOSIS — K74.60 CIRRHOSIS OF LIVER WITH ASCITES, UNSPECIFIED HEPATIC CIRRHOSIS TYPE (HCC): Primary | ICD-10-CM

## 2022-01-01 DIAGNOSIS — R17 ELEVATED BILIRUBIN: ICD-10-CM

## 2022-01-01 DIAGNOSIS — R53.1 WEAKNESS: ICD-10-CM

## 2022-01-01 LAB
AFP-TM SERPL-MCNC: 5.8 NG/ML (ref 0–8.3)
ALBUMIN FLD-MCNC: 0.4 G/DL
ALBUMIN SERPL-MCNC: 2 G/DL (ref 3.5–5)
ALBUMIN SERPL-MCNC: 2.8 G/DL (ref 3.5–5)
ALBUMIN SERPL-MCNC: 3.2 G/DL (ref 3.5–5)
ALBUMIN SERPL-MCNC: 3.5 G/DL (ref 3.5–5)
ALBUMIN/GLOB SERPL: 0.5 {RATIO} (ref 1.1–2.2)
ALBUMIN/GLOB SERPL: 0.5 {RATIO} (ref 1.1–2.2)
ALBUMIN/GLOB SERPL: 0.8 {RATIO} (ref 1.1–2.2)
ALBUMIN/GLOB SERPL: 0.9 {RATIO} (ref 1.1–2.2)
ALP SERPL-CCNC: 124 U/L (ref 45–117)
ALP SERPL-CCNC: 125 U/L (ref 45–117)
ALP SERPL-CCNC: 127 U/L (ref 45–117)
ALP SERPL-CCNC: 167 U/L (ref 45–117)
ALT SERPL-CCNC: 56 U/L (ref 12–78)
ALT SERPL-CCNC: 61 U/L (ref 12–78)
ALT SERPL-CCNC: 62 U/L (ref 12–78)
ALT SERPL-CCNC: 83 U/L (ref 12–78)
AMMONIA PLAS-SCNC: 27 UMOL/L
AMMONIA PLAS-SCNC: 48 UMOL/L
ANION GAP SERPL CALC-SCNC: 3 MMOL/L (ref 5–15)
ANION GAP SERPL CALC-SCNC: 5 MMOL/L (ref 5–15)
ANION GAP SERPL CALC-SCNC: 5 MMOL/L (ref 5–15)
ANION GAP SERPL CALC-SCNC: 6 MMOL/L (ref 5–15)
ANION GAP SERPL CALC-SCNC: 6 MMOL/L (ref 5–15)
ANION GAP SERPL CALC-SCNC: 7 MMOL/L (ref 5–15)
ANION GAP SERPL CALC-SCNC: 7 MMOL/L (ref 5–15)
ANION GAP SERPL CALC-SCNC: 8 MMOL/L (ref 5–15)
APPEARANCE FLD: ABNORMAL
AST SERPL-CCNC: 136 U/L (ref 15–37)
AST SERPL-CCNC: 155 U/L (ref 15–37)
AST SERPL-CCNC: 156 U/L (ref 15–37)
AST SERPL-CCNC: 180 U/L (ref 15–37)
ATRIAL RATE: 83 BPM
BACTERIA SPEC CULT: NORMAL
BACTERIA SPEC CULT: NORMAL
BASOPHILS # BLD: 0 K/UL (ref 0–0.1)
BASOPHILS # BLD: 0 K/UL (ref 0–0.1)
BASOPHILS # BLD: 0.1 K/UL (ref 0–0.1)
BASOPHILS NFR BLD: 0 % (ref 0–1)
BASOPHILS NFR BLD: 0 % (ref 0–1)
BASOPHILS NFR BLD: 1 % (ref 0–1)
BILIRUB SERPL-MCNC: 3.6 MG/DL (ref 0.2–1)
BILIRUB SERPL-MCNC: 4.5 MG/DL (ref 0.2–1)
BILIRUB SERPL-MCNC: 4.8 MG/DL (ref 0.2–1)
BILIRUB SERPL-MCNC: 4.9 MG/DL (ref 0.2–1)
BUN SERPL-MCNC: 19 MG/DL (ref 6–20)
BUN SERPL-MCNC: 19 MG/DL (ref 6–20)
BUN SERPL-MCNC: 20 MG/DL (ref 6–20)
BUN SERPL-MCNC: 21 MG/DL (ref 6–20)
BUN SERPL-MCNC: 21 MG/DL (ref 6–20)
BUN SERPL-MCNC: 22 MG/DL (ref 6–20)
BUN SERPL-MCNC: 22 MG/DL (ref 6–20)
BUN SERPL-MCNC: 24 MG/DL (ref 6–20)
BUN/CREAT SERPL: 20 (ref 12–20)
BUN/CREAT SERPL: 22 (ref 12–20)
BUN/CREAT SERPL: 23 (ref 12–20)
BUN/CREAT SERPL: 24 (ref 12–20)
BUN/CREAT SERPL: 26 (ref 12–20)
BUN/CREAT SERPL: 28 (ref 12–20)
CALCIUM SERPL-MCNC: 7.8 MG/DL (ref 8.5–10.1)
CALCIUM SERPL-MCNC: 8.2 MG/DL (ref 8.5–10.1)
CALCIUM SERPL-MCNC: 8.3 MG/DL (ref 8.5–10.1)
CALCIUM SERPL-MCNC: 8.6 MG/DL (ref 8.5–10.1)
CALCIUM SERPL-MCNC: 8.7 MG/DL (ref 8.5–10.1)
CALCIUM SERPL-MCNC: 8.7 MG/DL (ref 8.5–10.1)
CALCIUM SERPL-MCNC: 8.8 MG/DL (ref 8.5–10.1)
CALCIUM SERPL-MCNC: 8.9 MG/DL (ref 8.5–10.1)
CALCULATED P AXIS, ECG09: -14 DEGREES
CALCULATED R AXIS, ECG10: -3 DEGREES
CALCULATED T AXIS, ECG11: -23 DEGREES
CHLORIDE SERPL-SCNC: 101 MMOL/L (ref 97–108)
CHLORIDE SERPL-SCNC: 102 MMOL/L (ref 97–108)
CHLORIDE SERPL-SCNC: 98 MMOL/L (ref 97–108)
CHLORIDE SERPL-SCNC: 98 MMOL/L (ref 97–108)
CHLORIDE SERPL-SCNC: 99 MMOL/L (ref 97–108)
CHLORIDE SERPL-SCNC: 99 MMOL/L (ref 97–108)
CO2 SERPL-SCNC: 22 MMOL/L (ref 21–32)
CO2 SERPL-SCNC: 24 MMOL/L (ref 21–32)
CO2 SERPL-SCNC: 24 MMOL/L (ref 21–32)
CO2 SERPL-SCNC: 25 MMOL/L (ref 21–32)
CO2 SERPL-SCNC: 26 MMOL/L (ref 21–32)
CO2 SERPL-SCNC: 28 MMOL/L (ref 21–32)
COLOR FLD: YELLOW
COMMENT, HOLDF: NORMAL
COMMENT, HOLDF: NORMAL
COVID-19 RAPID TEST, COVR: NOT DETECTED
CREAT SERPL-MCNC: 0.79 MG/DL (ref 0.55–1.02)
CREAT SERPL-MCNC: 0.79 MG/DL (ref 0.55–1.02)
CREAT SERPL-MCNC: 0.86 MG/DL (ref 0.55–1.02)
CREAT SERPL-MCNC: 0.87 MG/DL (ref 0.55–1.02)
CREAT SERPL-MCNC: 0.89 MG/DL (ref 0.55–1.02)
CREAT SERPL-MCNC: 0.89 MG/DL (ref 0.55–1.02)
CREAT SERPL-MCNC: 0.96 MG/DL (ref 0.55–1.02)
CREAT SERPL-MCNC: 1.05 MG/DL (ref 0.55–1.02)
DIAGNOSIS, 93000: NORMAL
DIFFERENTIAL METHOD BLD: ABNORMAL
EOSINOPHIL # BLD: 0.1 K/UL (ref 0–0.4)
EOSINOPHIL # BLD: 0.2 K/UL (ref 0–0.4)
EOSINOPHIL # BLD: 0.3 K/UL (ref 0–0.4)
EOSINOPHIL NFR BLD: 1 % (ref 0–7)
EOSINOPHIL NFR BLD: 2 % (ref 0–7)
EOSINOPHIL NFR BLD: 4 % (ref 0–7)
ERYTHROCYTE [DISTWIDTH] IN BLOOD BY AUTOMATED COUNT: 22.9 % (ref 11.5–14.5)
ERYTHROCYTE [DISTWIDTH] IN BLOOD BY AUTOMATED COUNT: 23 % (ref 11.5–14.5)
ERYTHROCYTE [DISTWIDTH] IN BLOOD BY AUTOMATED COUNT: 23.2 % (ref 11.5–14.5)
ERYTHROCYTE [DISTWIDTH] IN BLOOD BY AUTOMATED COUNT: 23.5 % (ref 11.5–14.5)
ERYTHROCYTE [DISTWIDTH] IN BLOOD BY AUTOMATED COUNT: 23.7 % (ref 11.5–14.5)
ERYTHROCYTE [DISTWIDTH] IN BLOOD BY AUTOMATED COUNT: 23.9 % (ref 11.5–14.5)
GLOBULIN SER CALC-MCNC: 3.8 G/DL (ref 2–4)
GLOBULIN SER CALC-MCNC: 4.1 G/DL (ref 2–4)
GLOBULIN SER CALC-MCNC: 4.2 G/DL (ref 2–4)
GLOBULIN SER CALC-MCNC: 5.9 G/DL (ref 2–4)
GLUCOSE SERPL-MCNC: 100 MG/DL (ref 65–100)
GLUCOSE SERPL-MCNC: 105 MG/DL (ref 65–100)
GLUCOSE SERPL-MCNC: 105 MG/DL (ref 65–100)
GLUCOSE SERPL-MCNC: 86 MG/DL (ref 65–100)
GLUCOSE SERPL-MCNC: 89 MG/DL (ref 65–100)
GLUCOSE SERPL-MCNC: 91 MG/DL (ref 65–100)
GLUCOSE SERPL-MCNC: 91 MG/DL (ref 65–100)
GLUCOSE SERPL-MCNC: 94 MG/DL (ref 65–100)
GRAM STN SPEC: NORMAL
GRAM STN SPEC: NORMAL
HCT VFR BLD AUTO: 30.1 % (ref 35–47)
HCT VFR BLD AUTO: 32.6 % (ref 35–47)
HCT VFR BLD AUTO: 32.7 % (ref 35–47)
HCT VFR BLD AUTO: 34 % (ref 35–47)
HCT VFR BLD AUTO: 34.7 % (ref 35–47)
HCT VFR BLD AUTO: 35 % (ref 35–47)
HCT VFR BLD AUTO: 35.4 % (ref 35–47)
HCT VFR BLD AUTO: 39.1 % (ref 35–47)
HGB BLD-MCNC: 10.3 G/DL (ref 11.5–16)
HGB BLD-MCNC: 11 G/DL (ref 11.5–16)
HGB BLD-MCNC: 11 G/DL (ref 11.5–16)
HGB BLD-MCNC: 11.7 G/DL (ref 11.5–16)
HGB BLD-MCNC: 11.9 G/DL (ref 11.5–16)
HGB BLD-MCNC: 12.2 G/DL (ref 11.5–16)
HGB BLD-MCNC: 12.2 G/DL (ref 11.5–16)
HGB BLD-MCNC: 12.9 G/DL (ref 11.5–16)
IMM GRANULOCYTES # BLD AUTO: 0 K/UL (ref 0–0.04)
IMM GRANULOCYTES # BLD AUTO: 0.1 K/UL (ref 0–0.04)
IMM GRANULOCYTES NFR BLD AUTO: 0 % (ref 0–0.5)
IMM GRANULOCYTES NFR BLD AUTO: 1 % (ref 0–0.5)
INR PPP: 1.3 (ref 0.9–1.1)
INR PPP: 1.4 (ref 0.9–1.1)
INR PPP: 1.5 (ref 0.9–1.1)
LYMPHOCYTES # BLD: 0.6 K/UL (ref 0.8–3.5)
LYMPHOCYTES # BLD: 0.8 K/UL (ref 0.8–3.5)
LYMPHOCYTES # BLD: 0.8 K/UL (ref 0.8–3.5)
LYMPHOCYTES # BLD: 0.9 K/UL (ref 0.8–3.5)
LYMPHOCYTES # BLD: 1 K/UL (ref 0.8–3.5)
LYMPHOCYTES # BLD: 1 K/UL (ref 0.8–3.5)
LYMPHOCYTES # BLD: 1.2 K/UL (ref 0.8–3.5)
LYMPHOCYTES NFR BLD: 10 % (ref 12–49)
LYMPHOCYTES NFR BLD: 11 % (ref 12–49)
LYMPHOCYTES NFR BLD: 12 % (ref 12–49)
LYMPHOCYTES NFR BLD: 8 % (ref 12–49)
LYMPHOCYTES NFR BLD: 9 % (ref 12–49)
LYMPHOCYTES NFR FLD: 34 %
MAGNESIUM SERPL-MCNC: 2 MG/DL (ref 1.6–2.4)
MAGNESIUM SERPL-MCNC: 2.2 MG/DL (ref 1.6–2.4)
MAGNESIUM SERPL-MCNC: 2.2 MG/DL (ref 1.6–2.4)
MCH RBC QN AUTO: 33 PG (ref 26–34)
MCH RBC QN AUTO: 33.2 PG (ref 26–34)
MCH RBC QN AUTO: 33.4 PG (ref 26–34)
MCH RBC QN AUTO: 33.6 PG (ref 26–34)
MCH RBC QN AUTO: 33.9 PG (ref 26–34)
MCH RBC QN AUTO: 34 PG (ref 26–34)
MCH RBC QN AUTO: 34 PG (ref 26–34)
MCH RBC QN AUTO: 34.4 PG (ref 26–34)
MCHC RBC AUTO-ENTMCNC: 33 G/DL (ref 30–36.5)
MCHC RBC AUTO-ENTMCNC: 33.6 G/DL (ref 30–36.5)
MCHC RBC AUTO-ENTMCNC: 33.7 G/DL (ref 30–36.5)
MCHC RBC AUTO-ENTMCNC: 34.2 G/DL (ref 30–36.5)
MCHC RBC AUTO-ENTMCNC: 34.3 G/DL (ref 30–36.5)
MCHC RBC AUTO-ENTMCNC: 34.4 G/DL (ref 30–36.5)
MCHC RBC AUTO-ENTMCNC: 34.5 G/DL (ref 30–36.5)
MCHC RBC AUTO-ENTMCNC: 34.9 G/DL (ref 30–36.5)
MCV RBC AUTO: 100 FL (ref 80–99)
MCV RBC AUTO: 100.6 FL (ref 80–99)
MCV RBC AUTO: 100.9 FL (ref 80–99)
MCV RBC AUTO: 95.1 FL (ref 80–99)
MCV RBC AUTO: 97.5 FL (ref 80–99)
MCV RBC AUTO: 97.7 FL (ref 80–99)
MCV RBC AUTO: 99 FL (ref 80–99)
MCV RBC AUTO: 99.7 FL (ref 80–99)
MONOCYTES # BLD: 0.7 K/UL (ref 0–1)
MONOCYTES # BLD: 0.8 K/UL (ref 0–1)
MONOCYTES # BLD: 0.8 K/UL (ref 0–1)
MONOCYTES # BLD: 0.9 K/UL (ref 0–1)
MONOCYTES # BLD: 1.1 K/UL (ref 0–1)
MONOCYTES NFR BLD: 10 % (ref 5–13)
MONOCYTES NFR BLD: 11 % (ref 5–13)
MONOCYTES NFR BLD: 11 % (ref 5–13)
MONOCYTES NFR BLD: 7 % (ref 5–13)
MONOCYTES NFR BLD: 9 % (ref 5–13)
MONOS+MACROS NFR FLD: 49 %
NEUTROPHILS NFR FLD: 17 %
NEUTS SEG # BLD: 4.6 K/UL (ref 1.8–8)
NEUTS SEG # BLD: 5.6 K/UL (ref 1.8–8)
NEUTS SEG # BLD: 5.8 K/UL (ref 1.8–8)
NEUTS SEG # BLD: 6.2 K/UL (ref 1.8–8)
NEUTS SEG # BLD: 7.6 K/UL (ref 1.8–8)
NEUTS SEG # BLD: 8 K/UL (ref 1.8–8)
NEUTS SEG # BLD: 8.8 K/UL (ref 1.8–8)
NEUTS SEG NFR BLD: 73 % (ref 32–75)
NEUTS SEG NFR BLD: 74 % (ref 32–75)
NEUTS SEG NFR BLD: 77 % (ref 32–75)
NEUTS SEG NFR BLD: 81 % (ref 32–75)
NEUTS SEG NFR BLD: 83 % (ref 32–75)
NRBC # BLD: 0 K/UL (ref 0–0.01)
NRBC BLD-RTO: 0 PER 100 WBC
NUC CELL # FLD: 43 /CU MM
OSMOLALITY SERPL: 287 MOSM/KG H2O
OSMOLALITY UR: 510 MOSM/KG H2O
P-R INTERVAL, ECG05: 126 MS
PHOSPHATE SERPL-MCNC: 2.7 MG/DL (ref 2.6–4.7)
PHOSPHATE SERPL-MCNC: 2.8 MG/DL (ref 2.6–4.7)
PHOSPHATE SERPL-MCNC: 2.8 MG/DL (ref 2.6–4.7)
PLATELET # BLD AUTO: 105 K/UL (ref 150–400)
PLATELET # BLD AUTO: 128 K/UL (ref 150–400)
PLATELET # BLD AUTO: 68 K/UL (ref 150–400)
PLATELET # BLD AUTO: 77 K/UL (ref 150–400)
PLATELET # BLD AUTO: 81 K/UL (ref 150–400)
PLATELET # BLD AUTO: 85 K/UL (ref 150–400)
PLATELET # BLD AUTO: 87 K/UL (ref 150–400)
PLATELET # BLD AUTO: 91 K/UL (ref 150–400)
PLATELET COMMENTS,PCOM: ABNORMAL
PMV BLD AUTO: 10.5 FL (ref 8.9–12.9)
PMV BLD AUTO: 10.9 FL (ref 8.9–12.9)
PMV BLD AUTO: 11 FL (ref 8.9–12.9)
PMV BLD AUTO: 11 FL (ref 8.9–12.9)
PMV BLD AUTO: 11.4 FL (ref 8.9–12.9)
PMV BLD AUTO: 11.5 FL (ref 8.9–12.9)
PMV BLD AUTO: 11.9 FL (ref 8.9–12.9)
PMV BLD AUTO: 12.2 FL (ref 8.9–12.9)
POTASSIUM SERPL-SCNC: 3.7 MMOL/L (ref 3.5–5.1)
POTASSIUM SERPL-SCNC: 3.7 MMOL/L (ref 3.5–5.1)
POTASSIUM SERPL-SCNC: 3.9 MMOL/L (ref 3.5–5.1)
POTASSIUM SERPL-SCNC: 4.1 MMOL/L (ref 3.5–5.1)
POTASSIUM SERPL-SCNC: 4.1 MMOL/L (ref 3.5–5.1)
POTASSIUM SERPL-SCNC: 4.2 MMOL/L (ref 3.5–5.1)
POTASSIUM SERPL-SCNC: 4.5 MMOL/L (ref 3.5–5.1)
POTASSIUM SERPL-SCNC: 4.6 MMOL/L (ref 3.5–5.1)
PROT FLD-MCNC: 1 G/DL
PROT SERPL-MCNC: 6.2 G/DL (ref 6.4–8.2)
PROT SERPL-MCNC: 7.3 G/DL (ref 6.4–8.2)
PROT SERPL-MCNC: 7.3 G/DL (ref 6.4–8.2)
PROT SERPL-MCNC: 8.7 G/DL (ref 6.4–8.2)
PROTHROMBIN TIME: 13.5 SEC (ref 9–11.1)
PROTHROMBIN TIME: 14.2 SEC (ref 9–11.1)
PROTHROMBIN TIME: 14.4 SEC (ref 9–11.1)
PROTHROMBIN TIME: 14.6 SEC (ref 9–11.1)
PROTHROMBIN TIME: 14.7 SEC (ref 9–11.1)
PROTHROMBIN TIME: 14.8 SEC (ref 9–11.1)
PROTHROMBIN TIME: 15.8 SEC (ref 9–11.1)
Q-T INTERVAL, ECG07: 420 MS
QRS DURATION, ECG06: 78 MS
QTC CALCULATION (BEZET), ECG08: 493 MS
RBC # BLD AUTO: 3.04 M/UL (ref 3.8–5.2)
RBC # BLD AUTO: 3.24 M/UL (ref 3.8–5.2)
RBC # BLD AUTO: 3.24 M/UL (ref 3.8–5.2)
RBC # BLD AUTO: 3.48 M/UL (ref 3.8–5.2)
RBC # BLD AUTO: 3.55 M/UL (ref 3.8–5.2)
RBC # BLD AUTO: 3.56 M/UL (ref 3.8–5.2)
RBC # BLD AUTO: 3.68 M/UL (ref 3.8–5.2)
RBC # BLD AUTO: 3.91 M/UL (ref 3.8–5.2)
RBC # FLD: >100 /CU MM
RBC MORPH BLD: ABNORMAL
SAMPLES BEING HELD,HOLD: NORMAL
SAMPLES BEING HELD,HOLD: NORMAL
SERVICE CMNT-IMP: NORMAL
SODIUM SERPL-SCNC: 128 MMOL/L (ref 136–145)
SODIUM SERPL-SCNC: 130 MMOL/L (ref 136–145)
SODIUM SERPL-SCNC: 131 MMOL/L (ref 136–145)
SODIUM SERPL-SCNC: 132 MMOL/L (ref 136–145)
SODIUM SERPL-SCNC: 133 MMOL/L (ref 136–145)
SODIUM SERPL-SCNC: 133 MMOL/L (ref 136–145)
SODIUM UR-SCNC: 23 MMOL/L
SOURCE, COVRS: NORMAL
SPECIMEN SOURCE FLD: ABNORMAL
SPECIMEN SOURCE FLD: NORMAL
SPECIMEN SOURCE FLD: NORMAL
URATE SERPL-MCNC: 6.6 MG/DL (ref 2.6–6)
VENTRICULAR RATE, ECG03: 83 BPM
WBC # BLD AUTO: 10.5 K/UL (ref 3.6–11)
WBC # BLD AUTO: 10.5 K/UL (ref 3.6–11)
WBC # BLD AUTO: 6.4 K/UL (ref 3.6–11)
WBC # BLD AUTO: 7.6 K/UL (ref 3.6–11)
WBC # BLD AUTO: 7.8 K/UL (ref 3.6–11)
WBC # BLD AUTO: 8.1 K/UL (ref 3.6–11)
WBC # BLD AUTO: 8.5 K/UL (ref 3.6–11)
WBC # BLD AUTO: 9.4 K/UL (ref 3.6–11)

## 2022-01-01 PROCEDURE — 83735 ASSAY OF MAGNESIUM: CPT

## 2022-01-01 PROCEDURE — 85610 PROTHROMBIN TIME: CPT

## 2022-01-01 PROCEDURE — 77010033678 HC OXYGEN DAILY

## 2022-01-01 PROCEDURE — 65270000015 HC RM PRIVATE ONCOLOGY

## 2022-01-01 PROCEDURE — P9047 ALBUMIN (HUMAN), 25%, 50ML: HCPCS

## 2022-01-01 PROCEDURE — HOSPICE MEDICATION HC HH HOSPICE MEDICATION

## 2022-01-01 PROCEDURE — 74011250636 HC RX REV CODE- 250/636: Performed by: PHYSICIAN ASSISTANT

## 2022-01-01 PROCEDURE — 84100 ASSAY OF PHOSPHORUS: CPT

## 2022-01-01 PROCEDURE — 3331090004 HSPC SERVICE INTENSITY ADD-ON

## 2022-01-01 PROCEDURE — G0156 HHCP-SVS OF AIDE,EA 15 MIN: HCPCS

## 2022-01-01 PROCEDURE — 94640 AIRWAY INHALATION TREATMENT: CPT

## 2022-01-01 PROCEDURE — 82140 ASSAY OF AMMONIA: CPT

## 2022-01-01 PROCEDURE — 0651 HSPC ROUTINE HOME CARE

## 2022-01-01 PROCEDURE — 36415 COLL VENOUS BLD VENIPUNCTURE: CPT

## 2022-01-01 PROCEDURE — 97166 OT EVAL MOD COMPLEX 45 MIN: CPT

## 2022-01-01 PROCEDURE — G0299 HHS/HOSPICE OF RN EA 15 MIN: HCPCS

## 2022-01-01 PROCEDURE — P9047 ALBUMIN (HUMAN), 25%, 50ML: HCPCS | Performed by: NURSE PRACTITIONER

## 2022-01-01 PROCEDURE — 74011250637 HC RX REV CODE- 250/637: Performed by: PHYSICIAN ASSISTANT

## 2022-01-01 PROCEDURE — 74011000250 HC RX REV CODE- 250: Performed by: HOSPITALIST

## 2022-01-01 PROCEDURE — 80053 COMPREHEN METABOLIC PANEL: CPT

## 2022-01-01 PROCEDURE — 77030029684 HC NEB SM VOL KT MONA -A

## 2022-01-01 PROCEDURE — 74011250636 HC RX REV CODE- 250/636

## 2022-01-01 PROCEDURE — 82105 ALPHA-FETOPROTEIN SERUM: CPT

## 2022-01-01 PROCEDURE — 74011250636 HC RX REV CODE- 250/636: Performed by: NURSE PRACTITIONER

## 2022-01-01 PROCEDURE — 85025 COMPLETE CBC W/AUTO DIFF WBC: CPT

## 2022-01-01 PROCEDURE — 99285 EMERGENCY DEPT VISIT HI MDM: CPT

## 2022-01-01 PROCEDURE — 74011250636 HC RX REV CODE- 250/636: Performed by: RADIOLOGY

## 2022-01-01 PROCEDURE — 74011250637 HC RX REV CODE- 250/637: Performed by: SPECIALIST

## 2022-01-01 PROCEDURE — 84550 ASSAY OF BLOOD/URIC ACID: CPT

## 2022-01-01 PROCEDURE — 97530 THERAPEUTIC ACTIVITIES: CPT

## 2022-01-01 PROCEDURE — 96366 THER/PROPH/DIAG IV INF ADDON: CPT

## 2022-01-01 PROCEDURE — 99223 1ST HOSP IP/OBS HIGH 75: CPT | Performed by: INTERNAL MEDICINE

## 2022-01-01 PROCEDURE — P9047 ALBUMIN (HUMAN), 25%, 50ML: HCPCS | Performed by: STUDENT IN AN ORGANIZED HEALTH CARE EDUCATION/TRAINING PROGRAM

## 2022-01-01 PROCEDURE — 85027 COMPLETE CBC AUTOMATED: CPT

## 2022-01-01 PROCEDURE — 93005 ELECTROCARDIOGRAM TRACING: CPT

## 2022-01-01 PROCEDURE — C1729 CATH, DRAINAGE: HCPCS

## 2022-01-01 PROCEDURE — 96374 THER/PROPH/DIAG INJ IV PUSH: CPT

## 2022-01-01 PROCEDURE — 74011250637 HC RX REV CODE- 250/637: Performed by: HOSPITALIST

## 2022-01-01 PROCEDURE — P9045 ALBUMIN (HUMAN), 5%, 250 ML: HCPCS | Performed by: PHYSICIAN ASSISTANT

## 2022-01-01 PROCEDURE — 96365 THER/PROPH/DIAG IV INF INIT: CPT

## 2022-01-01 PROCEDURE — 87205 SMEAR GRAM STAIN: CPT

## 2022-01-01 PROCEDURE — 83930 ASSAY OF BLOOD OSMOLALITY: CPT

## 2022-01-01 PROCEDURE — 3336500001 HSPC ELECTION

## 2022-01-01 PROCEDURE — 74011250636 HC RX REV CODE- 250/636: Performed by: EMERGENCY MEDICINE

## 2022-01-01 PROCEDURE — 80048 BASIC METABOLIC PNL TOTAL CA: CPT

## 2022-01-01 PROCEDURE — 83935 ASSAY OF URINE OSMOLALITY: CPT

## 2022-01-01 PROCEDURE — 84157 ASSAY OF PROTEIN OTHER: CPT

## 2022-01-01 PROCEDURE — 97161 PT EVAL LOW COMPLEX 20 MIN: CPT

## 2022-01-01 PROCEDURE — 74011000250 HC RX REV CODE- 250

## 2022-01-01 PROCEDURE — 89050 BODY FLUID CELL COUNT: CPT

## 2022-01-01 PROCEDURE — 0W9G3ZZ DRAINAGE OF PERITONEAL CAVITY, PERCUTANEOUS APPROACH: ICD-10-PCS | Performed by: STUDENT IN AN ORGANIZED HEALTH CARE EDUCATION/TRAINING PROGRAM

## 2022-01-01 PROCEDURE — G0155 HHCP-SVS OF CSW,EA 15 MIN: HCPCS

## 2022-01-01 PROCEDURE — P9047 ALBUMIN (HUMAN), 25%, 50ML: HCPCS | Performed by: RADIOLOGY

## 2022-01-01 PROCEDURE — 74011250636 HC RX REV CODE- 250/636: Performed by: HOSPITALIST

## 2022-01-01 PROCEDURE — 77030028236 US GUIDE PARACENTESIS

## 2022-01-01 PROCEDURE — 87635 SARS-COV-2 COVID-19 AMP PRB: CPT

## 2022-01-01 PROCEDURE — 74177 CT ABD & PELVIS W/CONTRAST: CPT

## 2022-01-01 PROCEDURE — 74011000636 HC RX REV CODE- 636: Performed by: EMERGENCY MEDICINE

## 2022-01-01 PROCEDURE — 74011250636 HC RX REV CODE- 250/636: Performed by: STUDENT IN AN ORGANIZED HEALTH CARE EDUCATION/TRAINING PROGRAM

## 2022-01-01 PROCEDURE — 99284 EMERGENCY DEPT VISIT MOD MDM: CPT

## 2022-01-01 PROCEDURE — 84300 ASSAY OF URINE SODIUM: CPT

## 2022-01-01 PROCEDURE — 99233 SBSQ HOSP IP/OBS HIGH 50: CPT | Performed by: INTERNAL MEDICINE

## 2022-01-01 PROCEDURE — 51798 US URINE CAPACITY MEASURE: CPT

## 2022-01-01 PROCEDURE — 82042 OTHER SOURCE ALBUMIN QUAN EA: CPT

## 2022-01-01 PROCEDURE — 0W9G3ZZ DRAINAGE OF PERITONEAL CAVITY, PERCUTANEOUS APPROACH: ICD-10-PCS | Performed by: RADIOLOGY

## 2022-01-01 RX ORDER — HEPARIN SODIUM 200 [USP'U]/100ML
400 INJECTION, SOLUTION INTRAVENOUS ONCE
Status: ACTIVE | OUTPATIENT
Start: 2022-01-01 | End: 2022-01-01

## 2022-01-01 RX ORDER — LEVOTHYROXINE SODIUM 112 UG/1
112 TABLET ORAL
Status: ON HOLD | COMMUNITY
End: 2022-01-01 | Stop reason: SDUPTHER

## 2022-01-01 RX ORDER — ALBUMIN HUMAN 250 G/1000ML
12.5 SOLUTION INTRAVENOUS EVERY 6 HOURS
Status: COMPLETED | OUTPATIENT
Start: 2022-01-01 | End: 2022-01-01

## 2022-01-01 RX ORDER — LIDOCAINE HYDROCHLORIDE 10 MG/ML
10 INJECTION, SOLUTION EPIDURAL; INFILTRATION; INTRACAUDAL; PERINEURAL
Status: COMPLETED | OUTPATIENT
Start: 2022-01-01 | End: 2022-01-01

## 2022-01-01 RX ORDER — ALBUMIN HUMAN 250 G/1000ML
25 SOLUTION INTRAVENOUS ONCE
Status: COMPLETED | OUTPATIENT
Start: 2022-01-01 | End: 2022-01-01

## 2022-01-01 RX ORDER — BUDESONIDE 0.25 MG/2ML
250 INHALANT ORAL
Status: DISCONTINUED | OUTPATIENT
Start: 2022-01-01 | End: 2022-01-01 | Stop reason: HOSPADM

## 2022-01-01 RX ORDER — FUROSEMIDE 40 MG/1
40 TABLET ORAL 2 TIMES DAILY
Qty: 60 TABLET | Refills: 0 | Status: SHIPPED
Start: 2022-01-01

## 2022-01-01 RX ORDER — LACTULOSE 10 G/15ML
20 SOLUTION ORAL; RECTAL
Status: ON HOLD | COMMUNITY
End: 2022-01-01 | Stop reason: SDUPTHER

## 2022-01-01 RX ORDER — ONDANSETRON 4 MG/1
4 TABLET, ORALLY DISINTEGRATING ORAL
Status: DISCONTINUED | OUTPATIENT
Start: 2022-01-01 | End: 2022-01-01 | Stop reason: HOSPADM

## 2022-01-01 RX ORDER — SERTRALINE HYDROCHLORIDE 50 MG/1
50 TABLET, FILM COATED ORAL DAILY
Status: DISCONTINUED | OUTPATIENT
Start: 2022-01-01 | End: 2022-01-01 | Stop reason: HOSPADM

## 2022-01-01 RX ORDER — FACIAL-BODY WIPES
10 EACH TOPICAL
Status: DISCONTINUED | OUTPATIENT
Start: 2022-01-01 | End: 2022-01-01 | Stop reason: HOSPADM

## 2022-01-01 RX ORDER — LIDOCAINE HYDROCHLORIDE 10 MG/ML
INJECTION, SOLUTION EPIDURAL; INFILTRATION; INTRACAUDAL; PERINEURAL
Status: COMPLETED
Start: 2022-01-01 | End: 2022-01-01

## 2022-01-01 RX ORDER — FENTANYL CITRATE 50 UG/ML
25-100 INJECTION, SOLUTION INTRAMUSCULAR; INTRAVENOUS
Status: DISCONTINUED | OUTPATIENT
Start: 2022-01-01 | End: 2022-01-01 | Stop reason: HOSPADM

## 2022-01-01 RX ORDER — LIDOCAINE HYDROCHLORIDE 10 MG/ML
10 INJECTION, SOLUTION EPIDURAL; INFILTRATION; INTRACAUDAL; PERINEURAL
Status: ACTIVE | OUTPATIENT
Start: 2022-01-01 | End: 2022-01-01

## 2022-01-01 RX ORDER — POLYETHYLENE GLYCOL 3350 17 G/17G
17 POWDER, FOR SOLUTION ORAL
Status: ON HOLD | COMMUNITY
End: 2022-01-01

## 2022-01-01 RX ORDER — SPIRONOLACTONE 25 MG/1
50 TABLET ORAL DAILY
Status: DISCONTINUED | OUTPATIENT
Start: 2022-01-01 | End: 2022-01-01 | Stop reason: HOSPADM

## 2022-01-01 RX ORDER — POLYETHYLENE GLYCOL 3350 17 G/17G
17 POWDER, FOR SOLUTION ORAL
Status: DISCONTINUED | OUTPATIENT
Start: 2022-01-01 | End: 2022-01-01 | Stop reason: HOSPADM

## 2022-01-01 RX ORDER — MAGNESIUM HYDROXIDE 1200 MG/15ML
1 LIQUID ORAL
COMMUNITY
End: 2022-01-01

## 2022-01-01 RX ORDER — ASPIRIN 81 MG/1
81 TABLET ORAL DAILY
Status: DISCONTINUED | OUTPATIENT
Start: 2022-01-01 | End: 2022-01-01 | Stop reason: HOSPADM

## 2022-01-01 RX ORDER — LIDOCAINE HYDROCHLORIDE 20 MG/ML
18 INJECTION, SOLUTION INFILTRATION; PERINEURAL ONCE
Status: ACTIVE | OUTPATIENT
Start: 2022-01-01 | End: 2022-01-01

## 2022-01-01 RX ORDER — POTASSIUM CHLORIDE 20 MEQ/1
20 TABLET, EXTENDED RELEASE ORAL DAILY
Status: DISCONTINUED | OUTPATIENT
Start: 2022-01-01 | End: 2022-01-01 | Stop reason: HOSPADM

## 2022-01-01 RX ORDER — ALBUMIN HUMAN 250 G/1000ML
50 SOLUTION INTRAVENOUS
Status: DISCONTINUED | OUTPATIENT
Start: 2022-01-01 | End: 2022-01-01

## 2022-01-01 RX ORDER — ONDANSETRON 4 MG/1
4 TABLET, FILM COATED ORAL
Status: ON HOLD | COMMUNITY
End: 2022-01-01 | Stop reason: SDUPTHER

## 2022-01-01 RX ORDER — CYANOCOBALAMIN 1000 UG/ML
1000 INJECTION, SOLUTION INTRAMUSCULAR; SUBCUTANEOUS
Status: DISCONTINUED | OUTPATIENT
Start: 2022-01-01 | End: 2022-01-01 | Stop reason: HOSPADM

## 2022-01-01 RX ORDER — LEVOTHYROXINE SODIUM 112 UG/1
112 TABLET ORAL
Status: DISCONTINUED | OUTPATIENT
Start: 2022-01-01 | End: 2022-01-01 | Stop reason: HOSPADM

## 2022-01-01 RX ORDER — BUDESONIDE 0.25 MG/2ML
250 INHALANT ORAL 2 TIMES DAILY
Status: DISCONTINUED | OUTPATIENT
Start: 2022-01-01 | End: 2022-01-01

## 2022-01-01 RX ORDER — FUROSEMIDE 40 MG/1
40 TABLET ORAL 2 TIMES DAILY
Status: DISCONTINUED | OUTPATIENT
Start: 2022-01-01 | End: 2022-01-01 | Stop reason: HOSPADM

## 2022-01-01 RX ORDER — ADHESIVE BANDAGE
30 BANDAGE TOPICAL DAILY PRN
Status: DISCONTINUED | OUTPATIENT
Start: 2022-01-01 | End: 2022-01-01 | Stop reason: HOSPADM

## 2022-01-01 RX ORDER — CYANOCOBALAMIN 1000 UG/ML
1000 INJECTION, SOLUTION INTRAMUSCULAR; SUBCUTANEOUS
Status: DISCONTINUED | OUTPATIENT
Start: 2022-01-01 | End: 2022-01-01

## 2022-01-01 RX ORDER — POTASSIUM CHLORIDE 1125 MG/1
20 TABLET, EXTENDED RELEASE ORAL DAILY
Status: ON HOLD | COMMUNITY
End: 2022-01-01

## 2022-01-01 RX ORDER — LIDOCAINE HYDROCHLORIDE 20 MG/ML
20 INJECTION, SOLUTION INFILTRATION; PERINEURAL ONCE
Status: DISPENSED | OUTPATIENT
Start: 2022-01-01 | End: 2022-01-01

## 2022-01-01 RX ORDER — HEPARIN SODIUM 200 [USP'U]/100ML
400 INJECTION, SOLUTION INTRAVENOUS ONCE
Status: DISPENSED | OUTPATIENT
Start: 2022-01-01 | End: 2022-01-01

## 2022-01-01 RX ORDER — ADHESIVE BANDAGE
30 BANDAGE TOPICAL DAILY PRN
Status: ON HOLD | COMMUNITY
End: 2022-01-01

## 2022-01-01 RX ORDER — FACIAL-BODY WIPES
10 EACH TOPICAL
Status: ON HOLD | COMMUNITY
End: 2022-01-01 | Stop reason: SDUPTHER

## 2022-01-01 RX ORDER — ALBUMIN HUMAN 50 G/1000ML
25 SOLUTION INTRAVENOUS ONCE
Status: COMPLETED | OUTPATIENT
Start: 2022-01-01 | End: 2022-01-01

## 2022-01-01 RX ORDER — FUROSEMIDE 20 MG/1
20 TABLET ORAL DAILY
Status: ON HOLD | COMMUNITY
End: 2022-01-01 | Stop reason: SDUPTHER

## 2022-01-01 RX ADMIN — RIFAXIMIN 550 MG: 550 TABLET ORAL at 22:29

## 2022-01-01 RX ADMIN — BUDESONIDE 250 MCG: 0.25 INHALANT RESPIRATORY (INHALATION) at 07:25

## 2022-01-01 RX ADMIN — BUDESONIDE 250 MCG: 0.25 INHALANT RESPIRATORY (INHALATION) at 22:31

## 2022-01-01 RX ADMIN — FUROSEMIDE 40 MG: 40 TABLET ORAL at 17:19

## 2022-01-01 RX ADMIN — BUDESONIDE 250 MCG: 0.25 INHALANT RESPIRATORY (INHALATION) at 19:33

## 2022-01-01 RX ADMIN — LEVOTHYROXINE SODIUM 112 MCG: 0.11 TABLET ORAL at 09:44

## 2022-01-01 RX ADMIN — BUDESONIDE 250 MCG: 0.25 INHALANT RESPIRATORY (INHALATION) at 09:31

## 2022-01-01 RX ADMIN — ASPIRIN 81 MG: 81 TABLET, COATED ORAL at 10:26

## 2022-01-01 RX ADMIN — FUROSEMIDE 40 MG: 40 TABLET ORAL at 10:35

## 2022-01-01 RX ADMIN — SPIRONOLACTONE 50 MG: 25 TABLET ORAL at 08:32

## 2022-01-01 RX ADMIN — POTASSIUM CHLORIDE 20 MEQ: 20 TABLET, EXTENDED RELEASE ORAL at 18:41

## 2022-01-01 RX ADMIN — SERTRALINE 50 MG: 50 TABLET, FILM COATED ORAL at 09:44

## 2022-01-01 RX ADMIN — RIFAXIMIN 550 MG: 550 TABLET ORAL at 08:54

## 2022-01-01 RX ADMIN — RIFAXIMIN 550 MG: 550 TABLET ORAL at 22:09

## 2022-01-01 RX ADMIN — SERTRALINE 50 MG: 50 TABLET, FILM COATED ORAL at 08:55

## 2022-01-01 RX ADMIN — ASPIRIN 81 MG: 81 TABLET, COATED ORAL at 09:44

## 2022-01-01 RX ADMIN — FUROSEMIDE 40 MG: 40 TABLET ORAL at 17:39

## 2022-01-01 RX ADMIN — FUROSEMIDE 40 MG: 40 TABLET ORAL at 10:26

## 2022-01-01 RX ADMIN — LEVOTHYROXINE SODIUM 112 MCG: 0.11 TABLET ORAL at 05:41

## 2022-01-01 RX ADMIN — BUDESONIDE 250 MCG: 0.25 INHALANT RESPIRATORY (INHALATION) at 21:34

## 2022-01-01 RX ADMIN — RIFAXIMIN 550 MG: 550 TABLET ORAL at 08:43

## 2022-01-01 RX ADMIN — RIFAXIMIN 550 MG: 550 TABLET ORAL at 10:26

## 2022-01-01 RX ADMIN — SERTRALINE 50 MG: 50 TABLET, FILM COATED ORAL at 08:43

## 2022-01-01 RX ADMIN — LEVOTHYROXINE SODIUM 112 MCG: 0.11 TABLET ORAL at 08:43

## 2022-01-01 RX ADMIN — POTASSIUM CHLORIDE 20 MEQ: 20 TABLET, EXTENDED RELEASE ORAL at 10:34

## 2022-01-01 RX ADMIN — SPIRONOLACTONE 50 MG: 25 TABLET ORAL at 08:45

## 2022-01-01 RX ADMIN — SERTRALINE 50 MG: 50 TABLET, FILM COATED ORAL at 10:27

## 2022-01-01 RX ADMIN — RIFAXIMIN 550 MG: 550 TABLET ORAL at 21:00

## 2022-01-01 RX ADMIN — ASPIRIN 81 MG: 81 TABLET, COATED ORAL at 08:32

## 2022-01-01 RX ADMIN — ALBUMIN (HUMAN) 12.5 G: 0.25 INJECTION, SOLUTION INTRAVENOUS at 18:20

## 2022-01-01 RX ADMIN — SODIUM CHLORIDE 500 ML: 9 INJECTION, SOLUTION INTRAVENOUS at 09:47

## 2022-01-01 RX ADMIN — FUROSEMIDE 40 MG: 40 TABLET ORAL at 08:57

## 2022-01-01 RX ADMIN — BUDESONIDE 250 MCG: 0.25 INHALANT RESPIRATORY (INHALATION) at 19:10

## 2022-01-01 RX ADMIN — RIFAXIMIN 550 MG: 550 TABLET ORAL at 20:48

## 2022-01-01 RX ADMIN — ALBUMIN (HUMAN) 12.5 G: 0.25 INJECTION, SOLUTION INTRAVENOUS at 23:28

## 2022-01-01 RX ADMIN — POTASSIUM CHLORIDE 20 MEQ: 20 TABLET, EXTENDED RELEASE ORAL at 08:57

## 2022-01-01 RX ADMIN — ALBUMIN (HUMAN) 12.5 G: 0.25 INJECTION, SOLUTION INTRAVENOUS at 18:10

## 2022-01-01 RX ADMIN — LIDOCAINE HYDROCHLORIDE 10 ML: 10 INJECTION, SOLUTION EPIDURAL; INFILTRATION; INTRACAUDAL; PERINEURAL at 13:00

## 2022-01-01 RX ADMIN — SERTRALINE 50 MG: 50 TABLET, FILM COATED ORAL at 08:45

## 2022-01-01 RX ADMIN — SPIRONOLACTONE 50 MG: 25 TABLET ORAL at 10:26

## 2022-01-01 RX ADMIN — RIFAXIMIN 550 MG: 550 TABLET ORAL at 21:14

## 2022-01-01 RX ADMIN — SERTRALINE 50 MG: 50 TABLET, FILM COATED ORAL at 08:32

## 2022-01-01 RX ADMIN — BUDESONIDE 250 MCG: 0.25 INHALANT RESPIRATORY (INHALATION) at 07:52

## 2022-01-01 RX ADMIN — BUDESONIDE 250 MCG: 0.25 INHALANT RESPIRATORY (INHALATION) at 22:40

## 2022-01-01 RX ADMIN — ASPIRIN 81 MG: 81 TABLET, COATED ORAL at 10:34

## 2022-01-01 RX ADMIN — SPIRONOLACTONE 50 MG: 25 TABLET ORAL at 10:34

## 2022-01-01 RX ADMIN — BUDESONIDE 250 MCG: 0.25 INHALANT RESPIRATORY (INHALATION) at 21:00

## 2022-01-01 RX ADMIN — ALBUMIN (HUMAN) 12.5 G: 0.25 INJECTION, SOLUTION INTRAVENOUS at 08:28

## 2022-01-01 RX ADMIN — FUROSEMIDE 40 MG: 40 TABLET ORAL at 08:55

## 2022-01-01 RX ADMIN — BUDESONIDE 250 MCG: 0.25 INHALANT RESPIRATORY (INHALATION) at 08:28

## 2022-01-01 RX ADMIN — FUROSEMIDE 40 MG: 40 TABLET ORAL at 17:58

## 2022-01-01 RX ADMIN — ASPIRIN 81 MG: 81 TABLET, COATED ORAL at 08:45

## 2022-01-01 RX ADMIN — LEVOTHYROXINE SODIUM 112 MCG: 0.11 TABLET ORAL at 10:26

## 2022-01-01 RX ADMIN — FUROSEMIDE 40 MG: 40 TABLET ORAL at 18:10

## 2022-01-01 RX ADMIN — BUDESONIDE 250 MCG: 0.25 INHALANT RESPIRATORY (INHALATION) at 07:23

## 2022-01-01 RX ADMIN — SERTRALINE 50 MG: 50 TABLET, FILM COATED ORAL at 10:34

## 2022-01-01 RX ADMIN — POTASSIUM CHLORIDE 20 MEQ: 20 TABLET, EXTENDED RELEASE ORAL at 08:32

## 2022-01-01 RX ADMIN — RIFAXIMIN 550 MG: 550 TABLET ORAL at 08:57

## 2022-01-01 RX ADMIN — RIFAXIMIN 550 MG: 550 TABLET ORAL at 20:51

## 2022-01-01 RX ADMIN — SPIRONOLACTONE 50 MG: 25 TABLET ORAL at 09:44

## 2022-01-01 RX ADMIN — RIFAXIMIN 550 MG: 550 TABLET ORAL at 08:32

## 2022-01-01 RX ADMIN — RIFAXIMIN 550 MG: 550 TABLET ORAL at 20:31

## 2022-01-01 RX ADMIN — IOPAMIDOL 100 ML: 755 INJECTION, SOLUTION INTRAVENOUS at 10:00

## 2022-01-01 RX ADMIN — ALBUMIN (HUMAN) 12.5 G: 0.25 INJECTION, SOLUTION INTRAVENOUS at 13:15

## 2022-01-01 RX ADMIN — FUROSEMIDE 40 MG: 40 TABLET ORAL at 18:29

## 2022-01-01 RX ADMIN — ALBUMIN (HUMAN) 12.5 G: 0.25 INJECTION, SOLUTION INTRAVENOUS at 23:54

## 2022-01-01 RX ADMIN — POTASSIUM CHLORIDE 20 MEQ: 20 TABLET, EXTENDED RELEASE ORAL at 08:54

## 2022-01-01 RX ADMIN — LEVOTHYROXINE SODIUM 112 MCG: 0.11 TABLET ORAL at 10:34

## 2022-01-01 RX ADMIN — ASPIRIN 81 MG: 81 TABLET, COATED ORAL at 08:43

## 2022-01-01 RX ADMIN — RIFAXIMIN 550 MG: 550 TABLET ORAL at 08:45

## 2022-01-01 RX ADMIN — RIFAXIMIN 550 MG: 550 TABLET ORAL at 09:44

## 2022-01-01 RX ADMIN — FUROSEMIDE 40 MG: 40 TABLET ORAL at 18:16

## 2022-01-01 RX ADMIN — BUDESONIDE 250 MCG: 0.25 INHALANT RESPIRATORY (INHALATION) at 08:13

## 2022-01-01 RX ADMIN — FUROSEMIDE 40 MG: 40 TABLET ORAL at 18:50

## 2022-01-01 RX ADMIN — FUROSEMIDE 40 MG: 40 TABLET ORAL at 08:43

## 2022-01-01 RX ADMIN — CYANOCOBALAMIN 1000 MCG: 1000 INJECTION, SOLUTION INTRAMUSCULAR; SUBCUTANEOUS at 13:38

## 2022-01-01 RX ADMIN — ASPIRIN 81 MG: 81 TABLET, COATED ORAL at 08:54

## 2022-01-01 RX ADMIN — SPIRONOLACTONE 50 MG: 25 TABLET ORAL at 08:57

## 2022-01-01 RX ADMIN — SPIRONOLACTONE 50 MG: 25 TABLET ORAL at 08:43

## 2022-01-01 RX ADMIN — LIDOCAINE HYDROCHLORIDE 10 ML: 10 INJECTION, SOLUTION EPIDURAL; INFILTRATION; INTRACAUDAL; PERINEURAL at 16:00

## 2022-01-01 RX ADMIN — POTASSIUM CHLORIDE 20 MEQ: 20 TABLET, EXTENDED RELEASE ORAL at 08:43

## 2022-01-01 RX ADMIN — POTASSIUM CHLORIDE 20 MEQ: 20 TABLET, EXTENDED RELEASE ORAL at 08:45

## 2022-01-01 RX ADMIN — FUROSEMIDE 40 MG: 40 TABLET ORAL at 08:32

## 2022-01-01 RX ADMIN — FUROSEMIDE 40 MG: 40 TABLET ORAL at 09:44

## 2022-01-01 RX ADMIN — LEVOTHYROXINE SODIUM 112 MCG: 0.11 TABLET ORAL at 08:57

## 2022-01-01 RX ADMIN — ASPIRIN 81 MG: 81 TABLET, COATED ORAL at 08:57

## 2022-01-01 RX ADMIN — RIFAXIMIN 550 MG: 550 TABLET ORAL at 21:58

## 2022-01-01 RX ADMIN — FUROSEMIDE 40 MG: 40 TABLET ORAL at 17:18

## 2022-01-01 RX ADMIN — BUDESONIDE 250 MCG: 0.25 INHALANT RESPIRATORY (INHALATION) at 23:07

## 2022-01-01 RX ADMIN — BUDESONIDE 250 MCG: 0.25 INHALANT RESPIRATORY (INHALATION) at 08:59

## 2022-01-01 RX ADMIN — POTASSIUM CHLORIDE 20 MEQ: 20 TABLET, EXTENDED RELEASE ORAL at 10:26

## 2022-01-01 RX ADMIN — BUDESONIDE 250 MCG: 0.25 INHALANT RESPIRATORY (INHALATION) at 07:17

## 2022-01-01 RX ADMIN — POTASSIUM CHLORIDE 20 MEQ: 20 TABLET, EXTENDED RELEASE ORAL at 09:44

## 2022-01-01 RX ADMIN — RIFAXIMIN 550 MG: 550 TABLET ORAL at 10:34

## 2022-01-01 RX ADMIN — ALBUMIN (HUMAN) 25 G: 12.5 INJECTION, SOLUTION INTRAVENOUS at 12:55

## 2022-01-01 RX ADMIN — LEVOTHYROXINE SODIUM 112 MCG: 0.11 TABLET ORAL at 08:32

## 2022-01-01 RX ADMIN — ALBUMIN (HUMAN) 12.5 G: 0.25 INJECTION, SOLUTION INTRAVENOUS at 13:36

## 2022-01-01 RX ADMIN — FUROSEMIDE 40 MG: 40 TABLET ORAL at 08:45

## 2022-01-01 RX ADMIN — ALBUMIN (HUMAN) 25 G: 0.25 INJECTION, SOLUTION INTRAVENOUS at 15:45

## 2022-01-01 RX ADMIN — RIFAXIMIN 550 MG: 550 TABLET ORAL at 21:48

## 2022-01-01 RX ADMIN — SPIRONOLACTONE 50 MG: 25 TABLET ORAL at 08:54

## 2022-01-01 RX ADMIN — FUROSEMIDE 40 MG: 40 TABLET ORAL at 18:40

## 2022-01-01 RX ADMIN — ALBUMIN (HUMAN) 12.5 G: 0.25 INJECTION, SOLUTION INTRAVENOUS at 05:37

## 2022-01-01 RX ADMIN — ALBUMIN (HUMAN) 25 G: 0.25 INJECTION, SOLUTION INTRAVENOUS at 17:40

## 2022-01-01 RX ADMIN — BUDESONIDE 250 MCG: 0.25 INHALANT RESPIRATORY (INHALATION) at 20:47

## 2022-01-01 RX ADMIN — LEVOTHYROXINE SODIUM 112 MCG: 0.11 TABLET ORAL at 08:45

## 2022-01-01 RX ADMIN — SERTRALINE 50 MG: 50 TABLET, FILM COATED ORAL at 08:57

## 2022-01-04 NOTE — PROGRESS NOTES
Post Acute Facility Update     *The information contained in this note was received during a weekly care coordination call with the post acute facility*    Current Facility: [address] [place] [address] [place] ([facility])  Anticipated Discharge Date: 1/3/22    Facility Nursing Update:  Resident  is alert and oriented x2. She complains of being wobbly at times and is participating with rehab therapy as tolerated. On O2 2LPM at night per RP request. Resident's O2 sats remain stable on RA during day. Facility Social Work Update:  Resident was admitted for short term skilled care and plans to stay LTC here due to need for 24 hour care/supervision and assistance with ADL's. SS will assist as needed. Bundle Program Status: none  Upper Extremity Assistance: Stand by Assist - Presence and Cueing  Lower Extremity Assistance: Stand by Assist - Presence and Cueing  Bed Mobility: Contact Guard Assist - hands on patient for balance   Transfers: Contact Guard Assist - hands on patient for balance   Ambulation: Contact Guard Assist - hands on patient for balance   How far (in feet) is the patient ambulating?  150 x2  Device Used: walker  Barriers to Discharge: MARILEE Blanco   BSN, RN  [employer]

## 2022-01-06 NOTE — PROGRESS NOTES
[address] [place] [address] Dr Discharge Note    *The information contained in this note was received during a weekly care coordination call with the post acute facility*      Patient was discharged from [address] [place] [address] ([facility]) on 1/3/2022 to LTC at [facility]. PCP: Germain Montgomery MD  OLIVIA appointment: No future appointments. Community Care Team will sign off at this time. Medications were not reconciled and general patient assessment was not completed during this skilled nursing facility outreach.        Ephraim Gaines LPN Care Coordinator

## 2022-01-18 NOTE — PROGRESS NOTES
1/13/2022. Abdominal ultrasound. \"unremarkable liver. Ascites noted. \"  No mass, lesion noted. Ambulatory

## 2022-01-31 NOTE — ED NOTES
Patient awaiting ride back to [facility] care with hospital to home.  Transportation services will be here at 6 pm.

## 2022-01-31 NOTE — PROGRESS NOTES
Spoke with Dr. Cuba Donis to clarify order for albumin 50g for every 5L paracentesis fluid drained. Ok to use IR protocol of albumin 25g for every 5L paracentesis fluid drained.

## 2022-01-31 NOTE — DISCHARGE INSTRUCTIONS
You are seen in the ER, you had 7 L of fluid removed from your abdomen. Please follow-up with your primary doctor and GI doctor. Your bilirubin was mildly elevated today. Return for worsening symptoms anytime.

## 2022-01-31 NOTE — PROGRESS NOTES
Pharmacy Medication Reconciliation     The patient was NOT interviewed regarding current PTA medication list, use and drug allergies; [address] [facility] [facility] SNF. Allergy Update: Patient has no known allergies. Recommendations/Findings: The following amendments were made to the patient's active medication list on file at [facility]:   1) Additions:   +bowel protocol  +ondansetron  +potassium chloride  2) Deletions: none  3) Changes: none  Pertinent Findings: none    Clarified PTA med list with SNF paperwork. PTA medication list was corrected to the following:     Prior to Admission Medications   Prescriptions Last Dose Informant Taking? albuterol (PROVENTIL VENTOLIN) 2.5 mg /3 mL (0.083 %) nebu  Transfer Papers Yes   Sig: Take 2.5 mg by inhalation every four (4) hours as needed for Wheezing. aspirin delayed-release 81 mg tablet 2022 at Unknown time Transfer Papers Yes   Sig: Take 81 mg by mouth daily. atorvastatin (LIPITOR) 80 mg tablet  Transfer Papers No   Sig: Take 80 mg by mouth daily. bisacodyL (DULCOLAX) 10 mg supp  Transfer Papers Yes   Sig: Insert 10 mg into rectum daily as needed for Constipation (if no BMO from MOM/lax). budesonide (PULMICORT) 180 mcg/actuation aepb inhaler 2022 at Unknown time Transfer Papers Yes   Sig: Take 2 Puffs by inhalation daily. cyanocobalamin (VITAMIN B12) 1,000 mcg/mL injection  Transfer Papers Yes   Si mg IM every wednesday starting 12/8 x 4 shots then monthly  Indications: inadequate vitamin B12   Patient taking differently: 1,000 mcg by IntraMUSCular route every Wednesday. 1000 mg IM every wednesday starting 1/5/2022 x 4 shots then monthly  Indications: inadequate vitamin B12   lactulose (CHRONULAC) 10 gram/15 mL solution  Transfer Papers Yes   Sig: Take 20 g by mouth daily as needed (constipation). levothyroxine (SYNTHROID) 112 mcg tablet 2022 at Unknown time Transfer Papers Yes   Sig: Take 112 mcg by mouth Daily (before breakfast).    magnesium hydroxide (Reynolds Milk of Magnesia) 400 mg/5 mL suspension  Transfer Papers Yes   Sig: Take 30 mL by mouth daily as needed for Constipation. ondansetron hcl (ZOFRAN) 4 mg tablet  Transfer Papers Yes   Sig: Take 4 mg by mouth every six (6) hours as needed for Nausea or Vomiting. polyethylene glycol (Miralax) 17 gram packet  Transfer Papers Yes   Sig: Take 17 g by mouth daily as needed for Constipation. potassium chloride SA (KLOR-CON M15) 15 mEq tablet 1/31/2022 at Unknown time Transfer Papers Yes   Sig: Take 20 mEq by mouth daily. sertraline (ZOLOFT) 100 mg tablet 1/31/2022 at 0900 Transfer Papers Yes   Sig: Take 0.5 Tablets by mouth daily. sodium phosphate (Enema) 19-7 gram/118 mL enema  Transfer Papers Yes   Sig: Insert 1 Enema into rectum once as needed (if no relieve from bisacodyl suppository). spironolactone (ALDACTONE) 25 mg tablet 1/31/2022 at Unknown time Transfer Papers Yes   Sig: Take 25 mg by mouth daily.       Facility-Administered Medications: None        Thank you,  OPHELIA Charles

## 2022-01-31 NOTE — PROGRESS NOTES
Madelyn Treadwell PA-C at bedside to obtain consent for paracentesis. Procedure explained with opportunity to ask questions. Patient states understanding of procedure prior to signing consent.

## 2022-01-31 NOTE — PROGRESS NOTES
Name of procedure: paracentesis - Meg Brand PA-C    Sedation medications given: none    Vital Signs: stable    Fluids removed: 7100mL clear yellow fluid    Samples sent to lab: yes    Any complications related to procedure: none    Post Procedure Care Needed/order sets placed in connect care: none

## 2022-01-31 NOTE — ED PROVIDER NOTES
EMERGENCY DEPARTMENT HISTORY AND PHYSICAL EXAM      Date: 1/31/2022  Patient Name: Patrice Carlton    History of Presenting Illness     Chief Complaint   Patient presents with   [employer] Referral / Consult     Refered by PCP for eval of increased abdominal distention. Hx of cirrhosis. Denied abd pain or N/V/D. History Provided By: Patient    HPI: Patrice Carlton, 73 y.o. female presents to the ED with cc of abdominal distention. Patient reports increased abdominal distention, history of cirrhosis. Reviewed medication list, patient is on spironolactone and lactulose. Reports increasing abdominal swelling 6 mo-1 year, referred to ED by primary care. Resides at [facility]. No abdominal pain. No vomiting or diarrhea. Denies hematemesis. Patient had a paracentesis 3 years ago. Patient has no complaints other than increasing abdominal swelling. Patient referred by PCP, also sees GI. On spironolactone and lactulose. There are no other complaints, changes, or physical findings at this time. PCP: Lalo Villa MD    No current facility-administered medications on file prior to encounter. Current Outpatient Medications on File Prior to Encounter   Medication Sig Dispense Refill    bisacodyL (DULCOLAX) 10 mg supp Insert 10 mg into rectum daily as needed for Constipation (if no BMO from MOM/lax).  sodium phosphate (Enema) 19-7 gram/118 mL enema Insert 1 Enema into rectum once as needed (if no relieve from bisacodyl suppository).  lactulose (CHRONULAC) 10 gram/15 mL solution Take 20 g by mouth daily as needed (constipation).  levothyroxine (SYNTHROID) 112 mcg tablet Take 112 mcg by mouth Daily (before breakfast).  magnesium hydroxide (Reynolds Milk of Magnesia) 400 mg/5 mL suspension Take 30 mL by mouth daily as needed for Constipation.  polyethylene glycol (Miralax) 17 gram packet Take 17 g by mouth daily as needed for Constipation.       potassium chloride SA (KLOR-CON M15) 15 mEq tablet Take 20 mEq by mouth daily.  ondansetron hcl (ZOFRAN) 4 mg tablet Take 4 mg by mouth every six (6) hours as needed for Nausea or Vomiting.  sertraline (ZOLOFT) 100 mg tablet Take 0.5 Tablets by mouth daily. 30 Tablet 3    cyanocobalamin (VITAMIN B12) 1,000 mcg/mL injection 1000 mg IM every wednesday starting 12/8 x 4 shots then monthly  Indications: inadequate vitamin B12 (Patient taking differently: 1,000 mcg by IntraMUSCular route every Wednesday. 1000 mg IM every wednesday starting 1/5/2022 x 4 shots then monthly  Indications: inadequate vitamin B12) 1 mL 0    aspirin delayed-release 81 mg tablet Take 81 mg by mouth daily.  budesonide (PULMICORT) 180 mcg/actuation aepb inhaler Take 2 Puffs by inhalation daily.  albuterol (PROVENTIL VENTOLIN) 2.5 mg /3 mL (0.083 %) nebu Take 2.5 mg by inhalation every four (4) hours as needed for Wheezing.  spironolactone (ALDACTONE) 25 mg tablet Take 25 mg by mouth daily.  atorvastatin (LIPITOR) 80 mg tablet Take 80 mg by mouth daily.  [DISCONTINUED] levothyroxine (SYNTHROID) 137 mcg tablet Take 112 mcg by mouth Daily (before breakfast).          Past History     Past Medical History:  Past Medical History:   Diagnosis Date    [facility] cancer ([facility] [address]     HTN (hypertension)     Hyperlipidemia        Past Surgical History:  Past Surgical History:   Procedure Laterality Date    HX BILATERAL MASTECTOMY         Family History:  Family History   Problem Relation Age of Onset    Emphysema Mother     Heart Attack Father     Breast Cancer Sister        Social History:  Social History     Tobacco Use    Smoking status: Former Smoker     Packs/day: 1.00     Years: 42.00     Pack years: 42.00     Types: Cigarettes     Quit date: 2015     Years since quittin.7    Smokeless tobacco: Never Used   Substance Use Topics    Alcohol use: Never    Drug use: Never       Allergies:  No Known Allergies      Review of Systems   Review of Systems   Constitutional: Negative for activity change, chills and fever. HENT: Negative for facial swelling and voice change. Eyes: Negative for redness. Respiratory: Negative for cough, shortness of breath and wheezing. Cardiovascular: Negative for chest pain and leg swelling. Gastrointestinal: Positive for abdominal distention. Negative for abdominal pain, diarrhea, nausea and vomiting. Genitourinary: Negative for decreased urine volume. Musculoskeletal: Negative for gait problem. Skin: Negative for pallor and rash. Neurological: Negative for tremors and facial asymmetry. Psychiatric/Behavioral: Negative for agitation. All other systems reviewed and are negative. Physical Exam   Physical Exam  Vitals and nursing note reviewed. Constitutional:       Comments: 72-year-old female, resting in bed, no distress   HENT:      Head: Normocephalic and atraumatic. Cardiovascular:      Rate and Rhythm: Normal rate and regular rhythm. Heart sounds: No murmur heard. No friction rub. No gallop. Pulmonary:      Effort: Pulmonary effort is normal.      Breath sounds: Normal breath sounds. Abdominal:      General: There is distension. Palpations: Abdomen is soft. Tenderness: There is no abdominal tenderness. Comments: + fluid wave   Musculoskeletal:         General: No swelling. Normal range of motion. Cervical back: Normal range of motion. Skin:     General: Skin is warm. Capillary Refill: Capillary refill takes less than 2 seconds. Coloration: Skin is not jaundiced. Neurological:      General: No focal deficit present. Mental Status: She is alert.    Psychiatric:         Mood and Affect: Mood normal.         Diagnostic Study Results     Labs -     Recent Results (from the past 12 hour(s))   METABOLIC PANEL, COMPREHENSIVE    Collection Time: 01/31/22  1:08 PM   Result Value Ref Range    Sodium 133 (L) 136 - 145 mmol/L    Potassium 3.7 3.5 - 5.1 mmol/L    Chloride 98 97 - 108 mmol/L    CO2 28 21 - 32 mmol/L    Anion gap 7 5 - 15 mmol/L    Glucose 105 (H) 65 - 100 mg/dL    BUN 19 6 - 20 MG/DL    Creatinine 0.96 0.55 - 1.02 MG/DL    BUN/Creatinine ratio 20 12 - 20      GFR est AA >60 >60 ml/min/1.73m2    GFR est non-AA 58 (L) >60 ml/min/1.73m2    Calcium 8.9 8.5 - 10.1 MG/DL    Bilirubin, total 4.8 (H) 0.2 - 1.0 MG/DL    ALT (SGPT) 83 (H) 12 - 78 U/L    AST (SGOT) 180 (H) 15 - 37 U/L    Alk. phosphatase 167 (H) 45 - 117 U/L    Protein, total 8.7 (H) 6.4 - 8.2 g/dL    Albumin 2.8 (L) 3.5 - 5.0 g/dL    Globulin 5.9 (H) 2.0 - 4.0 g/dL    A-G Ratio 0.5 (L) 1.1 - 2.2     CBC WITH AUTOMATED DIFF    Collection Time: 01/31/22  1:08 PM   Result Value Ref Range    WBC 10.5 3.6 - 11.0 K/uL    RBC 3.91 3.80 - 5.20 M/uL    HGB 12.9 11.5 - 16.0 g/dL    HCT 39.1 35.0 - 47.0 %    .0 (H) 80.0 - 99.0 FL    MCH 33.0 26.0 - 34.0 PG    MCHC 33.0 30.0 - 36.5 g/dL    RDW 23.7 (H) 11.5 - 14.5 %    PLATELET 832 (L) 335 - 400 K/uL    MPV 12.2 8.9 - 12.9 FL    NRBC 0.0 0  WBC    ABSOLUTE NRBC 0.00 0.00 - 0.01 K/uL    NEUTROPHILS 83 (H) 32 - 75 %    LYMPHOCYTES 8 (L) 12 - 49 %    MONOCYTES 7 5 - 13 %    EOSINOPHILS 1 0 - 7 %    BASOPHILS 1 0 - 1 %    IMMATURE GRANULOCYTES 0 0.0 - 0.5 %    ABS. NEUTROPHILS 8.8 (H) 1.8 - 8.0 K/UL    ABS. LYMPHOCYTES 0.8 0.8 - 3.5 K/UL    ABS. MONOCYTES 0.7 0.0 - 1.0 K/UL    ABS. EOSINOPHILS 0.1 0.0 - 0.4 K/UL    ABS. BASOPHILS 0.1 0.0 - 0.1 K/UL    ABS. IMM.  GRANS. 0.0 0.00 - 0.04 K/UL    DF SMEAR SCANNED      RBC COMMENTS ANISOCYTOSIS  3+        RBC COMMENTS MACROCYTOSIS  1+        RBC COMMENTS DAVINA CELLS  PRESENT       AMMONIA    Collection Time: 01/31/22  1:08 PM   Result Value Ref Range    Ammonia 48 (H) <32 UMOL/L   PROTHROMBIN TIME + INR    Collection Time: 01/31/22  1:08 PM   Result Value Ref Range    INR 1.3 (H) 0.9 - 1.1      Prothrombin time 13.5 (H) 9.0 - 11.1 sec   EKG, 12 LEAD, INITIAL    Collection Time: 01/31/22  1:23 PM Result Value Ref Range    Ventricular Rate 83 BPM    Atrial Rate 83 BPM    P-R Interval 126 ms    QRS Duration 78 ms    Q-T Interval 420 ms    QTC Calculation (Bezet) 493 ms    Calculated P Axis -14 degrees    Calculated R Axis -3 degrees    Calculated T Axis -23 degrees    Diagnosis       Normal sinus rhythm  Anterior infarct (cited on or before 31-JAN-2022)  When compared with ECG of 01-DEC-2021 19:30,  T wave inversion more evident in Inferior leads  Nonspecific T wave abnormality now evident in Lateral leads         Radiologic Studies -   US GUIDE PARACENTESIS    (Results Pending)     CT Results  (Last 48 hours)    None        CXR Results  (Last 48 hours)    None          Medical Decision Making   I am the first provider for this patient. I reviewed the vital signs, available nursing notes, past medical history, past surgical history, family history and social history. Vital Signs-Reviewed the patient's vital signs. Patient Vitals for the past 12 hrs:   Temp Pulse Resp BP SpO2   01/31/22 1812 98.4 °F (36.9 °C) 84 18 132/74 98 %   01/31/22 1548 -- 79 18 136/66 100 %   01/31/22 1439 -- 79 18 (!) 142/71 99 %   01/31/22 1221 97.6 °F (36.4 °C) 83 18 (!) 148/85 99 %     Records Reviewed: Nursing Notes and Old Medical Records    Provider Notes (Medical Decision Making):     68-year-old female presents emergency department with increasing abdominal distention. Vital signs stable. Appears quite well without complaints other than abdominal distention. Patient appears well, no abdominal tenderness on palpation. Doubt SBP. Suspect ascites secondary to cirrhosis and potal hypertension, doubt CHF. Will discuss with IR for therapeutic paracentesis, will order albumin, suspect discharge after procedure, screening labs,    ED Course:   Initial assessment performed. The patients presenting problems have been discussed, and they are in agreement with the care plan formulated and outlined with them.   I have encouraged them to ask questions as they arise throughout their visit. ED Course as of 01/31/22 1851   Mon Jan 31, 2022   1427 Patient's labs show mild thrombocytopenia likely due to splenic sequestration. Patient has a mild transaminitis as well as a mildly elevated T bili. INR reassuring. Overall labs suspicious for progression of patient's cirrhosis without ascites. [MB]   7734 Patient's ammonia is elevated but has no signs of hepatic encephalopathy is on lactulose. [MB]   5194 Patient reassessed, no acute distress. Patient has had liters of fluid removed for therapeutic paracentesis. Given known history of cirrhosis, no labs from ascitic fluid needed. Patient reassessed, repeat abdominal exam is benign. Patient reports she feels better. Will discharge with PCP follow-up, informed regarding bilirubin and return precautions. [MB]      ED Course User Index  [MB] MD Uday Peters MD      Disposition:    Discharged    DISCHARGE PLAN:  1. Discharge Medication List as of 1/31/2022  4:36 PM        2. Follow-up Information     Follow up With Specialties Details Why Contact Info    Davian Wei MD Family Medicine In 3 days  [address]  [phone]      [facility] EMERGENCY DEPT Emergency Medicine  If symptoms worsen [address]  [address] [facility]  [phone]    Your GI doctor            3. Return to ED if worse     Diagnosis     Clinical Impression:   1. Cirrhosis of liver with ascites, unspecified hepatic cirrhosis type (HCC)    2. Elevated bilirubin        Attestations:    Uday Horowitz MD    Please note that this dictation was completed with [employer], the computer voice recognition software. Quite often unanticipated grammatical, syntax, homophones, and other interpretive errors are inadvertently transcribed by the computer software. Please disregard these errors.   Please excuse any errors that have escaped final proofreading. Thank you.

## 2022-01-31 NOTE — PROGRESS NOTES
Interventional Radiology Procedure Note        1/31/2022    Provider: Olivia Vieira PA-C  Supervising Physician: Jose A Maxwell MD      Pre-Procedure Diagnosis: ascites  Post-Procedure Diagnosis: ascites    Informed consent obtained    Procedure(s): paracentesis  RLQ paracentesis performed in radiology. 7100ml drained. Catheter removed and sterile dressing applied.      Sedation: none    Specimens removed: 100cc ascitic fluid    Complications: none    Recomendations: none    Discharge Disposition: fair      Full dictated report to follow    Olivia Vieira PA-C  Interventional Radiology

## 2022-01-31 NOTE — ROUTINE PROCESS
Patient transported from ER 20 to IR by this nurse via stretcher for paracentesis. A&O to name, place, and situation.

## 2022-02-04 PROBLEM — E87.1 HYPONATREMIA: Status: ACTIVE | Noted: 2022-01-01

## 2022-02-04 PROBLEM — K74.60 CIRRHOSIS OF LIVER WITH ASCITES (HCC): Status: ACTIVE | Noted: 2022-01-01

## 2022-02-04 PROBLEM — R18.8 CIRRHOSIS OF LIVER WITH ASCITES (HCC): Status: ACTIVE | Noted: 2022-01-01

## 2022-02-04 PROBLEM — R18.8 ASCITES: Status: ACTIVE | Noted: 2022-01-01

## 2022-02-04 NOTE — CONSULTS
Gastroenterology Consultation Note  JORGE LUIS Pemberton   for Dr. Tracey King    NAME: Melina Anthony : 1950 MRN: [id number]   ATTG: Dr. Chana Buckner PCP: Lauryn Levin MD  Date/Time:  2022 11:03 AM  Subjective:   REASON FOR CONSULT:      Melina Anthony is a 73 y.o.  female who I was asked to see for ascites with cirrhosis. Hx of CARBAJAL cirrhosis. Serological w/u non revealing. Presented with worsening abdominal distention. Her last paracentesis was done on  draining 7100 cc of fluid during ER visit for worsening abdominal distention. Since then progessively recuring. It is causing a lot of pain and waking her up in the evenings. No N/V. Some weight gain but unsure how much. She is on Spironolactone 25 mg, she reports MD at [facility] stopped Lasix, she is unsure why. BM are normla without nay melena or hematochezia. No diarrhea. No F/C. Labs done in the ED and hgb stable at 12.2, WNC 9.4, plt 105, Na 131, BUN 21, Cr 0.87, bili 3.6, ALT 56,     She had a CT scan showing:   IMPRESSION  1. Cirrhosis. Moderate to large volume ascites. 2.  Wall thickening involving the ascending and transverse colon, maybe on the  basis of portal colopathy, but correlate for infectious or inflammatory colitis. 3.  Healing lateral right 8th-10th rib fractures. 4.  Cholelithiasis. Family hx of CARBAJAL- sisters. Never a drinker.      Past Medical History:   Diagnosis Date    Breast cancer ([address]     HTN (hypertension)     Hyperlipidemia       Past Surgical History:   Procedure Laterality Date    HX BILATERAL MASTECTOMY       Social History     Tobacco Use    Smoking status: Former Smoker     Packs/day: 1.00     Years: 42.00     Pack years: 42.00     Types: Cigarettes     Quit date: 2015     Years since quittin.7    Smokeless tobacco: Never Used   Substance Use Topics    Alcohol use: Never      Family History   Problem Relation Age of Onset    Emphysema Mother     Heart Attack Father     Breast Cancer Sister       No Known Allergies   Home Medications:  Prior to Admission Medications   Prescriptions Last Dose Informant Patient Reported? Taking? albuterol (PROVENTIL VENTOLIN) 2.5 mg /3 mL (0.083 %) nebu  Transfer Papers Yes No   Sig: Take 2.5 mg by inhalation every four (4) hours as needed for Wheezing. aspirin delayed-release 81 mg tablet  Transfer Papers Yes No   Sig: Take 81 mg by mouth daily. atorvastatin (LIPITOR) 80 mg tablet  Transfer Papers Yes No   Sig: Take 80 mg by mouth daily. bisacodyL (DULCOLAX) 10 mg supp  Transfer Papers Yes No   Sig: Insert 10 mg into rectum daily as needed for Constipation (if no BMO from MOM/lax). budesonide (PULMICORT) 180 mcg/actuation aepb inhaler  Transfer Papers Yes No   Sig: Take 2 Puffs by inhalation daily. cyanocobalamin (VITAMIN B12) 1,000 mcg/mL injection  Transfer Papers No No   Si mg IM every wednesday starting 12/8 x 4 shots then monthly  Indications: inadequate vitamin B12   Patient taking differently: 1,000 mcg by IntraMUSCular route every Wednesday. 1000 mg IM every wednesday starting 1/5/2022 x 4 shots then monthly  Indications: inadequate vitamin B12   lactulose (CHRONULAC) 10 gram/15 mL solution  Transfer Papers Yes No   Sig: Take 20 g by mouth daily as needed (constipation). levothyroxine (SYNTHROID) 112 mcg tablet  Transfer Papers Yes No   Sig: Take 112 mcg by mouth Daily (before breakfast). magnesium hydroxide ([place] Milk of Magnesia) 400 mg/5 mL suspension  Transfer Papers Yes No   Sig: Take 30 mL by mouth daily as needed for Constipation. ondansetron hcl (ZOFRAN) 4 mg tablet  Transfer Papers Yes No   Sig: Take 4 mg by mouth every six (6) hours as needed for Nausea or Vomiting. polyethylene glycol (Miralax) 17 gram packet  Transfer Papers Yes No   Sig: Take 17 g by mouth daily as needed for Constipation.    potassium chloride SA (KLOR-CON M15) 15 mEq tablet  Transfer Papers Yes No   Sig: Take 20 mEq by mouth daily. sertraline (ZOLOFT) 100 mg tablet  Transfer Papers No No   Sig: Take 0.5 Tablets by mouth daily. sodium phosphate (Enema) 19-7 gram/118 mL enema  Transfer Papers Yes No   Sig: Insert 1 Enema into rectum once as needed (if no relieve from bisacodyl suppository). spironolactone (ALDACTONE) 25 mg tablet  Transfer Papers Yes No   Sig: Take 25 mg by mouth daily. Facility-Administered Medications: None     Hospital medications:  No current facility-administered medications for this encounter. Current Outpatient Medications   Medication Sig    bisacodyL (DULCOLAX) 10 mg supp Insert 10 mg into rectum daily as needed for Constipation (if no BMO from MOM/lax).  sodium phosphate (Enema) 19-7 gram/118 mL enema Insert 1 Enema into rectum once as needed (if no relieve from bisacodyl suppository).  lactulose (CHRONULAC) 10 gram/15 mL solution Take 20 g by mouth daily as needed (constipation).  levothyroxine (SYNTHROID) 112 mcg tablet Take 112 mcg by mouth Daily (before breakfast).  magnesium hydroxide ([place] Milk of Magnesia) 400 mg/5 mL suspension Take 30 mL by mouth daily as needed for Constipation.  polyethylene glycol (Miralax) 17 gram packet Take 17 g by mouth daily as needed for Constipation.  potassium chloride SA (KLOR-CON M15) 15 mEq tablet Take 20 mEq by mouth daily.  ondansetron hcl (ZOFRAN) 4 mg tablet Take 4 mg by mouth every six (6) hours as needed for Nausea or Vomiting.  sertraline (ZOLOFT) 100 mg tablet Take 0.5 Tablets by mouth daily.  cyanocobalamin (VITAMIN B12) 1,000 mcg/mL injection 1000 mg IM every wednesday starting 12/8 x 4 shots then monthly  Indications: inadequate vitamin B12 (Patient taking differently: 1,000 mcg by IntraMUSCular route every Wednesday.  1000 mg IM every wednesday starting 1/5/2022 x 4 shots then monthly  Indications: inadequate vitamin B12)    aspirin delayed-release 81 mg tablet Take 81 mg by mouth daily.    budesonide (PULMICORT) 180 mcg/actuation aepb inhaler Take 2 Puffs by inhalation daily.  albuterol (PROVENTIL VENTOLIN) 2.5 mg /3 mL (0.083 %) nebu Take 2.5 mg by inhalation every four (4) hours as needed for Wheezing.  atorvastatin (LIPITOR) 80 mg tablet Take 80 mg by mouth daily.  spironolactone (ALDACTONE) 25 mg tablet Take 25 mg by mouth daily. REVIEW OF SYSTEMS:     []     Unable to obtain  ROS due to  []    mental status change  []    sedated   []    intubated  Review of Systems -   History obtained from the patient  General ROS: negative for - chills, fever or weight gain  Psychological ROS: negative for - anxiety or depression  Hematological and Lymphatic ROS: negative for - bleeding problems  Respiratory ROS: no cough, shortness of breath, or wheezing  Cardiovascular ROS: no chest pain or dyspnea on exertion  Gastrointestinal ROS: see HPI  Genito-Urinary ROS: no dysuria, trouble voiding, or hematuria  Musculoskeletal ROS: negative for - joint pain  Neurological ROS: no TIA or stroke symptoms  Dermatological ROS: negative for - rash    Objective:   VITALS:    Visit Vitals  /82 (BP 1 Location: Left arm, BP Patient Position: At rest)   Pulse 91   Temp 98 °F (36.7 °C)   Resp 24   Ht 5' 4\" (1.626 m)   Wt 67.6 kg (149 lb)   SpO2 98%   BMI 25.58 kg/m²     Temp (24hrs), Av °F (36.7 °C), Min:98 °F (36.7 °C), Max:98 °F (36.7 °C)    PHYSICAL EXAM:   General:    Alert, cooperative WF, no distress, appears stated age. Head:   Normocephalic, without obvious abnormality, atraumatic. Eyes:   Conjunctivae clear, +scleral icterus. Pupils are equal  Nose:  Nares normal. No drainage or sinus tenderness. Throat:    Lips, mucosa, and tongue normal.  No Thrush  Neck:  Supple, symmetrical,  no adenopathy, thyroid: non tender  Lungs:   CTA bilaterally. No wheezing/rhonchi/rales. Heart:   Regular rate and rhythm,  no murmur, rub or gallop.   Abdomen:   Soft, non-tender. + distended with ascites. Bowel sounds normal. No masses. No hepatosplenomegaly. Rectal:  Deferred  Extremities: No cyanosis. No edema. No clubbing  Skin:     Texture, turgor normal. +jaundice, no palmar erythremia, no spider angiomas on chest  Lymph: Cervical, supraclavicular normal.  Psych:  Poor insight. Not depressed. Not anxious or agitated. Neurologic: EOMs intact. No facial asymmetry. No aphasia or slurred speech normal strength, A/O to person and place but not time. LAB DATA REVIEWED:    Lab Results   Component Value Date/Time    WBC 9.4 02/04/2022 09:34 AM    HGB 12.2 02/04/2022 09:34 AM    HCT 35.0 02/04/2022 09:34 AM    PLATELET 560 (L) 42/68/9496 09:34 AM    MCV 95.1 02/04/2022 09:34 AM     Lab Results   Component Value Date/Time    ALT (SGPT) 83 (H) 01/31/2022 01:08 PM    Alk.  phosphatase 167 (H) 01/31/2022 01:08 PM    Bilirubin, direct 0.4 (H) 12/05/2021 02:36 AM    Bilirubin, total 4.8 (H) 01/31/2022 01:08 PM     Lab Results   Component Value Date/Time    Sodium 133 (L) 01/31/2022 01:08 PM    Potassium 3.7 01/31/2022 01:08 PM    Chloride 98 01/31/2022 01:08 PM    CO2 28 01/31/2022 01:08 PM    Anion gap 7 01/31/2022 01:08 PM    Glucose 105 (H) 01/31/2022 01:08 PM    BUN 19 01/31/2022 01:08 PM    Creatinine 0.96 01/31/2022 01:08 PM    BUN/Creatinine ratio 20 01/31/2022 01:08 PM    GFR est AA >60 01/31/2022 01:08 PM    GFR est non-AA 58 (L) 01/31/2022 01:08 PM    Calcium 8.9 01/31/2022 01:08 PM     No results found for: LPSE  Lab Results   Component Value Date/Time    INR 1.3 (H) 01/31/2022 01:08 PM    INR 1.1 10/04/2021 02:21 PM    Prothrombin time 13.5 (H) 01/31/2022 01:08 PM    Prothrombin time 11.2 (H) 10/04/2021 02:21 PM       CT Results (most recent):  Results from [place] encounter on 02/04/22    CT ABD PELV W CONT    Narrative  EXAM: CT ABD PELV W CONT    INDICATION: abd distension    COMPARISON: Ultrasound-guided paracentesis 1/31/2022, CT chest abdomen pelvis  12/1/2021    CONTRAST: 100 mL of Isovue-370. ORAL CONTRAST: None    TECHNIQUE:  Following the uneventful intravenous administration of contrast, thin axial  images were obtained through the abdomen and pelvis. Coronal and sagittal  reconstructions were generated. CT dose reduction was achieved through use of a  standardized protocol tailored for this examination and automatic exposure  control for dose modulation. FINDINGS:  LOWER THORAX: Cardiomegaly. Several calcified mediastinal and hilar lymph nodes  LIVER: Cirrhosis. No discrete mass  BILIARY TREE: Cholelithiasis. CBD is not dilated. SPLEEN: within normal limits. PANCREAS: No mass or ductal dilatation. ADRENALS: Unremarkable. KIDNEYS: No mass, calculus, or hydronephrosis. Several subcentimeter  hypodensities bilateral T small to characterize; no dedicated follow-up  recommended  STOMACH: Small hiatal hernia. Paraesophageal varices. SMALL BOWEL: No dilatation or wall thickening. COLON: No dilatation. Wall thickening involving the ascending and transverse  colon. Diverticulosis. APPENDIX: Normal  PERITONEUM: Moderate to large volume ascites. No pneumoperitoneum. RETROPERITONEUM: No lymphadenopathy or aortic aneurysm. Atherosclerosis. REPRODUCTIVE ORGANS: Uterus and adnexa are within normal limits. URINARY BLADDER: No mass or calculus. BONES: No destructive bone lesion. Healing lateral right 8th-10th rib fractures. Degenerative changes in the spine. ABDOMINAL WALL: No mass or hernia. ADDITIONAL COMMENTS: N/A    Impression  1. Cirrhosis. Moderate to large volume ascites. 2.  Wall thickening involving the ascending and transverse colon, maybe on the  basis of portal colopathy, but correlate for infectious or inflammatory colitis. 3.  Healing lateral right 8th-10th rib fractures. 4.  Cholelithiasis. Impression:  Active Problems:    Cirrhosis of liver with ascites ([facility] [address] (2/4/2022)       Plan:  Patient with CARBAJAL cirrhosis with ascites.   Needs repeat paracentesis, will also check fluid cell count, culture, protein and albumin level. Albumin replacement ordered. In order to prevent recurrence will increase Lasix to 40 mg BID and increase Spironolactone to 50 mg every day. Keep NPO in prep for paracentesis. In addition need to follow LFTs and daily INR. MELD 18 according to labs on 1/31, will recalculate once INR checked. She also seems slightly encephalopathic. Going to start Lactulose, titrate to 3-4 Bm daily. Will check ammonia now before starting. May benefit from long term Xifaxan. In addition needs AFP for [address] screening, liver appears normal on CT today. There is mention of ascending and transverse colitis on CT, abdomen non tender and no diarrhea or hematochezia. Unlikely colitis and more likely secondary to ascites.    Needs EGD as an outpatient for variceal screening.       ___________________________________________________  Care Plan discussed with:    [x]    Patient   []    Family   []    Nursing   []    Attending  Total Time :  30   minutes   ___________________________________________________  GI: JORGE LUIS Spangler

## 2022-02-04 NOTE — PROGRESS NOTES
Notified Dr. Shayy Norris total fluid removed for paracentesis 5600mL, patient received albumin 25g today at [id number]. Dr. Shayy Norris ordered albumin 25g IV x 1.

## 2022-02-04 NOTE — PROGRESS NOTES
Dr Faby Diaz at bedside to obtain consent for paracentesis. Verbal telephone consent obtained from patient's sister, Dorinda Leo. Witnessed by BREANNE Moncada RN.

## 2022-02-04 NOTE — ED NOTES
Attempted to call report to [facility] unit and was told that the Nurse was at lunch and would call back later.

## 2022-02-04 NOTE — H&P
Hospitalist Admission Note    NAME: Jarod Lin   :  1950   MRN:  [id number]     Date/Time:  2022 1:29 PM    Patient PCP: Ayla Banegas MD    Please note that this dictation was completed with [employer], the computer voice recognition software. Quite often unanticipated grammatical, syntax, homophones, and other interpretive errors are inadvertently transcribed by the computer software. Please disregard these errors. Please excuse any errors that have escaped final proofreading  ______________________________________________________________________   Assessment & Plan:  Recent onset of large ascites 2022, POA failing outpatient treatment from  Liver biopsy proven CARBAJAL, dx 2021 POA  --had 7100ml ascites removed by paracentesis . Now with recurrent large ascites in just 4 days. On spironolactone 25mg and lasix 20mg at nursing home  --seen by GI in ER  --await repeat therapeutic paracentesis by radiology  --increasing aldactone to 50mg and lasix to 40mg daily. Kdur 20meq daily added. --ammonia level normal today but was elevated . Start on xifaxan today  --monitor BP.  --2gm salt restricted diet  --MELD score=21. FU with Dr. Konstantin Hendrix outpatient. Needs outpatient EGD to evaluate for varices. Paraesophageal varices seen on CT abdomen. B12 deficiency, dx 2021 with  Recurrent falls and generalized weakness  --sent for rehab at [facility] on discharge   --finished weekly B12 injection. Continue monthly B12 injection    Hypothyroid  --cont levothyroxine    Depression  --continue zoloft    COPD  --cont pulmicort    CAD s/p prior MI  Hyperlipidemia  --continue aspirin, lipitor stopped at NH due to elevated LFTs. Not on b-blocker due to prior low BP. Monitor BP on increased doses of aldactone and lasix. Body mass index is 25.58 kg/m².     Code: full  DVT prophylaxis: lovenox  Surrogate decision maker:  Sister Juhi Freitas          Subjective: CHIEF COMPLAINT:  Abdominal distention    HISTORY OF PRESENT ILLNESS:     Julio César Gutierrez is a 73 y.o. female with liver biopsy proven liver cirrhosis 2021, seen by Dr. Claus Mocnada 10/21, recent admission  for recurrent falls, generalized weakness from B12 deficiency [place] for recurrent abdominal distention. She is getting rehab since discharge on 21. Patient previously not had decompensated liver cirrhosis. Had thrombocytopenia and mild coagulopathy. On 22, sent to ER for abdominal distention. Had US guided paracentesis with removal of 7100ml clear yellow ascites. At [facility], she is on aldactone 25mg and lasix 20mg daily. She is sent back to ER today for recurrent ascites with recurrent increased abdominal distention. Denies fever, chills, n/v.  CT abdomen without contrast with cirrhosis, moderate to large volume ascites, wall thickening involving ascending and transverse colon. She denies any abdominal pain, diarrhea, rectal bleeding. We were asked to admit for work up and evaluation of the above problems.      Past Medical History:   Diagnosis Date    Ascites 2022    7100ml removed by paracentesis    B12 deficiency     [facility] ([facility]     COPD (chronic obstructive pulmonary disease) ([facility]     HTN (hypertension)     Hyperlipidemia     Myocardial infarction ([facility] [facility]     Dr. Elvie Green CARBAJAL (nonalcoholic steatohepatitis) 2022    Recurrent falls       Past Surgical History:   Procedure Laterality Date    HX BILATERAL MASTECTOMY      HX CORONARY STENT PLACEMENT       Social History     Tobacco Use    Smoking status: Former Smoker     Packs/day: 1.00     Years: 42.00     Pack years: 42.00     Types: Cigarettes     Quit date: 2015     Years since quittin.7    Smokeless tobacco: Never Used   Substance Use Topics    Alcohol use: Never      Drug use:  denies      Family History   Problem Relation Age of Onset    Emphysema Mother     Heart Attack Father     Breast Cancer Sister      No Known Allergies     Prior to Admission medications    Medication Sig Start Date End Date Taking? Authorizing Provider   furosemide (Lasix) 20 mg tablet Take 20 mg by mouth daily. Yes Provider, Historical   sodium phosphates (FLEET'S) 9.5-3.5 gram/59 mL enema Insert 1 Enema into rectum as needed. Yes Provider, Historical   bisacodyL (DULCOLAX) 10 mg supp Insert 10 mg into rectum daily as needed for Constipation (if no BMO from MOM/lax). Yes Provider, Historical   lactulose (CHRONULAC) 10 gram/15 mL solution Take 20 g by mouth daily as needed (constipation). Yes Provider, Historical   levothyroxine (SYNTHROID) 112 mcg tablet Take 112 mcg by mouth Daily (before breakfast). Yes Provider, Historical   magnesium hydroxide ([employer] Milk of Magnesia) 400 mg/5 mL suspension Take 30 mL by mouth daily as needed for Constipation. Yes Provider, Historical   polyethylene glycol (Miralax) 17 gram packet Take 17 g by mouth daily as needed for Constipation. Yes Provider, Historical   potassium chloride SA (KLOR-CON M15) 15 mEq tablet Take 20 mEq by mouth daily. Yes Provider, Historical   ondansetron hcl (ZOFRAN) 4 mg tablet Take 4 mg by mouth every six (6) hours as needed for Nausea or Vomiting. Yes Provider, Historical   sertraline (ZOLOFT) 100 mg tablet Take 0.5 Tablets by mouth daily. 12/6/21  Yes Ai Delarosa MD   cyanocobalamin (VITAMIN B12) 1,000 mcg/mL injection 1000 mg IM every wednesday starting 12/8 x 4 shots then monthly  Indications: inadequate vitamin B12  Patient taking differently: 1,000 mcg by IntraMUSCular route every Wednesday. 1000 mg IM every wednesday starting 1/5/2022 x 4 shots then monthly  Indications: inadequate vitamin B12 12/6/21  Yes Ai Delarosa MD   aspirin delayed-release 81 mg tablet Take 81 mg by mouth daily.    Yes Provider, Historical   budesonide (PULMICORT) 180 mcg/actuation aepb inhaler Take 2 Puffs by inhalation daily. Yes Provider, Historical   albuterol (PROVENTIL VENTOLIN) 2.5 mg /3 mL (0.083 %) nebu Take 2.5 mg by inhalation every four (4) hours as needed for Wheezing. Yes Provider, Historical   spironolactone (ALDACTONE) 25 mg tablet Take 25 mg by mouth daily. 21  Yes Provider, Historical   atorvastatin (LIPITOR) 80 mg tablet Take 80 mg by mouth daily. Patient not taking: Reported on 2022   Provider, Historical     REVIEW OF SYSTEMS:  POSITIVE= Bold. Negative = normal text  General:  fever, chills, sweats, generalized weakness, weight loss/gain, loss of appetite  Eyes:  blurred vision, eye pain, loss of vision, diplopia  Ear Nose and Throat:  rhinorrhea, pharyngitis  Respiratory:   cough, sputum production, SOB, wheezing, CHAN, pleuritic pain  Cardiology:  chest pain, palpitations, orthopnea, PND, edema, syncope   Gastrointestinal:  abdominal distention, N/V, dysphagia, diarrhea, constipation, bleeding  Genitourinary:  frequency, urgency, dysuria, hematuria, incontinence  Muskuloskeletal :  arthralgia, myalgia  Hematology:  easy bruising, bleeding, lymphadenopathy  Dermatological:  rash, ulceration, pruritis  Endocrine:  hot flashes or polydipsia  Neurological:  headache, dizziness, confusion, focal weakness, paresthesia, memory loss, gait disturbance  Psychological: anxiety, depression, agitation      Objective:   VITALS:    Visit Vitals  /67   Pulse 83   Temp 98 °F (36.7 °C)   Resp 20   Ht 5' 4\" (1.626 m)   Wt 67.6 kg (149 lb)   SpO2 97%   BMI 25.58 kg/m²     Temp (24hrs), Av °F (36.7 °C), Min:98 °F (36.7 °C), Max:98 °F (36.7 °C)    Body mass index is 25.58 kg/m². PHYSICAL EXAM:    General:    Alert, cooperative, no distress, appears stated age. HEENT: Atraumatic, mild scleral icterus, pink conjunctivae     No oral ulcers, mucosa moist, throat clear. Hearing intact. Neck:  Supple, symmetrical,  thyroid: non tender  Lungs:   Clear to auscultation bilaterally.   No Wheezing or Rhonchi. No rales. Chest wall:  No tenderness  No Accessory muscle use. Heart:   Regular  rhythm, 3/6 SM  murmur   No gallop. No edema. Abdomen:   Distended, +fluid wave, nontender. Bowel sounds normal. No masses  Extremities: No cyanosis. No clubbing  Skin:     Not pale Not Jaundiced  No rashes   Psych:  Fair insight. Not depressed. Not anxious or agitated. Neurologic: EOMs intact. No facial asymmetry. No aphasia or slurred speech. Symmetrical strength in arms 5/5, right leg weaker 4/5 vs 5/5 in left leg, Alert and oriented X self, situation, year, said month is April3. No asterixis      IMAGING RESULTS:   []       I have personally reviewed the actual   []     CXR  []     CT scan  CXR:  CT abd/pelvis:  FINDINGS:   LOWER THORAX: Cardiomegaly. Several calcified mediastinal and hilar lymph nodes  LIVER: Cirrhosis. No discrete mass  BILIARY TREE: Cholelithiasis. CBD is not dilated. SPLEEN: within normal limits. PANCREAS: No mass or ductal dilatation. ADRENALS: Unremarkable. KIDNEYS: No mass, calculus, or hydronephrosis. Several subcentimeter  hypodensities bilateral T small to characterize; no dedicated follow-up  recommended  STOMACH: Small hiatal hernia. Paraesophageal varices. SMALL BOWEL: No dilatation or wall thickening. COLON: No dilatation. Wall thickening involving the ascending and transverse  colon. Diverticulosis. APPENDIX: Normal  PERITONEUM: Moderate to large volume ascites. No pneumoperitoneum. RETROPERITONEUM: No lymphadenopathy or aortic aneurysm. Atherosclerosis. REPRODUCTIVE ORGANS: Uterus and adnexa are within normal limits. URINARY BLADDER: No mass or calculus. BONES: No destructive bone lesion. Healing lateral right 8th-10th rib fractures. Degenerative changes in the spine. ABDOMINAL WALL: No mass or hernia. ADDITIONAL COMMENTS: N/A     IMPRESSION  1. Cirrhosis. Moderate to large volume ascites.   2.  Wall thickening involving the ascending and transverse colon, maybe on the  basis of portal colopathy, but correlate for infectious or inflammatory colitis. 3.  Healing lateral right 8th-10th rib fractures. 4.  Cholelithiasis. EKG:   ________________________________________________________________________  Care Plan discussed with:    Comments   Patient y    [facility]:      ________________________________________________________________________  Prophylaxis:  GI none   DVT lovenox   ________________________________________________________________________  Recommended Disposition:   Home with Family    HH/PT/OT/RN    SNF/LTC y   [de-identified]    ________________________________________________________________________  Code Status:  Full Code y   DNR/DNI    ________________________________________________________________________  TOTAL TIME:  50 minutes      Comments    y Reviewed previous records   >50% of visit spent in counseling and coordination of care  Discussion with patient and/or family and questions answered         ______________________________________________________________________  Yasmin Treadwell MD      Procedures: see electronic medical records for all procedures/Xrays and details which were not copied into this note but were reviewed prior to creation of Plan.     LAB DATA REVIEWED:    Recent Results (from the past 24 hour(s))   CBC WITH AUTOMATED DIFF    Collection Time: 02/04/22  9:34 AM   Result Value Ref Range    WBC 9.4 3.6 - 11.0 K/uL    RBC 3.68 (L) 3.80 - 5.20 M/uL    HGB 12.2 11.5 - 16.0 g/dL    HCT 35.0 35.0 - 47.0 %    MCV 95.1 80.0 - 99.0 FL    MCH 33.2 26.0 - 34.0 PG    MCHC 34.9 30.0 - 36.5 g/dL    RDW 23.9 (H) 11.5 - 14.5 %    PLATELET 881 (L) 286 - 400 K/uL    MPV 10.9 8.9 - 12.9 FL    NRBC 0.0 0  WBC    ABSOLUTE NRBC 0.00 0.00 - 0.01 K/uL    NEUTROPHILS 81 (H) 32 - 75 %    LYMPHOCYTES 9 (L) 12 - 49 %    MONOCYTES 9 5 - 13 %    EOSINOPHILS 1 0 - 7 %    BASOPHILS 0 0 - 1 %    IMMATURE GRANULOCYTES 0 0.0 - 0.5 %    ABS. NEUTROPHILS 7.6 1.8 - 8.0 K/UL    ABS. LYMPHOCYTES 0.8 0.8 - 3.5 K/UL    ABS. MONOCYTES 0.8 0.0 - 1.0 K/UL    ABS. EOSINOPHILS 0.1 0.0 - 0.4 K/UL    ABS. BASOPHILS 0.0 0.0 - 0.1 K/UL    ABS. IMM. GRANS. 0.0 0.00 - 0.04 K/UL    DF AUTOMATED     METABOLIC PANEL, COMPREHENSIVE    Collection Time: 02/04/22 10:23 AM   Result Value Ref Range    Sodium 131 (L) 136 - 145 mmol/L    Potassium 3.7 3.5 - 5.1 mmol/L    Chloride 99 97 - 108 mmol/L    CO2 26 21 - 32 mmol/L    Anion gap 6 5 - 15 mmol/L    Glucose 89 65 - 100 mg/dL    BUN 21 (H) 6 - 20 MG/DL    Creatinine 0.87 0.55 - 1.02 MG/DL    BUN/Creatinine ratio 24 (H) 12 - 20      GFR est AA >60 >60 ml/min/1.73m2    GFR est non-AA >60 >60 ml/min/1.73m2    Calcium 7.8 (L) 8.5 - 10.1 MG/DL    Bilirubin, total 3.6 (H) 0.2 - 1.0 MG/DL    ALT (SGPT) 56 12 - 78 U/L    AST (SGOT) 136 (H) 15 - 37 U/L    Alk. phosphatase 127 (H) 45 - 117 U/L    Protein, total 6.2 (L) 6.4 - 8.2 g/dL    Albumin 2.0 (L) 3.5 - 5.0 g/dL    Globulin 4.2 (H) 2.0 - 4.0 g/dL    A-G Ratio 0.5 (L) 1.1 - 2.2     SAMPLES BEING HELD    Collection Time: 02/04/22 10:23 AM   Result Value Ref Range    SAMPLES BEING HELD PST     COMMENT        Add-on orders for these samples will be processed based on acceptable specimen integrity and analyte stability, which may vary by analyte.    PROTHROMBIN TIME + INR    Collection Time: 02/04/22 11:45 AM   Result Value Ref Range    INR 1.4 (H) 0.9 - 1.1      Prothrombin time 14.6 (H) 9.0 - 11.1 sec   AMMONIA    Collection Time: 02/04/22 11:45 AM   Result Value Ref Range    Ammonia 27 <32 UMOL/L

## 2022-02-04 NOTE — ED PROVIDER NOTES
EMERGENCY DEPARTMENT HISTORY AND PHYSICAL EXAM      Date: 2/4/2022  Patient Name: Karen Bond    History of Presenting Illness     Chief Complaint   Patient presents with   [facility] Other     pt comes from [facility] care for acities. had it drained on 1/31/2022 and is worsening today. pt denies SOB, CP, diffulty toileting. hx of Breast Ca          HPI: Karen Bond, 73 y.o. female with history of cirrhosis status post therapeutic paracentesis about a week ago presenting to ED with abdominal distention. Per old records, she had about 7 L withdrawn at most recent visit. She states that it is rapidly reaccumulated. She denies any other symptoms including abdominal pain, fevers, chills, vomiting. She feels well other than her distended abdomen. She does not currently see gastroenterology. There are no other complaints, changes, or physical findings at this time. PCP: Davian Wei MD    No current facility-administered medications on file prior to encounter. Current Outpatient Medications on File Prior to Encounter   Medication Sig Dispense Refill    bisacodyL (DULCOLAX) 10 mg supp Insert 10 mg into rectum daily as needed for Constipation (if no BMO from MOM/lax).  lactulose (CHRONULAC) 10 gram/15 mL solution Take 20 g by mouth daily as needed (constipation).  levothyroxine (SYNTHROID) 112 mcg tablet Take 112 mcg by mouth Daily (before breakfast).  magnesium hydroxide ([place] Milk of Magnesia) 400 mg/5 mL suspension Take 30 mL by mouth daily as needed for Constipation.  polyethylene glycol (Miralax) 17 gram packet Take 17 g by mouth daily as needed for Constipation.  potassium chloride SA (KLOR-CON M15) 15 mEq tablet Take 20 mEq by mouth daily.  ondansetron hcl (ZOFRAN) 4 mg tablet Take 4 mg by mouth every six (6) hours as needed for Nausea or Vomiting.       [DISCONTINUED] sodium phosphate (Enema) 19-7 gram/118 mL enema Insert 1 Enema into rectum once as needed (if no relieve from bisacodyl suppository).  sertraline (ZOLOFT) 100 mg tablet Take 0.5 Tablets by mouth daily. 30 Tablet 3    cyanocobalamin (VITAMIN B12) 1,000 mcg/mL injection 1000 mg IM every wednesday starting 12/8 x 4 shots then monthly  Indications: inadequate vitamin B12 (Patient taking differently: 1,000 mcg by IntraMUSCular route every Wednesday. 1000 mg IM every wednesday starting 1/5/2022 x 4 shots then monthly  Indications: inadequate vitamin B12) 1 mL 0    aspirin delayed-release 81 mg tablet Take 81 mg by mouth daily.  budesonide (PULMICORT) 180 mcg/actuation aepb inhaler Take 2 Puffs by inhalation daily.  albuterol (PROVENTIL VENTOLIN) 2.5 mg /3 mL (0.083 %) nebu Take 2.5 mg by inhalation every four (4) hours as needed for Wheezing.  atorvastatin (LIPITOR) 80 mg tablet Take 80 mg by mouth daily.  spironolactone (ALDACTONE) 25 mg tablet Take 25 mg by mouth daily. Past History     Past Medical History:  Past Medical History:   Diagnosis Date    Breast cancer ([facility] [address]     HTN (hypertension)     Hyperlipidemia        Past Surgical History:  Past Surgical History:   Procedure Laterality Date    HX BILATERAL MASTECTOMY         Family History:  Family History   Problem Relation Age of Onset    Emphysema Mother     Heart Attack Father     Breast Cancer Sister        Social History:  Social History     Tobacco Use    Smoking status: Former Smoker     Packs/day: 1.00     Years: 42.00     Pack years: 42.00     Types: Cigarettes     Quit date: 2015     Years since quittin.7    Smokeless tobacco: Never Used   Substance Use Topics    Alcohol use: Never    Drug use: Never       Allergies:  No Known Allergies      Review of Systems   Review of Systems   Constitutional: Negative for chills and fever. HENT: Negative for rhinorrhea. Eyes: Negative for redness. Respiratory: Negative for shortness of breath. Cardiovascular: Negative for chest pain. Gastrointestinal: Positive for abdominal distention. Negative for abdominal pain and diarrhea. Genitourinary: Negative for dysuria. Musculoskeletal: Negative for back pain. Neurological: Negative for syncope. Psychiatric/Behavioral: The patient is not nervous/anxious. All other systems reviewed and are negative. Physical Exam   Physical Exam  Vitals and nursing note reviewed. Constitutional:       Appearance: Normal appearance. HENT:      Head: Normocephalic and atraumatic. Mouth/Throat:      Mouth: Mucous membranes are moist.   Eyes:      Extraocular Movements: Extraocular movements intact. Cardiovascular:      Rate and Rhythm: Normal rate and regular rhythm. Pulses: Normal pulses. Pulmonary:      Effort: Pulmonary effort is normal.      Breath sounds: Normal breath sounds. Abdominal:      General: There is distension. Palpations: Abdomen is soft. Tenderness: There is no abdominal tenderness. Musculoskeletal:         General: No deformity. Cervical back: Neck supple. Skin:     General: Skin is warm and dry. Neurological:      General: No focal deficit present. Mental Status: She is alert.    Psychiatric:         Mood and Affect: Mood normal.         Behavior: Behavior normal.         Diagnostic Study Results     Labs -     Recent Results (from the past 24 hour(s))   CBC WITH AUTOMATED DIFF    Collection Time: 02/04/22  9:34 AM   Result Value Ref Range    WBC 9.4 3.6 - 11.0 K/uL    RBC 3.68 (L) 3.80 - 5.20 M/uL    HGB 12.2 11.5 - 16.0 g/dL    HCT 35.0 35.0 - 47.0 %    MCV 95.1 80.0 - 99.0 FL    MCH 33.2 26.0 - 34.0 PG    MCHC 34.9 30.0 - 36.5 g/dL    RDW 23.9 (H) 11.5 - 14.5 %    PLATELET 158 (L) 811 - 400 K/uL    MPV 10.9 8.9 - 12.9 FL    NRBC 0.0 0  WBC    ABSOLUTE NRBC 0.00 0.00 - 0.01 K/uL    NEUTROPHILS 81 (H) 32 - 75 %    LYMPHOCYTES 9 (L) 12 - 49 %    MONOCYTES 9 5 - 13 %    EOSINOPHILS 1 0 - 7 %    BASOPHILS 0 0 - 1 %    IMMATURE GRANULOCYTES 0 0.0 - 0.5 %    ABS. NEUTROPHILS 7.6 1.8 - 8.0 K/UL    ABS. LYMPHOCYTES 0.8 0.8 - 3.5 K/UL    ABS. MONOCYTES 0.8 0.0 - 1.0 K/UL    ABS. EOSINOPHILS 0.1 0.0 - 0.4 K/UL    ABS. BASOPHILS 0.0 0.0 - 0.1 K/UL    ABS. IMM. GRANS. 0.0 0.00 - 0.04 K/UL    DF AUTOMATED     METABOLIC PANEL, COMPREHENSIVE    Collection Time: 02/04/22 10:23 AM   Result Value Ref Range    Sodium 131 (L) 136 - 145 mmol/L    Potassium 3.7 3.5 - 5.1 mmol/L    Chloride 99 97 - 108 mmol/L    CO2 26 21 - 32 mmol/L    Anion gap 6 5 - 15 mmol/L    Glucose 89 65 - 100 mg/dL    BUN 21 (H) 6 - 20 MG/DL    Creatinine 0.87 0.55 - 1.02 MG/DL    BUN/Creatinine ratio 24 (H) 12 - 20      GFR est AA >60 >60 ml/min/1.73m2    GFR est non-AA >60 >60 ml/min/1.73m2    Calcium 7.8 (L) 8.5 - 10.1 MG/DL    Bilirubin, total 3.6 (H) 0.2 - 1.0 MG/DL    ALT (SGPT) 56 12 - 78 U/L    AST (SGOT) 136 (H) 15 - 37 U/L    Alk. phosphatase 127 (H) 45 - 117 U/L    Protein, total 6.2 (L) 6.4 - 8.2 g/dL    Albumin 2.0 (L) 3.5 - 5.0 g/dL    Globulin 4.2 (H) 2.0 - 4.0 g/dL    A-G Ratio 0.5 (L) 1.1 - 2.2     SAMPLES BEING HELD    Collection Time: 02/04/22 10:23 AM   Result Value Ref Range    SAMPLES BEING HELD PST     COMMENT        Add-on orders for these samples will be processed based on acceptable specimen integrity and analyte stability, which may vary by analyte. PROTHROMBIN TIME + INR    Collection Time: 02/04/22 11:45 AM   Result Value Ref Range    INR 1.4 (H) 0.9 - 1.1      Prothrombin time 14.6 (H) 9.0 - 11.1 sec   AMMONIA    Collection Time: 02/04/22 11:45 AM   Result Value Ref Range    Ammonia 27 <32 UMOL/L       Radiologic Studies -   CT ABD PELV W CONT   Final Result   1. Cirrhosis. Moderate to large volume ascites. 2.  Wall thickening involving the ascending and transverse colon, maybe on the   basis of portal colopathy, but correlate for infectious or inflammatory colitis. 3.  Healing lateral right 8th-10th rib fractures. 4.  Cholelithiasis.       [address] GUIDE PARACENTESIS    (Results Pending)     CT Results  (Last 48 hours)               02/04/22 1000  CT ABD PELV W CONT Final result    Impression:  1. Cirrhosis. Moderate to large volume ascites. 2.  Wall thickening involving the ascending and transverse colon, maybe on the   basis of portal colopathy, but correlate for infectious or inflammatory colitis. 3.  Healing lateral right 8th-10th rib fractures. 4.  Cholelithiasis. Narrative:  EXAM: CT ABD PELV W CONT       INDICATION: abd distension        COMPARISON: Ultrasound-guided paracentesis 1/31/2022, CT chest abdomen pelvis   12/1/2021        CONTRAST: 100 mL of Isovue-370. ORAL CONTRAST: None       TECHNIQUE:    Following the uneventful intravenous administration of contrast, thin axial   images were obtained through the abdomen and pelvis. Coronal and sagittal   reconstructions were generated. CT dose reduction was achieved through use of a   standardized protocol tailored for this examination and automatic exposure   control for dose modulation. FINDINGS:    LOWER THORAX: Cardiomegaly. Several calcified mediastinal and hilar lymph nodes   LIVER: Cirrhosis. No discrete mass   BILIARY TREE: Cholelithiasis. CBD is not dilated. SPLEEN: within normal limits. PANCREAS: No mass or ductal dilatation. ADRENALS: Unremarkable. KIDNEYS: No mass, calculus, or hydronephrosis. Several subcentimeter   hypodensities bilateral T small to characterize; no dedicated follow-up   recommended   STOMACH: Small hiatal hernia. Paraesophageal varices. SMALL BOWEL: No dilatation or wall thickening. COLON: No dilatation. Wall thickening involving the ascending and transverse   colon. Diverticulosis. APPENDIX: Normal   PERITONEUM: Moderate to large volume ascites. No pneumoperitoneum. RETROPERITONEUM: No lymphadenopathy or aortic aneurysm. Atherosclerosis. REPRODUCTIVE ORGANS: Uterus and adnexa are within normal limits.    URINARY BLADDER: No mass or calculus. BONES: No destructive bone lesion. Healing lateral right 8th-10th rib fractures. Degenerative changes in the spine. ABDOMINAL WALL: No mass or hernia. ADDITIONAL COMMENTS: N/A               CXR Results  (Last 48 hours)    None            Medical Decision Making   I am the first provider for this patient. I reviewed the vital signs, available nursing notes, past medical history, past surgical history, family history and social history. Vital Signs-Reviewed the patient's vital signs. Patient Vitals for the past 24 hrs:   Temp Pulse Resp BP SpO2   02/04/22 1106 -- 84 16 125/64 95 %   02/04/22 0929 98 °F (36.7 °C) 91 24 129/82 98 %         Provider Notes (Medical Decision Making):   Evaluated by GI in the ED who recommends admission for further management. ED Course:     Initial assessment performed. The patients presenting problems have been discussed, and they are in agreement with the care plan formulated and outlined with them. I have encouraged them to ask questions as they arise throughout their visit. Disposition:  admit    PLAN:  1. Current Discharge Medication List        2.    Follow-up Information    None       Return to ED if worse     Diagnosis     Clinical Impression: ascites

## 2022-02-04 NOTE — PROGRESS NOTES
TRANSFER - OUT REPORT:    Verbal report given to LTAC, located within [facility], RN (name) on Camryn Bruno  being transferred to [address] [place] [address] ([facility] for routine progression of care       Report consisted of patients Situation, Background, Assessment and   Recommendations(SBAR). Information from the following report(s) SBAR, Procedure Summary, Intake/Output and MAR was reviewed with the receiving nurse. Opportunity for questions and clarification was provided.       Patient transported with:  Consent and labels

## 2022-02-05 NOTE — PROGRESS NOTES
End of Shift Note    Bedside shift change report given to GUSTAVO Martines (oncoming nurse) by Quang Gibbs RN (offgoing nurse). Report included the following information SBAR, Kardex, ED Summary, Procedure Summary, Intake/Output and MAR    PICC TEAM will do Pt's labs      Shift worked:  [id number]     Shift summary and any significant changes:     Pt rested quietly throughout shift, with no complaints of pain. Safety rounding and toileting needs were met.       Concerns for physician to address:       Zone phone for oncoming shift:            Quang Gibbs RN

## 2022-02-05 NOTE — PROGRESS NOTES
Transition of Care Plan:    RUR: 21%  Disposition: Return to [facility] vs. SNF pending PT/OT  Follow up appointments: PCP, Hepatology/GI  DME needed: None - LTC resident  Transportation at Discharge: [address] [place] [address] or means to access home: N/A       Medicare Letter: Needed at d/c  Is patient a BCPI-A Bundle: N/A         If yes, was Bundle Letter given?: N/A   Is patient a  and connected with the [place]? N/A              If yes, was [place] transfer form completed and [facility] notified? N/A  Caregiver Contact: Karlo Motta, sister ([phone])  Discharge Caregiver contacted prior to discharge? To be contacted prior to d/c by [address] [place] [address] needed?: Not indicated at this time                  Reason for Admission:   Ascites, Hyponatremia               RUR Score: 21%     PCP: First and Last name:  Maddie Mix MD     Name of Practice:   Are you a current patient: Yes/No: Yes   Approximate date of last visit: Connie Kaur - seen by MD at [facility]    Can you do a virtual visit with your PCP: N/A             Resources/supports as identified by patient/family: Medicaid pending, currently at [facility] with transition to LTC 1/3/22 from SNF. Has 2 supportive sister that live locally and supportive neighbor/landlord/friend Sofia Zee). Top Challenges facing patient (as identified by patient/family and CM): Finances/Medication cost?  None - Has Medicare A&B and Medicaid pending for LTC @ [facility]. Transportation? Reports utilizing w/c van or stretcher transport for any outpatient appointments from [facility]. Support system or lack thereof?  2 supportive sister, 1 friend                     Living arrangements? LTC @ [facility] on 1/3/22              Self-care/ADLs/Cognition? Reports using w/c for mobility at LT. Receives 24/7 nursing care services at [address] [place] [address].           Current Advanced Directive/Advance Care Plan:  Full Code - no current ACP on file at this time. CM provided education. Never , no children. Has 2 sisters that live locally. Declined to complete ACP at this time with CM. Healthcare Decision Maker: Jojo Reynolds, sister ([phone])  Click here to complete [address] including selection of the Healthcare Decision Maker Relationship (ie \"Primary\")    Payor Source [place]or[place] [facility] [place] / Plan: Elissa Jessica / Product Type: Medicare /                           Plan for utilizing home health:  N/A - SNF vs. Return to LTC                   Transition of Care Plan:     - Return to LTC @ [address] [place] [address] vs. SNF pending PT/OT consults  - BLS transport  - 2nd IM Letter  - Medicaid Pending  - Follow-up appointment as needed    74 YO White Female admitted on 2/4/22 for Ascites, Hyponatremia. Previously admitted on 12/21/22 for recurrent falls. Discharged to [address] [place] [address] for sub-acute rehab and transitioned to LTC on 1/3/22. Previously had been living in a rental apartment in a [facility] with 5 HI alone. Not , no children. Has 2 supportive sisters that live locally (Meenakshi Vilchis) and neighbor/friend/landlord (Donald Rojas). Hx of [facility]), SNF ([facility]). Reports she uses a w/c for mobility when OOB at LTC. Record indicate she wears 2LPM O2 at night, however pt denied upon assessment today. Medications filled at [address] [place] [address]. Has Medicare A&B. Pt unsure if she has LTC coverage/insurance at [address] [place] [address]. CM met with pt at bedside to verify demographic info, complete initial assessment. Pt awake, alert, and pleasant throughout assessment. Confirmed plan to return to [address] [place] [address] at d/c. CM explained PT/OT consults are pending and if they do recommend any SNF-level rehab that [address] [place] [address] may be able to skill her through Medicare. Pt verbalized understanding. Pt unsure if she has Medicaid and/or LTC insurance, unsure payor source at [address] [place] [address].  CM spoke with Lucie Florentino (Admissions @ [address] [place] [address]) who confirmed that pt is Medicaid pending. Pt also follows with Dr. Dejan Connell for Hepatology/GI. Pt will need BLS transport back to [facility] at d/c. Referral sent to [facility] via [facility] link. Will confirm after PT/OT evaluation if they are recommending any additional rehab or to have pt return to LTC. Per NP, pt is not medically stable and likely won't be ready for d/c until possibly Tuesday. CM will continue to remain available to assist with d/c planning          Care Management Interventions  PCP Verified by CM:  Yes  Palliative Care Criteria Met (RRAT>21 & CHF Dx)?: No  Mode of Transport at Discharge: BLS  Transition of Care Consult (CM Consult): Discharge Planning  Discharge Durable Medical Equipment: No (uses w/c at [facility])  Health Maintenance Reviewed: Yes  Physical Therapy Consult: Yes  Occupational Therapy Consult: Yes  Speech Therapy Consult: No  Support Systems: Other Family Member(s)  Confirm Follow Up Transport: [facility] for Transition of Care is Related to the Following Treatment Goals : Return to LTC vs. SNF pending PT/OT recommendations  The Patient and/or Patient Representative was Provided with a Choice of Provider and Agrees with the Discharge Plan?: Yes  Name of the Patient Representative Who was Provided with a Choice of Provider and Agrees with the Discharge Plan: Rosa Maria Lutz (pt)  Discharge Location  Patient Expects to be Discharged to[de-identified] [address], [place]. Dulce [address] Manager  [phone]

## 2022-02-05 NOTE — PROGRESS NOTES
Bedside and Verbal shift change report given to Hai Cramer RN (oncoming nurse) by Nikole Sharma RN (offgoing nurse). Report included the following information SBAR, Kardex and Quality Measures.

## 2022-02-05 NOTE — PROGRESS NOTES
I reviewed pertinent labs and imaging, and discussed /agreed on the plan of care with Dr. Tae Quiroz. Hospitalist Progress Note    NAME: Ashley Malave   :  1950   MRN:  [id number]     Excerpted HPI and summary of hospital course:   Domi Enamorado is a 73 y.o. female with liver biopsy proven liver cirrhosis 2021, seen by Dr. Houston Carpenter 10/21, recent admission  for recurrent falls, generalized weakness from B12 deficiency sent from [facility] for recurrent abdominal distention. She is getting rehab since discharge on 21. Patient previously not had decompensated liver cirrhosis. Had thrombocytopenia and mild coagulopathy. On 22, sent to ER for abdominal distention. Had US guided paracentesis with removal of 7100ml clear yellow ascites. At [facility], she is on aldactone 25mg and lasix 20mg daily.      She is sent back to ER today for recurrent ascites with recurrent increased abdominal distention. Denies fever, chills, n/v.  CT abdomen without contrast with cirrhosis, moderate to large volume ascites, wall thickening involving ascending and transverse colon.     She denies any abdominal pain, diarrhea, rectal bleeding.     We were asked to admit for work up and evaluation of the above problems.  \"      Assessment / Plan:  Recurrent ascites with increased abdominal distention  Cirrhosis secondary to CARBAJAL  -GI is on board- appreciate the recommendations   -T bili 6.2 albumin 2, ALT 56, , ALK phos 127, INR 1.4  -Ammonia is 27  -Status post ultrasound-guided paracentesis in the ER which yielded approximately 5600 mL of fluid  -Peritoneal fluid culture  -SAAG 1.6 suggests portal HTN   -She states that her abd distension/abd pain is slightly improved today  -Continue Lasix, Xifaxan, spironolactone    Hypervolemic  hyponatremia, likely secondary to intravascular fluid depletion from third spacing, improving  -Na 133 from 131  -Consider nephrology consult  if worsens   -Repeat labs in AM     Thrombocytopenia  -Secondary to cirrhosis  -Platelets stable at 379  -Monitor for signs or symptoms of bleeding    Wall thickening involving ascending and transverse colon on CT scan  -Likely on the basis of portal colopathy  -GI is on board   -WBC 9.4, afebrile    Recurrent falls, generalized weakness from B12 deficiency  -Continue vitamin B12 injections    History of COPD, former smoker  -Continue Pulmicort    Hypothyroidism  -Continue Synthroid    Hypertension  -Continue on Lasix, Aldactone     Hyperlipidemia  -No PTA med listed for this, likely because of her cirrhosis     History of MI status post stents  -Continue aspirin      25.0 - 29.9 Overweight / Body mass index is 25.58 kg/m². Estimated discharge date: February 7  Barriers: Medical readiness     Code status: Full  Prophylaxis: SCD's  Recommended Disposition: pending PT/OT eval     Subjective:     Chief Complaint / Reason for Physician Visit  Hospital follow up regarding: recurrent abdominal distention. She is seen sitting up in the chair, eating lunch. States that her belly feels less tight and less tender today. No fevers/chills/N/V. Updated to the plan of care; she verbalized understanding. Discussed with RN events overnight. Review of Systems:  Symptom Y/N Comments  Symptom Y/N Comments   Fever/Chills N   Chest Pain N    Poor Appetite N   Edema N    Cough N   Abdominal Pain Y Improved (see above)   Sputum N   Joint Pain N    SOB/CHAN N   Pruritis/Rash N    Nausea/vomit N   Tolerating PT/OT -    Diarrhea N   Tolerating Diet Y    Constipation N   Other       Could NOT obtain due to: N/A     Objective:     VITALS:   Last 24hrs VS reviewed since prior progress note.  Most recent are:  Patient Vitals for the past 24 hrs:   Temp Pulse Resp BP SpO2   02/04/22 2321 97.4 °F (36.3 °C) 70 18 111/60 98 %   02/04/22 1921 97.6 °F (36.4 °C) 77 18 122/61 98 %   02/04/22 1605 -- 80 18 109/61 98 %   02/04/22 1530 97.7 °F (36.5 °C) 79 18 127/79 98 % 02/04/22 1525 -- 79 18 126/60 97 %   02/04/22 1331 -- 83 17 136/71 97 %   02/04/22 1224 -- 83 20 129/67 97 %   02/04/22 1106 -- 84 16 125/64 95 %   02/04/22 0929 98 °F (36.7 °C) 91 24 129/82 98 %     No intake or output data in the 24 hours ending 02/05/22 0726     I had a face to face encounter and independently examined this patient on 2/5/2022, as outlined below:  PHYSICAL EXAM:  General: Alert, cooperative, no acute distress    EENT:  Icteric sclerae. MMM  Resp:  CTA bilaterally, no wheezing or rales. No accessory muscle use  CV:  Regular  rhythm,  No murmurs, rubs, gallops  GI:  Soft, Distended, Mild tenderness. +Bowel sounds + Fluid wave   Neurologic:  Non-focal, cranial nerves II-XII grossly intact. No asterixis. Psych:   Good insight. Not anxious nor agitated  Skin:  No rashes. No jaundice  MSK:  Moves all extremities  PVS:   Palpable pulses, no peripheral edema. Reviewed most current lab test results and cultures  YES  Reviewed most current radiology test results   YES  Review and summation of old records today    NO  Reviewed patient's current orders and MAR    YES  PMH/SH reviewed - no change compared to H&P  ________________________________________________________________________  Care Plan discussed with:    Comments   Patient [address] Y    Consultant                        Multidiciplinary team rounds were held today with , nursing, pharmacist and clinical coordinator. Patient's plan of care was discussed; medications were reviewed and discharge planning was addressed.      ________________________________________________________________________      Comments   >50% of visit spent in counseling and coordination of care Y    ________________________________________________________________________  Telma Cordero NP     Procedures: see electronic medical records for all procedures/Xrays and details which were not copied into this note but were reviewed prior to creation of Plan. LABS:  I reviewed today's most current labs and imaging studies.   Pertinent labs include:  Recent Labs     02/04/22  0934   WBC 9.4   HGB 12.2   HCT 35.0   *     Recent Labs     02/04/22  1145 02/04/22  1023   NA  --  131*   K  --  3.7   CL  --  99   CO2  --  26   GLU  --  89   BUN  --  21*   CREA  --  0.87   CA  --  7.8*   ALB  --  2.0*   TBILI  --  3.6*   ALT  --  56   INR 1.4*  --        Signed: Sebastián Dunne, NP

## 2022-02-05 NOTE — PROGRESS NOTES
OCCUPATIONAL THERAPY EVALUATION/DISCHARGE  Patient: Lennox Benson (16 y.o. female)  Date: 2/5/2022  Primary Diagnosis: Ascites [R18.8]  Hyponatremia [E87.1]       Precautions: Fall - Chair alarm       ASSESSMENT  Based on the objective data described below, the patient presents likely at her functional baseline with BADL (Setup to Max A currently) per chart, prior OT notes and what Pt was able to report during OT visit given baseline confusion. BP was stable with activity. Pt was A&Ox2 (person and time), pleasantly confused, however unable to recall reason for admission or place. Pt completed supine<>sit with Min A, scooted to EOB with CGA only and donned socks with Max A overall with fair sitting balance and x1 posterior LOB requiring therapist assist. Pt completed sit<>stand with Max Assist x1 and stand pivot transfer bed<>chair with Total Assist x1 using gait belt and B knee blocking d/t significant posterior lean and extension in standing. Pt with significant fear of falling given multiple falls in past, per Pt. At this time Pt appears at her functional baseline and is not in need of acute/post acute skilled OT. May benefit from acute skilled PT to maximize safety and independence with stand pivot transfers in prep for D/C back to SNF LTC facility. Seated EOB  155/94  HR 88  Seated in chair  154/90  HR 92    Current Level of Function (ADLs/self-care): Setup to Max A with ADL. Max A sit<>stand with strong posterior lean/extension and Total Assist x1 stand pivot bed<>chair. Mostly W/C bound at LTC facility. Functional Outcome Measure: The patient scored 35/100 on the Barthel Index outcome measure which is indicative of requiring 65% assist with ADL/related mobility. Other factors to consider for discharge: High fall risk. LTC resident. PLAN :  Recommend with staff: x1-2 assist for stand pivot transfers bed<>chair using gait belt for meals.  Bed pan for toileting given significant posterior lean/extension in standing she is not safe to mobilize to [facility] for toileting needs at this time, pending progress with PT. Recommendation for discharge: (in order for the patient to meet his/her long term goals)  No skilled occupational therapy/ follow up rehabilitation needs identified at this time. At baseline for self-care. This discharge recommendation:  Has been made in collaboration with the attending provider and/or case management    IF patient discharges home will need the following DME: patient owns DME required for discharge       SUBJECTIVE:   Patient stated I had surgery. \" (pt thought she was at [facility] and just underwent surgery when asked place/situation) and \"I'm so afraid of falling. \"    OBJECTIVE DATA SUMMARY:   HISTORY:   Past Medical History:   Diagnosis Date    Ascites 01/31/2022    7100ml removed by paracentesis    B12 deficiency     [facility] [facility]     [facility]ch[facility] obstructive [facility] disease[facility]     HTN (hypertension)     Hyperlipidemia     Myocardial infarction ([facility] [address] [facility] 2017    Dr. Shawnee CARBAJAL (nonalcoholic steatohepatitis) 10/2021    Dr. Lauraine Hodgkin Recurrent falls      Past Surgical History:   Procedure Laterality Date    HX BILATERAL MASTECTOMY      HX CORONARY STENT PLACEMENT         Prior Level of Function/Environment/Context: Per chart, Pt now LTC resident at [facility]. Pt with confusion and memory loss, unable to share many details re: PLOF. Pt did state she spends majority of day in W/C and can self-propel. Receives assist from staff with ADL. Wears depends.  Hx of falls which Pt did      Expanded or extensive additional review of patient history:   Home Situation  Home Environment: Long term care  Support Systems: Other Family Member(s)  Patient Expects to be Discharged to[de-identified] Long Term Care    Hand dominance: Right    EXAMINATION OF PERFORMANCE DEFICITS:  Cognitive/Behavioral Status:     Orientation Level: Oriented to person;Oriented to time;Disoriented to place; Disoriented to situation (thought she was at [facility] and had surgery)  Cognition: Follows commands; No command following             Skin: Intact    Edema: None observed    Hearing: Auditory  Auditory Impairment: None    Vision/Perceptual:                           Acuity: Within Defined Limits;Able to read clock/calendar on wall without difficulty; Able to read employee name badge without difficulty    Corrective Lenses: Glasses    Range of Motion:  AROM: Generally decreased, functional (BUEs, BLEs grossly decreased)                         Strength:  Strength: Grossly decreased, non-functional                Coordination:  Coordination: Grossly decreased, non-functional  Fine Motor Skills-Upper: Left Intact; Right Intact    Gross Motor Skills-Upper: Left Intact; Right Intact    Tone & Sensation:    Tone: Normal  Sensation: Intact                      Balance:  Sitting: Impaired  Sitting - Static: Fair (occasional)  Sitting - Dynamic: Fair (occasional)  Standing: Impaired; With support (SPT bed<>chair)  Standing - Static: Poor;Constant support  Standing - Dynamic : Poor;Constant support    Functional Mobility and Transfers for ADLs:  Bed Mobility:  Supine to Sit: Minimum assistance  Scooting: Contact guard assistance    Transfers:  Sit to Stand: Maximum assistance  Stand to Sit: Total assistance  Bed to Chair: Total assistance;Assist x1 (SPT)  Toilet Transfer :  (recommend bed pan only at this time)  Assistive Device : Gait Belt    ADL Assessment:  Feeding: Setup    Oral Facial Hygiene/Grooming: Minimum assistance (d/t cognition)    Bathing:  Moderate assistance;Maximum assistance    Upper Body Dressing: Minimum assistance    Lower Body Dressing: Maximum assistance (d/t cognition and decreased sitting balance)    Toileting: Maximum assistance          ADL Intervention and task modifications:    Provided CGA-Min A physical assist and verbal/tactile cues to maximize Pts unsupported sitting balance and distal LB reach during attempt to don socks at EOB. Instructed Pt that bed pan is safety means for toileting at this time given significant LE extension pattern during stand-pivot transfer bed<>chair. Lower Body Dressing Assistance  Socks: Maximum assistance (d/t decreased sitting balance)  Leg Crossed Method Used: Yes  Position Performed: Seated edge of bed  Cues: Don;Physical assistance;Verbal cues provided;Visual cues provided     Functional Measure:  Barthel Index:  Bathin  Bladder: 5  Bowels: 5  Groomin  Dressin  Feeding: 10  Mobility: 0  Stairs: 0  Toilet Use: 5  Transfer (Bed to Chair and Back): 5  Total: 35/100      The Barthel ADL Index: Guidelines  1. The index should be used as a record of what a patient does, not as a record of what a patient could do. 2. The main aim is to establish degree of independence from any help, physical or verbal, however minor and for whatever reason. 3. The need for supervision renders the patient not independent. 4. A patient's performance should be established using the best available evidence. Asking the patient, friends/relatives and nurses are the usual sources, but direct observation and common sense are also important. However direct testing is not needed. 5. Usually the patient's performance over the preceding 24-48 hours is important, but occasionally longer periods will be relevant. 6. Middle categories imply that the patient supplies over 50 per cent of the effort. 7. Use of aids to be independent is allowed. Score Interpretation (from [address] [address])    Independent   60-79 Minimally independent   40-59 Partially dependent   20-39 Very dependent   <20 Totally dependent     -Tom Reis., Barthel, D.W. (1965). Functional evaluation: the Barthel Index. [address] [facility] ([address] (1997). The Barthel activities of daily living index: self-reporting versus actual performance in the old (> or = 75 years).  Journal of American Geriatric Society 45(7), 14 [facility], SASKIA, Nikolai Dietz., Aria Harris. (1999). Measuring the change in disability after inpatient rehabilitation; comparison of the responsiveness of the Barthel Index and Functional [place] Measure. Journal of Neurology, Neurosurgery, and Psychiatry, 66(4), 126-180. YONIS Olivarez, NAHID Don, & Carolin Goode M.A. (2004) Assessment of post-stroke quality of life in cost-effectiveness studies: The usefulness of the Barthel Index and the EuroQoL-5D. Quality of Life Research, 15, 952-95       Occupational Therapy Evaluation Charge Determination   History Examination Decision-Making   MEDIUM Complexity : Expanded review of history including physical, cognitive and psychosocial  history  MEDIUM Complexity : 3-5 performance deficits relating to physical, cognitive , or psychosocial skils that result in activity limitations and / or participation restrictions MEDIUM Complexity : Patient may present with comorbidities that affect occupational performnce. Miniml to moderate modification of tasks or assistance (eg, physical or verbal ) with assesment(s) is necessary to enable patient to complete evaluation       Based on the above components, the patient evaluation is determined to be of the following complexity level: MEDIUM  Pain Rating:  Denied pain. Activity Tolerance:   Good     After treatment patient left in no apparent distress:    Sitting in chair, Call bell within reach and Bed / chair alarm activated    COMMUNICATION/EDUCATION:   The patients plan of care was discussed with: Physical therapist, Registered nurse and Patient.      Thank you for this referral.  Armando King OTR/L  Time Calculation: 23 mins

## 2022-02-05 NOTE — PROGRESS NOTES
Problem: Falls - Risk of  Goal: *Absence of Falls  Description: Document Merle Koch Fall Risk and appropriate interventions in the flowsheet.   Outcome: Progressing Towards Goal  Note: Fall Risk Interventions:  Mobility Interventions: Bed/chair exit alarm              Elimination Interventions: Call light in reach    History of Falls Interventions: Bed/chair exit alarm,Evaluate medications/consider consulting pharmacy,Room close to nurse's station,Utilize gait belt for transfer/ambulation

## 2022-02-05 NOTE — PROGRESS NOTES
Problem: Mobility Impaired (Adult and Pediatric)  Goal: *Acute Goals and Plan of Care (Insert Text)  Description: FUNCTIONAL STATUS PRIOR TO ADMISSION: Patient resides in [address] [place] [address] and requires assist for functional transfers to w/c and assist with ADLs    HOME SUPPORT PRIOR TO ADMISSION: Pt receives assist at LTC facility    Physical Therapy Goals  Initiated 2/5/2022  1. Patient will move from supine to sit and sit to supine  in bed with supervision/set-up within 7 day(s). 2.  Patient will transfer from bed to chair and chair to bed with moderate assistance  using the least restrictive device within 7 day(s). 3.  Patient will perform sit to stand with moderate assistance  within 7 day(s). Outcome: Not Met    PHYSICAL THERAPY EVALUATION  Patient: Leda Jalloh (28 y.o. female)  Date: 2/5/2022  Primary Diagnosis: Ascites [R18.8]  Hyponatremia [E87.1]        Precautions: fall risk       ASSESSMENT  Based on the objective data described below, the patient presents with decrease in LE strength, impaired balance and increased need for assist with bed mobility and transfers s/p hospitalization d/t ascites. Patient educated on the purpose and benefits of skilled PT and goals to be achieved with pt verbalizing good understanding. Pt directed in supine to sit with min A to establish GRECIA. Pt directed in scooting fwd to EOB to place feet on floor to prep for transfer. Pt directed in sit to stand transfer with max A with pt's feet sliding fwd initially. PT blocked pt's feet and then stood and transferred pt to chair with total assist and pt demo retropulsion and trunk/hip extension. Pt requires cues for progressing UEs with minimal follow through. Pt positioned in chair comfortably with all needs met. Current Level of Function Impacting Discharge (mobility/balance): max-total assist transfers    Functional Outcome Measure:   The patient scored 35/100 on the Modified Barthel Index outcome measure which is indicative of pt is 65% dependent on others for assist with ADLs and functional mobility. Other factors to consider for discharge: pt presents below baseline function     Patient will benefit from skilled therapy intervention to address the above noted impairments. PLAN :  Recommendations and Planned Interventions: bed mobility training, transfer training, therapeutic exercises, neuromuscular re-education, patient and family training/education, and therapeutic activities      Frequency/Duration: Patient will be followed by physical therapy:  3 times a week to address goals. Recommendation for discharge: (in order for the patient to meet his/her long term goals)  Therapy up to 5 days/week in SNF setting    This discharge recommendation:  Has been made in collaboration with the attending provider and/or case management    IF patient discharges home will need the following DME: patient owns DME required for discharge         SUBJECTIVE:   Patient stated I am afraid of falling.     OBJECTIVE DATA SUMMARY:   HISTORY:    Past Medical History:   Diagnosis Date    Ascites 01/31/2022    7100ml removed by paracentesis    B12 deficiency     Breast cancer ([facility] [address]     COPD (chronic obstructive pulmonary disease) (HCC)     HTN (hypertension)     Hyperlipidemia     Myocardial infarction [facility]) 2017    Dr. Brenton CARBAJAL (nonalcoholic steatohepatitis) 10/2021    Dr. Olimpia Adrian    Recurrent falls      Past Surgical History:   Procedure Laterality Date    HX BILATERAL MASTECTOMY      HX CORONARY STENT PLACEMENT         Personal factors and/or comorbidities impacting plan of care: lives in [place]: Long term care  Support Systems: Other Family Member(s)  Patient Expects to be Discharged to[de-identified] [address] [facility]    EXAMINATION/PRESENTATION/DECISION MAKING:   Critical Behavior:  Neurologic State: Alert  Orientation Level: Oriented to person,Oriented to time,Disoriented to place,Disoriented to situation (thought she was at [facility] and had surgery)  Cognition: Follows commands,No command following     Hearing: Auditory  Auditory Impairment: None  Skin:    Edema:   Range Of Motion:  AROM: Generally decreased, functional (BUEs, BLEs grossly decreased)                       Strength:    Strength: Grossly decreased, non-functional                    Tone & Sensation:   Tone: Normal              Sensation: Intact               Coordination:  Coordination: Grossly decreased, non-functional  Vision:   Acuity: Within Defined Limits;Able to read clock/calendar on wall without difficulty; Able to read employee name badge without difficulty  Corrective Lenses: Glasses  Functional Mobility:  Bed Mobility:     Supine to Sit: Minimum assistance     Scooting: Contact guard assistance  Transfers:  Sit to Stand: Maximum assistance  Stand to Sit: Total assistance        Bed to Chair: Total assistance;Assist x1 (SPT)              Balance:   Sitting: Impaired  Sitting - Static: Fair (occasional)  Sitting - Dynamic: Fair (occasional)  Standing: Impaired; With support (SPT bed<>chair)  Standing - Static: Poor;Constant support  Standing - Dynamic : Poor;Constant support  Ambulation/Gait Training:                                                         Stairs: Therapeutic Exercises:       Functional Measure:  Barthel Index:    Bathin  Bladder: 5  Bowels: 5  Groomin  Dressin  Feeding: 10  Mobility: 0  Stairs: 0  Toilet Use: 5  Transfer (Bed to Chair and Back): 5  Total: 35/100       The Barthel ADL Index: Guidelines  1. The index should be used as a record of what a patient does, not as a record of what a patient could do. 2. The main aim is to establish degree of independence from any help, physical or verbal, however minor and for whatever reason. 3. The need for supervision renders the patient not independent. 4. A patient's performance should be established using the best available evidence.  Asking the patient, friends/relatives and nurses are the usual sources, but direct observation and common sense are also important. However direct testing is not needed. 5. Usually the patient's performance over the preceding 24-48 hours is important, but occasionally longer periods will be relevant. 6. Middle categories imply that the patient supplies over 50 per cent of the effort. 7. Use of aids to be independent is allowed. Score Interpretation (from [address] [facility] [address])    Independent   60-79 Minimally independent   40-59 Partially dependent   20-39 Very dependent   <20 Totally dependent     -Tom Reis., Barthel, D.W. (1965). Functional evaluation: the Barthel Index. [address] ([address] (1997). The Barthel activities of daily living index: self-reporting versus actual performance in the old (> or = 75 years). Journal of [address] [place] [address] 45(7), [facility], ANEUDYF, Aminata Beltran., Romana Just. (1999). Measuring the change in disability after inpatient rehabilitation; comparison of the responsiveness of the Barthel Index and Functional [place] Measure. Journal of Neurology, Neurosurgery, and Psychiatry, 66(4), 299-950. TIN Olivier.DELLA, Messi Dickerson,  LIZZIE.MILEY, & Sophy Jacques, M.A. (2004) Assessment of post-stroke quality of life in cost-effectiveness studies: The usefulness of the Barthel Index and the EuroQoL-5D.  Quality of Life Research, 15, 865-94           Physical Therapy Evaluation Charge Determination   History Examination Presentation Decision-Making   LOW Complexity : Zero comorbidities / personal factors that will impact the outcome / POC LOW Complexity : 1-2 Standardized tests and measures addressing body structure, function, activity limitation and / or participation in recreation  LOW Complexity : Stable, uncomplicated  LOW Complexity : FOTO score of       Based on the above components, the patient evaluation is determined to be of the following complexity level: LOW     Pain Ratin/10    Activity Tolerance:   Fair    After treatment patient left in no apparent distress:   Sitting in chair, Call bell within reach, and Bed / chair alarm activated    COMMUNICATION/EDUCATION:   The patients plan of care was discussed with: Occupational therapist and Registered nurse. Fall prevention education was provided and the patient/caregiver indicated understanding., Patient/family have participated as able in goal setting and plan of care. , and Patient/family agree to work toward stated goals and plan of care.     Thank you for this referral.  Klaus Jin, PT   Time Calculation: 24 mins

## 2022-02-06 NOTE — PROGRESS NOTES
I reviewed pertinent labs and imaging, and discussed /agreed on the plan of care with Dr. Vini Ramirez.       Hospitalist Progress Note    NAME: Britney Hensley   :  1950   MRN:  [id number]       Excerpted HPI and summary of hospital course:   \"Terra Schroeder is a 73 y.o. female with liver biopsy proven liver cirrhosis 2021, seen by Dr. Maira Jensen 10/21, recent admission  for recurrent falls, generalized weakness from B12 deficiency sent from [address] [place] [address],[address] for recurrent abdominal distention. Parviz Sexton is getting rehab since discharge on 21. Jimmy Yepez previously not had decompensated liver cirrhosis.  Had thrombocytopenia and mild coagulopathy.  On 22, sent to ER for abdominal distention.  Had US guided paracentesis with removal of 7100ml clear yellow ascites.  At SNF, she is on aldactone 25mg and lasix 20mg daily.      She is sent back to ER today for recurrent ascites with recurrent increased abdominal distention.  Denies fever, chills, n/v.  CT abdomen without contrast with cirrhosis, moderate to large volume ascites, wall thickening involving ascending and transverse colon.     She denies any abdominal pain, diarrhea, rectal bleeding.     We were asked to admit for work up and evaluation of the above problems. \"      Assessment / Plan:   Cirrhosis secondary to CARBAJAL with ascites   -GI is on board and to re-eval in AM- appreciate the recommendations   -T bili 6.2 albumin 2, ALT 56, , ALK phos 127, INR 1.4  -Her sister reports that she is Hep C positive   -Ammonia is 27  -Status post ultrasound-guided paracentesis in the ER which yielded approximately 5600 mL of fluid  -Peritoneal fluid culture NGTD  -SAAG 1.6 suggests portal HTN   -+ Fluid wave and some abd tenderness on exam, but eating and drinking well, no nausea/vomiting/fevers/chills.   -Continue Lasix, Xifaxan, spironolactone  -Palliative care consult   -Nursing reports elevated bladder scan result- suspect may be picking up peritoneal fluid, but reportedly with little UO over the past several hours- straight cath x 1      Hypervolemic hyponatremia, likely secondary to intravascular fluid depletion from third spacing, improving  -Na 130 from 133  -Will add 1200 mL fluid restriction   -Consider nephrology consult  if worsens   -Repeat labs in AM      Thrombocytopenia  -Secondary to cirrhosis  -Platelets stable at 81  -Monitor for signs or symptoms of bleeding     Wall thickening involving ascending and transverse colon on CT scan  -Likely on the basis of portal colopathy  -GI is on board   -WBC 7.6, remains afebrile     Recurrent falls, generalized weakness from B12 deficiency  -Continue vitamin B12 injections     History of COPD, former smoker  -Continue Pulmicort     Hypothyroidism  -Continue Synthroid     Hypertension  -Continue on Lasix, Aldactone      Hyperlipidemia  -No PTA med listed for this, likely because of her cirrhosis      History of MI status post stents  -Continue aspirin      25.0 - 29.9 Overweight / Body mass index is 25.58 kg/m². Estimated discharge date: February 8  Barriers: Medical readiness    Code status: Full  Prophylaxis: SCD's  Recommended Disposition: SNF/LTC     Subjective:     Chief Complaint / Reason for Physician Visit  Hospital follow up regarding: recurrent abdominal distention. She is seen laying in bed. Slightly confused. Eating and drinking well, no nausea/vomiting/fevers/chills. Updated to plan of care; she verbalized understanding. Discussed with RN events overnight. Review of Systems:  Symptom Y/N Comments  Symptom Y/N Comments   Fever/Chills N   Chest Pain N    Poor Appetite N   Edema N    Cough N   Abdominal Pain N    Sputum N   Joint Pain N    SOB/CHAN N   Pruritis/Rash N    Nausea/vomit N   Tolerating PT/OT Y    Diarrhea N   Tolerating Diet Y    Constipation N   Other       Could NOT obtain due to: N/A     Objective:     VITALS:   Last 24hrs VS reviewed since prior progress note.  Most recent are:  Patient Vitals for the past 24 hrs:   Temp Pulse Resp BP SpO2   02/06/22 0727 97.3 °F (36.3 °C) 78 18 123/72 100 %   02/06/22 0718 -- -- -- -- 97 %   02/05/22 2226 98 °F (36.7 °C) 82 15 (!) 115/58 96 %   02/05/22 1934 97.7 °F (36.5 °C) 87 18 120/66 100 %   02/05/22 1910 -- -- -- -- 98 %   02/05/22 1617 97.5 °F (36.4 °C) 90 18 122/75 99 %     No intake or output data in the 24 hours ending 02/06/22 0836     I had a face to face encounter and independently examined this patient on 2/6/2022, as outlined below:  PHYSICAL EXAM:  General: Alert, cooperative, no acute distress    EENT:  Icteric sclerae. Dry MM  Resp:  CTA bilaterally, no wheezing or rales. No accessory muscle use  CV:  Regular  rhythm,  No murmurs, rubs, gallops  GI:  Soft, Distended, Non tender. +Bowel sounds +Fluid wave   Neurologic:  Alert and oriented to person, thought it was March 2022, thought she was at   [facility]. No asterixis elicited. Psych:   Not anxious nor agitated  Skin:  No rashes. MSK:  Moves all extremities  PVS:   Palpable pulses, no peripheral      Reviewed most current lab test results and cultures  YES  Reviewed most current radiology test results   YES  Review and summation of old records today    NO  Reviewed patient's current orders and MAR    YES  PMH/SH reviewed - no change compared to H&P  ________________________________________________________________________  Care Plan discussed with:    Comments   Patient Y    Family  Y Sister, [address] [facility] team rounds were held today with , nursing, pharmacist and clinical coordinator. Patient's plan of care was discussed; medications were reviewed and discharge planning was addressed.      ________________________________________________________________________      Comments   >50% of visit spent in counseling and coordination of care Y ________________________________________________________________________  Papa Edwards NP     Procedures: see electronic medical records for all procedures/Xrays and details which were not copied into this note but were reviewed prior to creation of Plan. LABS:  I reviewed today's most current labs and imaging studies.   Pertinent labs include:  Recent Labs     02/06/22  0737 02/05/22  1256 02/04/22  0934   WBC 7.6 7.8 9.4   HGB 11.9 11.7 12.2   HCT 34.7* 34.0* 35.0   PLT 81* 87* 105*     Recent Labs     02/06/22  0737 02/05/22  1256 02/04/22  1145 02/04/22  1023   * 133*  --  131*   K 4.1 3.9  --  3.7   CL 99 101  --  99   CO2 26 25  --  26   GLU 86 94  --  89   BUN 22* 19  --  21*   CREA 0.86 0.79  --  0.87   CA 8.7 8.3*  --  7.8*   ALB  --  3.2*  --  2.0*   TBILI  --  4.9*  --  3.6*   ALT  --  61  --  56   INR 1.4* 1.4* 1.4*  --        Signed: Papa Edwards NP

## 2022-02-06 NOTE — PROGRESS NOTES
End of Shift Note    Bedside shift change report given to Joaquin Barnett RN (oncoming nurse) by Aysha Rhodes RN (offgoing nurse). Report included the following information SBAR, Kardex, Intake/Output and MAR    Shift worked:  4661-6365     Shift summary and any significant changes:     Patient stable through shift, no complaints of pain, all scheduled meds, incontinent care and frequent rounding provided. PT/OT worked with patient and transferred patient to recliner, patient sat in recliner until after dinner, 2 assist to transfer patient back to bed. Concerns for physician to address:       Zone phone for oncoming shift:          Activity:  Activity Level: Up with Assistance  Number times ambulated in hallways past shift: 0  Number of times OOB to chair past shift: 1    Cardiac:   Cardiac Monitoring: No      Cardiac Rhythm: Sinus Rhythm    Access:   Current line(s): PIV     Genitourinary:   Urinary status: external catheter    Respiratory:   O2 Device: None (Room air)  Chronic home O2 use?: NO  Incentive spirometer at bedside: NO     GI:  Last Bowel Movement Date: 02/03/22  Current diet:  ADULT DIET Regular; Low Sodium (2 gm)  Passing flatus: YES  Tolerating current diet: YES       Pain Management:   Patient states pain is manageable on current regimen: YES    Skin:  Mark Score: 16  Interventions: speciality bed, float heels, increase time out of bed, PT/OT consult, internal/external urinary devices and nutritional support     Patient Safety:  Fall Score:  Total Score: 4  Interventions: bed/chair alarm, assistive device (walker, cane, etc), gripper socks and pt to call before getting OOB  High Fall Risk: Yes    Length of Stay:  Expected LOS: - - -  Actual LOS: 1      Aysha Rhodes RN

## 2022-02-06 NOTE — PROGRESS NOTES
End of Shift Note    Bedside shift change report given to GUSTAVO Martines  (oncoming nurse) by Clau Peter RN (offgoing nurse). Report included the following information SBAR, Kardex, ED Summary, Procedure Summary, Intake/Output and MAR    Shift worked:  2719-2162     Shift summary and any significant changes:     Pt remained stable throughout shift and had no complaints of pain. Safety rounding and toileting needs were done.       Concerns for physician to address:       Zone phone for oncoming shift:            Clau Peter RN

## 2022-02-07 NOTE — PROGRESS NOTES
End of Shift Note    Bedside shift change report given to Patricia (oncoming nurse) by Neil Coronado RN (offgoing nurse). Report included the following information SBAR, Kardex, ED Summary, Procedure Summary, Intake/Output and MAR    Shift worked:  5670-2671     Shift summary and any significant changes:     Pt remained stable throughout shift, no changes to note.       Concerns for physician to address:       Zone phone for oncoming shift:            Neil Coronado RN

## 2022-02-07 NOTE — PROGRESS NOTES
Problem: Mobility Impaired (Adult and Pediatric)  Goal: *Acute Goals and Plan of Care (Insert Text)  Description: FUNCTIONAL STATUS PRIOR TO ADMISSION: Patient resides in [address] [place] [address] and requires assist for functional transfers to w/c and assist with ADLs    HOME SUPPORT PRIOR TO ADMISSION: Pt receives assist at LTC facility    Physical Therapy Goals  Initiated 2/5/2022  1. Patient will move from supine to sit and sit to supine  in bed with supervision/set-up within 7 day(s). 2.  Patient will transfer from bed to chair and chair to bed with moderate assistance  using the least restrictive device within 7 day(s). 3.  Patient will perform sit to stand with moderate assistance  within 7 day(s). Outcome: Not Met     PHYSICAL THERAPY TREATMENT  Patient: Jostin Collins (79 y.o. female)  Date: 2/7/2022  Diagnosis: Ascites [R18.8]  Hyponatremia [E87.1] <principal problem not specified>       Precautions:  Fall ; chair alarm  Chart, physical therapy assessment, plan of care and goals were reviewed. ASSESSMENT  Nurse clears pt for mobility. Patient continues with skilled PT services and is slowly progressing towards goals. Pt continues with posterior lean initially upon sitting with max., multi-modal cuing for mid-line positioning and additional time needed. Pt also with posterior lean in standing and with gait bed to chair efforts. She is unable to self-correct posterior lean with WB positioning despite cuing. Will continue to follow. Current Level of Function Impacting Discharge (mobility/balance): bed mob. Mod. Assist ; transfers and gait bed to chair RW use and max. assist    Other factors to consider for discharge: pt in process of transition to LTC at [address] [place] [address] Ave :  Patient continues to benefit from skilled intervention to address the above impairments. Continue treatment per established plan of care. to address goals.     Recommendation for discharge: (in order for the patient to meet his/her long term goals)  To be determined: ? LTC after back to SNF [facility])    This discharge recommendation:  Has been made in collaboration with the attending provider and/or case management    IF patient discharges home will need the following DME: to be determined (TBD)       SUBJECTIVE:   Patient stated Okay.   Pt's daughter present    OBJECTIVE DATA SUMMARY:   Critical Behavior:  Neurologic State: Alert  Orientation Level: Oriented X4  Cognition: Follows commands     Functional Mobility Training:  Bed Mobility:     Supine to Sit: Moderate assistance; Additional time     Scooting: Moderate assistance; Additional time     Interventions: Tactile cues; Verbal cues    Transfers:  Sit to Stand: Maximum assistance; Additional time;Assist x2  Stand to Sit: Maximum assistance;Assist x2        Bed to Chair: Maximum assistance; Additional time;Assist x2                    Balance:  Sitting: Impaired  Sitting - Static: Fair (occasional); Poor (constant support) (initially poor with R lateral and posterior leaning)  Standing: Impaired; With support  Standing - Static: Constant support;Poor (RW)  Standing - Dynamic : Constant support;Poor (RW)    Ambulation/Gait Training:  Distance (ft): 5 Feet (ft) (bed to bedside chair)  Assistive Device: Gait belt;Walker, rolling           Gait Abnormalities: Decreased step clearance;Shuffling gait; Other (mod./max. posterior lean)        Base of Support: Widened     Speed/Winnie: Shuffled; Slow  Step Length: Left shortened;Right shortened        Interventions: Safety awareness training; Tactile cues; Verbal cues           Pain Rating:  No c/o pain    Activity Tolerance:   Fair    After treatment patient left in no apparent distress:   Sitting in chair, Call bell within reach, Bed / chair alarm activated, Caregiver / family present, and nurse notified    COMMUNICATION/COLLABORATION:   The patients plan of care was discussed with: Registered nurse and Rehabilitation technician.      Travis Oconnor, PT, DPT Time Calculation: 16 mins

## 2022-02-07 NOTE — ACP (ADVANCE CARE PLANNING)
Advance Care Planning   Advance Care Planning Inpatient Note  [address]  Spiritual Care Department    Today's Date: 2/7/2022  Unit: [facility] MEDICAL ONCOLOGY    Received request from In Basket. Upon review of chart and communication with care team, patient's decision making abilities are not in question. Patient and Sister was/were present in the room during visit. Goals of ACP Conversation:  Discuss Advance Care planning documents    Health Care Decision Makers:      Primary Decision Maker: Jacinta Holm - Sister - [phone]    Secondary Decision Maker: Thea Mcclain - Other Relative - [phone]      Summary:  Completed New Documents    Advance Care Planning Documents (Patient Wishes) on file:  Healthcare Power of /Advance Directive appointment of Health care agent  Living Will/ Advance Directive  Anatomical Gift/Organ donation     Assessment:     responded to th In Basket AMD assistance request for Ms. Roya Pina in 1114. Patient was alert and oriented, her sister Pancho Garcia was supportively present.  introduced and provided info about AMD, they wanted to fill out the form with the assistance of .  and Pancho Garcia helped her and explained each question and explored her decision and feelings, she completed the document with deferring the Living Will section. New AMD is completed.       Interventions:  Provided education on documents for clarity and greater understanding  Assisted in the completion of documents according to patient's wishes at this time    Care Preferences Communicated:  No    Outcomes/Plan:  New Advance Directive completed  Returned original document(s) to patient, as well as copies for distribution to appointed agents  Copy of Advance Directive given to staff to scan into medical record    [facility] on 2/7/2022 at 3:22 PM

## 2022-02-07 NOTE — PROGRESS NOTES
End of Shift Note    Bedside shift change report given to Neda (oncoming nurse) by John Tapia RN (offgoing nurse).   Report included the following information SBAR, Kardex, ED Summary, Intake/Output, MAR and Recent Results    Shift worked:  2911-3231     Shift summary and any significant changes:    Pt remained stable through shift, no c/o pain, pt to be NPO at midnight for paracentesis tomorrow   Concerns for physician to address: none   Zone phone for oncoming shift:  none         John Tapia, RN

## 2022-02-07 NOTE — PROGRESS NOTES
Problem: Pressure Injury - Risk of  Goal: *Prevention of pressure injury  Description: Document Mark Scale and appropriate interventions in the flowsheet.   Outcome: Progressing Towards Goal  Note: Pressure Injury Interventions:  Sensory Interventions: Assess changes in LOC    Moisture Interventions: Absorbent underpads,Internal/External urinary devices    Activity Interventions: Increase time out of bed,Pressure redistribution bed/mattress(bed type)    Mobility Interventions: HOB 30 degrees or less    Nutrition Interventions: Document food/fluid/supplement intake,Offer support with meals,snacks and hydration    Friction and Shear Interventions: HOB 30 degrees or less,Foam dressings/transparent film/skin sealants,Apply protective barrier, creams and emollients

## 2022-02-07 NOTE — PROGRESS NOTES
Spiritual Care Assessment/Progress Note  [facility]      NAME: Karen Bond      MRN: [id number]  AGE: 73 y.o. SEX: female  Lutheran Affiliation: No Caodaism   Language: English     2/7/2022     Total Time (in minutes): 61     Spiritual Assessment begun in [facility] [place] through conversation with:         [x]Patient        [x] Family    [] Friend(s)        Reason for Consult: Advance medical directive consult     Spiritual beliefs: (Please include comment if needed)     [] Identifies with a ioana tradition:         [] Supported by a ioana community:            [] Claims no spiritual orientation:           [] Seeking spiritual identity:                [] Adheres to an individual form of spirituality:           [x] Not able to assess:                           Identified resources for coping:      [] Prayer                               [] Music                  [] Guided Imagery     [x] Family/friends                 [] Pet visits     [] Devotional reading                         [] Unknown     [] Other:                                               Interventions offered during this visit: (See comments for more details)    Patient Interventions: Affirmation of emotions/emotional suffering,Advance medical directive completed,Advance medical directive consult,Catharsis/review of pertinent events in supportive environment,Coping skills reviewed/reinforced,Life review/legacy,Initial/Spiritual assessment, patient floor     Family/Friend(s):  Affirmation of emotions/emotional suffering,Advance medical directive consult,Advance medical directive completed,Catharsis/review of pertinent events in supportive environment,Coping skills reviewed/reinforced     Plan of Care:     [x] Support spiritual and/or cultural needs    [x] Support AMD and/or advance care planning process      [] Support grieving process   [] Coordinate Rites and/or Rituals    [] Coordination with community clergy   [] No spiritual needs identified at this time   [] Detailed Plan of Care below (See Comments)  [] Make referral to Music Therapy  [] Make referral to Pet Therapy     [] Make referral to Addiction services  [] Make referral to [facility]  [] Make referral to Spiritual Care Partner  [] No future visits requested        [x] Contact Spiritual Care for further referrals     Comments:      responded to th In Basket AMD assistance request for Ms. Anabelle Barnes in 1114. Patient was alert and oriented, her sister Radha Gomez was supportively present.  introduced and provided info about AMD, they wanted to fill out the form with the assistance of .  and Radha Patricia helped her and explained each question and explored her decision and feelings, she completed the document with deferring the Living Will section. New AMD is completed.  provided the opportunity of life review, they shared that they have total six but three of boys gone, three girls left. Pt is the middle among six, Radha Gomez is the youngest. Medardo Rosales advised of ongoing support availability, they were thankful for 's support.     [id number] [facility] [address] Provider   Paging Service [phone])

## 2022-02-07 NOTE — CONSULTS
Palliative Medicine Consult  [place]: [phone] ([phone])    Patient Name: Ashley Rivas  YOB: 1950    Date of Initial Consult: 2/7/22  Reason for Consult: Care, decisons  Requesting Provider: Jennifer Maher NP  Primary Care Physician: Harsha Villar MD     SUMMARY:   Ashley Rivas is a 73 y.o. female with a past history of Dewane Rang (liver cirrhosis biopsy 8/2021), recent onset of ascites 1/2022, follows up with Dr Dickson Pineda , B12 defieciency on B12 replacement, COPD, CAD,  reccurent falls and generalized weakness admIted 12/1/-12/6 d/c to rehab,  who was admitted on 2/4/2022 from [address] [place] [address]  with a diagnosis of abdominal distension with recurrent ascites, requiring US guided paracentesis on 2/4/22 with removal of 5600 ml of fluid. Other PMH : depression and hypothyroidism. Current medical issues leading to Palliative Medicine involvement include:decompensated cirrhosis with recurrent ascites, debility, address goals of care needs advance medical directives. Social hx : patient is single, no children, was living in apartment by herself until few months ago when she started getting weak and had few fall . Her sister Olimpia France is main support . PALLIATIVE DIAGNOSES:   1. Debility (Per PT notes, patient is 65% dependant on others forADLS). 2. Weakness  3. Poor appetite  4. Abdominal distension   5. Palliative care encounter   6. DNR discussion        PLAN:   1. Met with patient with her sister Pipe Irizarry. 2. Patient is in recliner, alert, oriented to person an dplace, mentation is slow, able to make medical decisions . 3. We discuss her medical course, and progressive nature of cirrhosis, she understand fluid in her belly will recur, emphasize the importance with close follow up with Dr Dickson Lemos , sister is concerned her next follow up is in March and she will need early follow up, for which sister is going to contact Dr Mo Leblanc office.   4.  Advance care planning : we discuss importance of designation MPOA and end of life preferences, she would like to appoint her sister Mary Mercer as primary MPOA, we agreed to follow up tomorrow at 2: 30 pm for completion of medical directives. 5. DNR discussion : reviewed CPR facts, suggested to protect her form CPR, because the chances of survival to meaningful quality of life is negligible, she wants to discuss with her sister , we will follow tomorrow to continue the dialogue . 6. Poor oral intake ; dietary consult   7. Initial consult note routed to primary continuity provider and/or primary health care team members  8. Communicated plan of care with: Palliative IDT, [facility] Team     GOALS OF CARE / TREATMENT PREFERENCES:     GOALS OF CARE:  Patient/Health Care Proxy Stated Goals:  (best possible recovery)    TREATMENT PREFERENCES:   Code Status: Full Code    Patient and family's personal goals include: best possible recovery     Important upcoming milestones or family events: none     The patient identifies the following as important for living well: get stronger       Advance Care Planning:  [] [facility] Interdisciplinary Team has updated the ACP Navigator with Health Care Decision Maker and Patient Capacity      Advance Care Planning 2/7/2022   Confirm Advance Directive None   Patient Would Like to Complete Advance Directive Yes       Medical Interventions:  (contemplating DNR)       Other:    As far as possible, the palliative care team has discussed with patient / health care proxy about goals of care / treatment preferences for patient. HISTORY:     History obtained from: chart, bed side Rn, patient and her sister     CHIEF COMPLAINT: weak    HPI/SUBJECTIVE:    The patient is:   [] Verbal and participatory; patient is feeling weak , appetite is very poor, sister notes she gets \" full easily, fluid in belly is building again, she denies any abdominal pain or discomfort. Bowels are moving.   Patient notes incoordination of gait     Clinical Pain Assessment (nonverbal scale for severity on nonverbal patients):   Clinical Pain Assessment  Severity: 0          Duration: for how long has pt been experiencing pain (e.g., 2 days, 1 month, years)  Frequency: how often pain is an issue (e.g., several times per day, once every few days, constant)     FUNCTIONAL ASSESSMENT:     Palliative Performance Scale (PPS):          PSYCHOSOCIAL/SPIRITUAL SCREENING:     Palliative IDT has assessed this patient for cultural preferences / practices and a referral made as appropriate to needs (Cultural Services, Patient Advocacy, Ethics, etc.)    Any spiritual / Faith concerns:  [] Yes /  [x] No   If \"Yes\" to discuss with pastoral care during IDT     Does caregiver feel burdened by caring for their loved one:   [] Yes /  [x] No /  [] No Caregiver Present    If \"Yes\" to discuss with social work during IDT    Anticipatory grief assessment:   [x] Normal  / [] Maladaptive     If \"Maladaptive\" to discuss with social work during IDT    ESAS Anxiety: Anxiety: 0    ESAS Depression: Depression: 0        REVIEW OF SYSTEMS:     Positive and pertinent negative findings in ROS are noted above in HPI. The following systems were [x] reviewed / [] unable to be reviewed as noted in HPI  Other findings are noted below. Systems: constitutional, ears/nose/mouth/throat, respiratory, gastrointestinal, genitourinary, musculoskeletal, integumentary, neurologic, psychiatric, endocrine. Positive findings noted below. Modified ESAS Completed by: provider   Fatigue: 9 Drowsiness: 0   Depression: 0 Pain: 0   Anxiety: 0 Nausea: 1   Anorexia: 8 Dyspnea: 0     Constipation: No              PHYSICAL EXAM:     From RN flowsheet:  Wt Readings from Last 3 Encounters:   02/04/22 149 lb (67.6 kg)   01/31/22 160 lb (72.6 kg)   12/06/21 154 lb 4.8 oz (70 kg)     Blood pressure 122/65, pulse 84, temperature 97.4 °F (36.3 °C), resp.  rate 16, height 5' 4\" (1.626 m), weight 149 lb (67.6 kg), SpO2 95 %. Pain Scale 1: Numeric (0 - 10)  Pain Intensity 1: 0                 Last bowel movement, if known:     Constitutional: frail, appears older to stated age, cachectic, some confusion   Eyes: pupils equal, anicteric  ENMT: no nasal discharge, moist mucous membranes  Cardiovascular: regular rhythm, no edema   Respiratory: breathing not labored, symmetric  Gastrointestinal: soft distended, non-tender, +bowel sounds  Musculoskeletal:  Generalized , muscular wasting, no deformity, no tenderness to palpation  Skin: warm, dry  Neurologic: following commands, moving all extremities, mentation is slow  Psychiatric: full affect, no hallucinations  Other:       HISTORY:     Active Problems:    Cirrhosis of liver with ascites (HCC) (2022)      Hyponatremia (2022)      Ascites (2022)      Past Medical History:   Diagnosis Date    Ascites 2022    7100ml removed by paracentesis    B12 deficiency     [facility]     COPD [facility]chronic obstructive pulmonary disease) [facility]     HTN (hypertension)     Hyperlipidemia     Myocardial infarction ([facility]     Dr. Anabella CARBAJAL (nonalcoholic steatohepatitis) 10/2021    Dr. Pillo Campa Recurrent falls       Past Surgical History:   Procedure Laterality Date    HX BILATERAL MASTECTOMY      HX CORONARY STENT PLACEMENT        Family History   Problem Relation Age of Onset    Emphysema Mother     Heart Attack Father     Breast Cancer Sister       History reviewed, no pertinent family history.   Social History     Tobacco Use    Smoking status: Former Smoker     Packs/day: 1.00     Years: 42.00     Pack years: 42.00     Types: Cigarettes     Quit date: 2015     Years since quittin.7    Smokeless tobacco: Never Used   Substance Use Topics    Alcohol use: Never     No Known Allergies   Current Facility-Administered Medications   Medication Dose Route Frequency    spironolactone (ALDACTONE) tablet 50 mg  50 mg Oral DAILY    furosemide (LASIX) tablet 40 mg  40 mg Oral BID    aspirin delayed-release tablet 81 mg  81 mg Oral DAILY    bisacodyL (DULCOLAX) suppository 10 mg  10 mg Rectal DAILY PRN    lactulose (CHRONULAC) 10 gram/15 mL solution 30 mL  20 g Oral DAILY PRN    levothyroxine (SYNTHROID) tablet 112 mcg  112 mcg Oral ACB    magnesium hydroxide (MILK OF MAGNESIA) 400 mg/5 mL oral suspension 30 mL  30 mL Oral DAILY PRN    ondansetron (ZOFRAN ODT) tablet 4 mg  4 mg Oral Q6H PRN    polyethylene glycol (MIRALAX) packet 17 g  17 g Oral DAILY PRN    potassium chloride (K-DUR, KLOR-CON M20) SR tablet 20 mEq  20 mEq Oral DAILY    sertraline (ZOLOFT) tablet 50 mg  50 mg Oral DAILY    budesonide (PULMICORT) 250 mcg/2ml nebulizer susp  250 mcg Nebulization BID RT    rifAXIMin (XIFAXAN) tablet 550 mg  550 mg Oral BID    cyanocobalamin (VITAMIN B12) injection 1,000 mcg  1,000 mcg IntraMUSCular Q30D          LAB AND IMAGING FINDINGS:     Lab Results   Component Value Date/Time    WBC 8.5 02/07/2022 05:20 AM    HGB 11.0 (L) 02/07/2022 05:20 AM    PLATELET 85 (L) 29/86/1916 05:20 AM     Lab Results   Component Value Date/Time    Sodium 130 (L) 02/07/2022 05:20 AM    Potassium 4.5 02/07/2022 05:20 AM    Chloride 102 02/07/2022 05:20 AM    CO2 22 02/07/2022 05:20 AM    BUN 21 (H) 02/07/2022 05:20 AM    Creatinine 0.89 02/07/2022 05:20 AM    Calcium 8.2 (L) 02/07/2022 05:20 AM    Magnesium 2.0 02/07/2022 05:20 AM    Phosphorus 2.7 02/07/2022 05:20 AM      Lab Results   Component Value Date/Time    Alk.  phosphatase 125 (H) 02/05/2022 12:56 PM    Protein, total 7.3 02/05/2022 12:56 PM    Albumin 3.2 (L) 02/05/2022 12:56 PM    Globulin 4.1 (H) 02/05/2022 12:56 PM     Lab Results   Component Value Date/Time    INR 1.4 (H) 02/07/2022 05:20 AM    Prothrombin time 14.6 (H) 02/07/2022 05:20 AM      Lab Results   Component Value Date/Time    Iron 56 10/04/2021 02:21 PM    TIBC 508 (H) 10/04/2021 02:21 PM    Iron % saturation 11 (L) 10/04/2021 02:21 PM    Ferritin 23 (L) 10/04/2021 02:21 PM      No results found for: PH, PCO2, PO2  No components found for: [place]   Lab Results   Component Value Date/Time     12/05/2021 02:36 AM    CK - MB 2.0 12/05/2021 02:36 AM                Total time: 70 MINS  Counseling / coordination time, spent as noted above: 55 MINS  > 50% counseling / coordination?: YES     Prolonged service was provided for  []30 min   []75 min in face to face time in the presence of the patient, spent as noted above. Time Start:   Time End:   Note: this can only be billed with 08010 (initial) or 02703 (follow up). If multiple start / stop times, list each separately.

## 2022-02-07 NOTE — PROGRESS NOTES
End of Shift Note    Bedside shift change report given to Zak Bennett RN (oncoming nurse) by Lena Borden RN (offgoing nurse). Report included the following information SBAR, Kardex, Intake/Output and MAR    Shift worked:  5056-6423     Shift summary and any significant changes:     Patient stable through shift, no complaints of pain. All scheduled meds, incontinent care and frequent rounding provided. Sister at bedside in the afternoon. Concerns for physician to address:       Zone phone for oncoming shift:          Activity:  Activity Level: Up with Assistance  Number times ambulated in hallways past shift: 0  Number of times OOB to chair past shift: 0    Cardiac:   Cardiac Monitoring: No      Cardiac Rhythm: Sinus Rhythm    Access:   Current line(s): PIV     Genitourinary:   Urinary status: external catheter    Respiratory:   O2 Device: None (Room air)  Chronic home O2 use?: NO  Incentive spirometer at bedside: NO     GI:  Last Bowel Movement Date: 02/06/22  Current diet:  ADULT DIET Regular; Low Sodium (2 gm); 1200 ml  Passing flatus: YES  Tolerating current diet: YES       Pain Management:   Patient states pain is manageable on current regimen: YES    Skin:  Mark Score: 16  Interventions: float heels, PT/OT consult, internal/external urinary devices and nutritional support     Patient Safety:  Fall Score:  Total Score: 4  Interventions: assistive device (walker, cane, etc), gripper socks and pt to call before getting OOB  High Fall Risk: Yes    Length of Stay:  Expected LOS: - - -  Actual LOS: 2      Lena Borden RN

## 2022-02-07 NOTE — PROGRESS NOTES
I reviewed pertinent labs and imaging, and discussed /agreed on the plan of care with Dr. Carmen Parry.     Hospitalist Progress Note    NAME: Ashley Malave   :  1950   MRN:  [id number]     Excerpted HPI and summary of hospital course:     \"Terra Schroeder is a 73 y.o. female with liver biopsy proven liver cirrhosis 2021, seen by Dr. Houston Carpenter 10/21, recent admission  for recurrent falls, generalized weakness from B12 deficiency sent from [facility] for recurrent abdominal distention. Candace Bryan is getting rehab since discharge on 21. Echo Leggett previously not had decompensated liver cirrhosis.  Had thrombocytopenia and mild coagulopathy.  On 22, sent to ER for abdominal distention.  Had US guided paracentesis with removal of 7100ml clear yellow ascites.  At SNF, she is on aldactone 25mg and lasix 20mg daily.      She is sent back to ER today for recurrent ascites with recurrent increased abdominal distention.  Denies fever, chills, n/v.  CT abdomen without contrast with cirrhosis, moderate to large volume ascites, wall thickening involving ascending and transverse colon.     She denies any abdominal pain, diarrhea, rectal bleeding.     We were asked to admit for work up and evaluation of the above problems. \"      Assessment / Plan:  Cirrhosis secondary to CARBAJAL with ascites   -GI is on board and to re-eval in AM- appreciate the recommendations   -T bili 6.2 albumin 2, ALT 56, , ALK phos 127, INR 1.4  -Her sister reports that she is Hep C positive   -Ammonia is 27  -Status post ultrasound-guided paracentesis in the ER which yielded approximately 5600 mL of fluid  -Peritoneal fluid culture NGTD  -SAAG 1.6 suggests portal HTN   -+ Fluid wave and some abd tenderness on exam  -No F/C/N/V  -Continue Lasix, Xifaxan, spironolactone  -Palliative care consult pending  -Will have IR do a paracentesis again tomorrow as distension is worsening       Hypervolemic hyponatremia, likely secondary to intravascular fluid depletion from third spacing, improving  -Na remains at 130  -Continue 1200 mL fluid restriction   -Consider nephrology consult  if worsens   -Repeat labs in AM      Thrombocytopenia  -Secondary to cirrhosis  -Platelets stable at 85  -Monitor for signs or symptoms of bleeding     Wall thickening involving ascending and transverse colon on CT scan  -Likely on the basis of portal colopathy  -GI is on board   -WBC 8.6, remains afebrile     Recurrent falls, generalized weakness from B12 deficiency  -Continue vitamin B12 injections     History of COPD, former smoker  -Continue Pulmicort     Hypothyroidism  -Continue Synthroid     Hypertension  -Continue on Lasix, Aldactone      Hyperlipidemia  -No PTA med listed for this, likely because of her cirrhosis      History of MI status post stents  -Continue aspirin      25.0 - 29.9 Overweight / Body mass index is 25.58 kg/m². Estimated discharge date: February 9  Barriers: Medical readiness     Code status: Full  Prophylaxis: SCD's  Recommended Disposition: SNF/LTC     Subjective:     Chief Complaint / Reason for Physician Visit  Hospital follow up regarding: recurrent abdominal distention. She is seen sitting up in the chair. Her sister is at bedside. They just completed advanced directive. States that her abd swelling is worse again today, feels a bit tight and affecting her ability to eat and drink. Her breathing is okay. No F/C. Updated to plan of care; they verbalized understanding. Discussed with RN events overnight.      Review of Systems:  Symptom Y/N Comments  Symptom Y/N Comments   Fever/Chills N   Chest Pain N    Poor Appetite Y SEE ABOVE  Edema N    Cough N   Abdominal Pain - ABD TIGHTNESS   Sputum N   Joint Pain N    SOB/CHAN N   Pruritis/Rash N    Nausea/vomit N   Tolerating PT/OT Y    Diarrhea N   Tolerating Diet     Constipation N   Other       Could NOT obtain due to: N/A     Objective:     VITALS:   Last 24hrs VS reviewed since prior progress note. Most recent are:  Patient Vitals for the past 24 hrs:   Temp Pulse Resp BP SpO2   02/07/22 0813 -- -- -- -- 95 %   02/07/22 0739 97.4 °F (36.3 °C) 84 16 122/65 96 %   02/06/22 2314 98.4 °F (36.9 °C) 90 16 125/70 97 %   02/06/22 2018 98.3 °F (36.8 °C) 91 18 127/69 97 %   02/06/22 1933 -- -- -- -- 97 %   02/06/22 1558 98.2 °F (36.8 °C) 85 18 131/60 99 %     No intake or output data in the 24 hours ending 02/07/22 1452     I had a face to face encounter and independently examined this patient on 2/7/2022, as outlined below:  PHYSICAL EXAM:  General: Alert, cooperative, no acute distress    EENT:  Icteric sclerae. MMM  Resp:  CTA bilaterally, no wheezing or rales. No accessory muscle use  CV:  Regular  rhythm,  No murmurs, rubs, gallops  GI:  Distended, Non tender. +Bowel sounds + fluid wave   Neurologic:  Alert and oriented to month, thought she was in her RAJEEV   Psych:   Not anxious nor agitated  Skin:  No rashes. No jaundice  MSK:  Moves all extremities  PVS:   Palpable pulses, no peripheral edema     Reviewed most current lab test results and cultures  YES  Reviewed most current radiology test results   YES  Review and summation of old records today    NO  Reviewed patient's current orders and MAR    YES  PMH/SH reviewed - no change compared to H&P  ________________________________________________________________________  Care Plan discussed with:    Comments   Patient Y    Family  Y    RN Y    Care Manager Y    Consultant  Y DR. Tamela Bond Multidiciplinary team rounds were held today with , nursing, pharmacist and clinical coordinator. Patient's plan of care was discussed; medications were reviewed and discharge planning was addressed.      ________________________________________________________________________      Comments   >50% of visit spent in counseling and coordination of care Y    ________________________________________________________________________  Blanchie Davin Uziel Baez NP     Procedures: see electronic medical records for all procedures/Xrays and details which were not copied into this note but were reviewed prior to creation of Plan. LABS:  I reviewed today's most current labs and imaging studies.   Pertinent labs include:  Recent Labs     02/07/22 0520 02/06/22 0737 02/05/22  1256   WBC 8.5 7.6 7.8   HGB 11.0* 11.9 11.7   HCT 32.6* 34.7* 34.0*   PLT 85* 81* 87*     Recent Labs     02/07/22 0520 02/06/22 0737 02/05/22  1256   * 130* 133*   K 4.5 4.1 3.9    99 101   CO2 22 26 25    86 94   BUN 21* 22* 19   CREA 0.89 0.86 0.79   CA 8.2* 8.7 8.3*   MG 2.0  --   --    PHOS 2.7  --   --    ALB  --   --  3.2*   TBILI  --   --  4.9*   ALT  --   --  61   INR 1.4* 1.4* 1.4*       Signed: Daija Marte NP

## 2022-02-08 NOTE — HOSPICE
[facility]  [phone]    Patient Name: Kaylan Ashby  YOB: 1950  Age: 73 y.o. [facility] RN Note:  Hospice consult noted. Chart reviewed. Plan of care discussed with patients nurse & care manager. In to meet with patient and family. Discussed Hospice philosophy, general plan of care, levels of care, services and on call procedures. Left them with a brochure. Patient currently lives at [address] [place] [address],[address]. Reviewed how hospice works in a facility. They inquired about draining the ascites. Patient signed her DDNR while I was present, copy placed in chart. They are going to think about it and I told them to reach out to  with their answer. Main concern is getting her moved to another room. Thank you for the opportunity to be of service to this patient.     [id number] Liaison  [phone] c  [phone] o

## 2022-02-08 NOTE — PROGRESS NOTES
Transition of Care Plan:    RUR: 21%  Disposition: Return LTC-[facility], Palliative Consult, Nephrologist Consult, Hospice info session   Follow up appointments: Follow up with PCP and/or Specialist   DME needed: onsite at facility   Transportation at Discharge: BLS transport   [address] or means to access home:  N/A  IM Medicare Letter: 2nd IM Medicare Letter to be given   Is patient a BCPI-A Bundle: CM to provide if required     If yes, was Bundle Letter given?:    Is patient a  and connected with the [place]? N/A              If yes, was [facility] transfer form completed and [facility] notified? Caregiver Contact: Manuel Cm (sister) [phone]  Discharge Caregiver contacted prior to discharge? Family to be contacted   Care Conference needed?:               Not a this time    UPDATE: 3:36PM    CM acknowledged consult. CM sent referral to [facility] to assist with hospice info session. CM will enter referrals as deemed necessary. CM will continue to follow. JOHNATHAN Aguila, [place]        INITIAL NOTE: CM: Kenia Banks is currently working with pt in the [facility]. Pt is known to be LTC pt at [facility], and facility is willing to accept pt back once medically stable. CM informed by clinical staff that Palliative will consult with pt to review goals of care and to address code status. CM informed that Nephrologist will be consulted. Once medically stable pt will transition back to snf, via BLS (CM will enter transport time). PT WILL REQUIRE RAPID COVID TEST PRIOR TO D/C. CM will inform clinical team.    CM will continue to follow.     JOHNATHAN Aguila, [address] [place] [address]

## 2022-02-08 NOTE — PROGRESS NOTES
Physician Progress Note      PATIENT:               Aamir Rhodes  CSN #:                  [id number]  :                       1950  ADMIT DATE:       2022 9:20 AM  [address] [employer] DATE:  RESPONDING  PROVIDER #:        Bernadette Ambrocio NP          QUERY TEXT:    Dr Rodney Redmond:    Pt admitted with Cirrhosis secondary to CARBAJAL with ascites. Noted documentation of  'Severe malnutrition ' by RD consult on . Please further specify type of malnutrition with documentation in the medical record. The medical record reflects the following:  Risk Factors: 71yoM w/ COPD, CARBAJAL cirrhosis, FTT, early satiety, recurrent Ascites  Clinical Indicators:  RD Malnutrition Assessment:  Malnutrition Status:  Severe malnutrition  Context:  Chronic illness  Findings of the 6 clinical characteristics of malnutrition:  Energy Intake:  7 - 75% or less est energy requirements for 1 month or longer  Weight Loss:  Unable to assess  Body Fat Loss:  7 - Severe body fat loss, Triceps,Orbital,Buccal region  Muscle Mass Loss:  7 - Severe muscle mass loss, Hand (interosseous),Temples (temporalis),Clavicles (pectoralis &deltoids),Scapula (trapezius)  Fluid Accumulation:  7 - Severe, Ascites  Treatment: Continue oral nutrition supplement and current diet, RD evaluation. ASPEN Criteria:  https://aspenjournals. onlinelibrary. sullivan. com/doi/full/10.1177/1889993974214586  Options provided:  -- Mild Malnutrition  -- Moderate Malnutrition  -- Severe Malnutrition  -- Mild Protein calorie malnutrition  -- Moderate Protein calorie malnutrition  -- Severe Protein calorie malnutrition  -- Other - I will add my own diagnosis  -- Disagree - Not applicable / Not valid  -- Disagree - Clinically unable to determine / Unknown  -- Refer to Clinical Documentation Reviewer    PROVIDER RESPONSE TEXT:    This patient has severe malnutrition.     Query created by: Samir Dumont on 2022 4:28 PM      Electronically signed by:  Bernadette Ambrocio NP 2/8/2022 4:40 PM

## 2022-02-08 NOTE — PROGRESS NOTES
Chart reviewed for paracentesis, transport sent, attempted to get report from nurse on Oncology, on hold for >15 min then disconnected.

## 2022-02-08 NOTE — PROGRESS NOTES
Comprehensive Nutrition Assessment    Type and Reason for Visit: Initial,Consult    Nutrition Recommendations/Plan:   Resume regular, low sodium diet  Add Ensure compact BID Magic cup BID  Please document % meals and supplements consumed in flowsheet I/O's under intake     Nutrition Assessment:      Consult received for poor PO intake. Chart reviewed. Pt noted for CARBAJAL cirrhosis, B12 deficiency, weakness, falls, hypothyroidism, depression, COPD, CAD, MI, HLD, HTN, FTT. Pt NPO today for paracentesis. Chart reports pt c/o tightness in her belly affecting her ability to eat and drink much. Pt sleeping at time of visit after paracentesis, but sister at bedside confirms pt gets full easily. 3100mL pulled today. Sister also reports significant dry weight loss, with notable fat and muscle wasting present on physical exam. Sister had tried bringing pt Ensure supplements to [facility], but she through them away without trying them. Pt's confusion is sometimes a barrier to her PO intake, and she does not like the food at the facility either. No concerns for chewing/swallowing issues. Encourage intake of meals and supplements. Will continue monitoring.      Wt Readings from Last 5 Encounters:   02/04/22 67.6 kg (149 lb)   01/31/22 72.6 kg (160 lb)   12/06/21 70 kg (154 lb 4.8 oz)   11/23/21 77.1 kg (170 lb)   10/04/21 72.6 kg (160 lb)   ]    Malnutrition Assessment:  Malnutrition Status:  Severe malnutrition    Context:  Chronic illness     Findings of the 6 clinical characteristics of malnutrition:   Energy Intake:  7 - 75% or less est energy requirements for 1 month or longer  Weight Loss:  Unable to assess     Body Fat Loss:  7 - Severe body fat loss, Triceps,Orbital,Buccal region   Muscle Mass Loss:  7 - Severe muscle mass loss, Hand (interosseous),Temples (temporalis),Clavicles (pectoralis &deltoids),Scapula (trapezius)  Fluid Accumulation:  7 - Severe, Ascites   Strength:  Not performed         Estimated Daily Nutrient Needs:  Energy (kcal): 1542 kcals (BMR 1186 x 1.3AF); Weight Used for Energy Requirements: Current  Protein (g): 68-81g (1.0-1.2g/kg); Weight Used for Protein Requirements: Current  Fluid (ml/day): 1500mL; Method Used for Fluid Requirements: 1 ml/kcal      Nutrition Related Findings:  Labs: Na 128, elevated LFTs, Cr 1.05, BUN 24. Meds: B12, lasix, synthroid, klorcon, aldactone. BM 2/6. Wounds:    None       Current Nutrition Therapies:  ADULT ORAL NUTRITION SUPPLEMENT Breakfast, Dinner; Standard 4 oz  ADULT ORAL NUTRITION SUPPLEMENT Lunch, Dinner; Frozen Supplement  ADULT DIET Regular; Low Sodium (2 gm); Chocolate Ensure    Anthropometric Measures:  · Height:  5' 4\" (162.6 cm)  · Current Body Wt:  67.6 kg (149 lb)   · Ideal Body Wt:  120 lbs:  124.2 %   · BMI Category: Overweight (BMI 25.0-29. 9)       Nutrition Diagnosis:   · Inadequate protein-energy intake related to early satiety (decreased appetite) as evidenced by poor intake prior to admission,weight loss,moderate loss of subcutaneous fat,moderate muscle loss      Nutrition Interventions:   Food and/or Nutrient Delivery: Start oral diet,Start oral nutrition supplement  Nutrition Education and Counseling: No recommendations at this time  Coordination of Nutrition Care: Continue to monitor while inpatient    Goals:  PO intake >50% meals and supplements next 2-4 days       Nutrition Monitoring and Evaluation:   Behavioral-Environmental Outcomes: None identified  Food/Nutrient Intake Outcomes: Food and nutrient intake,Supplement intake  Physical Signs/Symptoms Outcomes: Biochemical data,GI status,Weight,Fluid status or edema,Nutrition focused physical findings    Discharge Planning:    Continue oral nutrition supplement,Continue current diet     Electronically signed by Parisa Dejesus RD on 2/8/2022 at 2:59 PM    Contact: [phone]

## 2022-02-08 NOTE — PROGRESS NOTES
Pt tolerated paracentesis well, 3100 cc of yellow fluid removed. Pt without pain or complaints. No labs ordered, hold cup sent to lab.

## 2022-02-08 NOTE — CONSULTS
Palliative Medicine Consult  Aguirre: [phone])    Patient Name: Judie Magallon  YOB: 1950    Date of Initial Consult: 2/7/22  Reason for Consult: Care, decisons  Requesting Provider: Arther Siemens, NP  Primary Care Physician: Soledad Harmon MD     SUMMARY:   Judie Magallno is a 73 y.o. female with a past history of Linda Marroquin (liver cirrhosis biopsy 8/2021), recent onset of ascites 1/2022, follows up with Dr Lorne Sarabia , B12 defieciency on B12 replacement, COPD, CAD,  reccurent falls and generalized weakness admIted 12/1/-12/6 d/c to rehab,  who was admitted on 2/4/2022 from [address] [place] [address]  with a diagnosis of abdominal distension with recurrent ascites, requiring US guided paracentesis on 2/4/22 with removal of 5600 ml of fluid. Other PMH : depression and hypothyroidism. Current medical issues leading to Palliative Medicine involvement include:decompensated cirrhosis with recurrent ascites, debility, address goals of care needs advance medical directives. Social hx : patient is single, no children, was living in apartment by herself until few months ago when she started getting weak and had few fall . Her sister Oneida Nguyen is main support . PALLIATIVE DIAGNOSES:   1. Debility (Per PT notes, patient is 65% dependant on others forADLS). 2. Weakness  3. Poor appetite  4. Abdominal distension   5. Palliative care encounter   6. DNR discussion        PLAN:   1. Met with patient with her sister Hayden Barahona to follow up on our conversation from yesterday regarding goals of care . 2. Patient is , alert, oriented to person and place, mentation is slow, able to make medical decisions . 3. Advance medical directives : patient completed advance directives /MPOA with  today , she did not completed the living will portion (refer to ACP note from [facility]).   4. Code status : we followed up on our conversation from yesterday, patient has  decided to forgo CPR, knowing , she will not be brought back to herself, her sister Sam Prom support her decision, DDNR explained patient signed DDNR. 5. Patient would like to meet with hospice for information . 6. Goals are clear, I will sign off , please do not hesitate to call us for any future assistance . 7. Initial consult note routed to primary continuity provider and/or primary health care team members  8. Communicated plan of care with: Palliative IDTCee 192 Team     GOALS OF CARE / TREATMENT PREFERENCES:     GOALS OF CARE:  Patient/Health Care Proxy Stated Goals:  (may consider comfort focus care)    TREATMENT PREFERENCES:   Code Status: DNR    Patient and family's personal goals include: may consider hospice    Important upcoming milestones or family events: none     The patient identifies the following as important for living well: comfort and symptom controll      Advance Care Planning:  [x] The [facility] Interdisciplinary Team has updated the ACP Navigator with Health Care Decision Maker and Patient Capacity      Primary Decision Maker: Viktor Dias - Sister - [phone]    Secondary Decision Maker: Creseema Daughters - Other Relative - [phone]  Advance Care Planning 2/7/2022   Patient's Healthcare Decision Maker is: Named in scanned ACP document   Confirm Advance Directive Yes, on file   Patient Would Like to Complete Advance Directive -       Medical Interventions:  (DNAR)       Other:    As far as possible, the palliative care team has discussed with patient / health care proxy about goals of care / treatment preferences for patient. HISTORY:     History obtained from: chart, bed side Rn, patient and her sister     CHIEF COMPLAINT:\" feeling better\"    HPI/SUBJECTIVE:    The patient is:   [] Verbal and participatory; patient is feeling weak , appetite is very poor, sister notes she gets \" full easily, fluid in belly is building again, she denies any abdominal pain or discomfort.     Bowels are moving. Patient notes incoordination of gait     2/8/22: feel better, appetite is \" medium \"    Clinical Pain Assessment (nonverbal scale for severity on nonverbal patients):   Clinical Pain Assessment  Severity: 0          Duration: for how long has pt been experiencing pain (e.g., 2 days, 1 month, years)  Frequency: how often pain is an issue (e.g., several times per day, once every few days, constant)     FUNCTIONAL ASSESSMENT:     Palliative Performance Scale (PPS):  PPS: 40       PSYCHOSOCIAL/SPIRITUAL SCREENING:     Palliative IDT has assessed this patient for cultural preferences / practices and a referral made as appropriate to needs (Cultural Services, Patient Advocacy, Ethics, etc.)    Any spiritual / Hoahaoism concerns:  [] Yes /  [x] No   If \"Yes\" to discuss with pastoral care during IDT     Does caregiver feel burdened by caring for their loved one:   [] Yes /  [x] No /  [] No Caregiver Present    If \"Yes\" to discuss with social work during IDT    Anticipatory grief assessment:   [x] Normal  / [] Maladaptive     If \"Maladaptive\" to discuss with social work during IDT    ESAS Anxiety: Anxiety: 0    ESAS Depression: Depression: 0        REVIEW OF SYSTEMS:     Positive and pertinent negative findings in ROS are noted above in HPI. The following systems were [x] reviewed / [] unable to be reviewed as noted in HPI  Other findings are noted below. Systems: constitutional, ears/nose/mouth/throat, respiratory, gastrointestinal, genitourinary, musculoskeletal, integumentary, neurologic, psychiatric, endocrine. Positive findings noted below.   Modified ESAS Completed by: provider   Fatigue: 9 Drowsiness: 0   Depression: 0 Pain: 0   Anxiety: 0 Nausea: 0   Anorexia: 8 Dyspnea: 0     Constipation: No     Stool Occurrence(s): 1        PHYSICAL EXAM:     From RN flowsheet:  Wt Readings from Last 3 Encounters:   02/04/22 149 lb (67.6 kg)   01/31/22 160 lb (72.6 kg)   12/06/21 154 lb 4.8 oz (70 kg)     Blood pressure 114/73, pulse 75, temperature 97.4 °F (36.3 °C), resp. rate 18, height 5' 4\" (1.626 m), weight 149 lb (67.6 kg), SpO2 (!) 20 %. Pain Scale 1: Numeric (0 - 10)  Pain Intensity 1: 0                 Last bowel movement, if known:     Constitutional: frail, appears older to stated age, cachectic, some confusion, oriented x 2  Eyes: pupils equal, anicteric  ENMT: no nasal discharge, moist mucous membranes  Cardiovascular: regular rhythm, no edema   Respiratory: breathing not labored, symmetric  Gastrointestinal: soft distended, non-tender, +bowel sounds  Musculoskeletal:  Generalized , muscular wasting, no deformity, no tenderness to palpation  Skin: warm, dry  Neurologic: following commands, moving all extremities, mentation is slow  Psychiatric: flat affect, no hallucinations  Other:       HISTORY:     Active Problems:    Cirrhosis of liver with ascites (HCC) (2022)      Hyponatremia (2022)      Ascites (2022)      Past Medical History:   Diagnosis Date    Ascites 2022    7100ml removed by paracentesis    B12 deficiency     [facility]     COPD [facility]chronic obstructive pulmonary disease) [facility]     HTN (hypertension)     Hyperlipidemia     Myocardial infarction ([facility]     Dr. Joe Starr CARBAJAL (nonalcoholic steatohepatitis) 10/2021    Dr. Jose Zepeda Recurrent falls       Past Surgical History:   Procedure Laterality Date    HX BILATERAL MASTECTOMY      HX CORONARY STENT PLACEMENT        Family History   Problem Relation Age of Onset    Emphysema Mother     Heart Attack Father     Breast Cancer Sister       History reviewed, no pertinent family history.   Social History     Tobacco Use    Smoking status: Former Smoker     Packs/day: 1.00     Years: 42.00     Pack years: 42.00     Types: Cigarettes     Quit date: 2015     Years since quittin.7    Smokeless tobacco: Never Used   Substance Use Topics    Alcohol use: Never     No Known Allergies   Current Facility-Administered Medications   Medication Dose Route Frequency    albumin human 25% (BUMINATE) solution 12.5 g  12.5 g IntraVENous Q6H    lidocaine (PF) (XYLOCAINE) 10 mg/mL (1 %) injection 10 mL  10 mL SubCUTAneous RAD ONCE    spironolactone (ALDACTONE) tablet 50 mg  50 mg Oral DAILY    furosemide (LASIX) tablet 40 mg  40 mg Oral BID    aspirin delayed-release tablet 81 mg  81 mg Oral DAILY    bisacodyL (DULCOLAX) suppository 10 mg  10 mg Rectal DAILY PRN    lactulose (CHRONULAC) 10 gram/15 mL solution 30 mL  20 g Oral DAILY PRN    levothyroxine (SYNTHROID) tablet 112 mcg  112 mcg Oral ACB    magnesium hydroxide (MILK OF MAGNESIA) 400 mg/5 mL oral suspension 30 mL  30 mL Oral DAILY PRN    ondansetron (ZOFRAN ODT) tablet 4 mg  4 mg Oral Q6H PRN    polyethylene glycol (MIRALAX) packet 17 g  17 g Oral DAILY PRN    potassium chloride (K-DUR, KLOR-CON M20) SR tablet 20 mEq  20 mEq Oral DAILY    sertraline (ZOLOFT) tablet 50 mg  50 mg Oral DAILY    budesonide (PULMICORT) 250 mcg/2ml nebulizer susp  250 mcg Nebulization BID RT    rifAXIMin (XIFAXAN) tablet 550 mg  550 mg Oral BID    cyanocobalamin (VITAMIN B12) injection 1,000 mcg  1,000 mcg IntraMUSCular Q30D          LAB AND IMAGING FINDINGS:     Lab Results   Component Value Date/Time    WBC 10.5 02/08/2022 04:39 AM    HGB 12.2 02/08/2022 04:39 AM    PLATELET 91 (L) 84/92/5076 04:39 AM     Lab Results   Component Value Date/Time    Sodium 128 (L) 02/08/2022 04:39 AM    Potassium 4.6 02/08/2022 04:39 AM    Chloride 101 02/08/2022 04:39 AM    CO2 24 02/08/2022 04:39 AM    BUN 24 (H) 02/08/2022 04:39 AM    Creatinine 1.05 (H) 02/08/2022 04:39 AM    Calcium 8.6 02/08/2022 04:39 AM    Magnesium 2.2 02/08/2022 04:39 AM    Phosphorus 2.8 02/08/2022 04:39 AM      Lab Results   Component Value Date/Time    Alk.  phosphatase 125 (H) 02/05/2022 12:56 PM    Protein, total 7.3 02/05/2022 12:56 PM    Albumin 3.2 (L) 02/05/2022 12:56 PM    Globulin 4.1 (H) 02/05/2022 12:56 PM     Lab Results   Component Value Date/Time    INR 1.4 (H) 02/08/2022 04:39 AM    Prothrombin time 14.7 (H) 02/08/2022 04:39 AM      Lab Results   Component Value Date/Time    Iron 56 10/04/2021 02:21 PM    TIBC 508 (H) 10/04/2021 02:21 PM    Iron % saturation 11 (L) 10/04/2021 02:21 PM    Ferritin 23 (L) 10/04/2021 02:21 PM      No results found for: PH, PCO2, PO2  No components found for: [place]   Lab Results   Component Value Date/Time     12/05/2021 02:36 AM    CK - MB 2.0 12/05/2021 02:36 AM                Total time: 35 mins  Counseling / coordination time, spent as noted above: 30 mins  > 50% counseling / coordination?: YES     Prolonged service was provided for  []30 min   []75 min in face to face time in the presence of the patient, spent as noted above. Time Start:   Time End:   Note: this can only be billed with 21504 (initial) or 45520 (follow up). If multiple start / stop times, list each separately.

## 2022-02-08 NOTE — PROGRESS NOTES
End of Shift Note    Bedside shift change report given to Neda (oncoming nurse) by Rolando Richards RN (offgoing nurse). Report included the following information SBAR    Shift worked:  [phone]     Shift summary and any significant changes:    Paracentesis - 3,100ml out. Pt made DNR. Hospice consult. Diet made regular low Na. Haile Grace set up new waiting for urine samples. Concerns for physician to address:       Zone phone for oncoming shift:   [phone]       Activity:  Activity Level: Up with Assistance  Number times ambulated in hallways past shift: 0  Number of times OOB to chair past shift: 0    Cardiac:   Cardiac Monitoring: No      Cardiac Rhythm: Sinus Rhythm    Access:   Current line(s): PIV     Genitourinary:   Urinary status: external catheter    Respiratory:   O2 Device: None (Room air)  Chronic home O2 use?: NO  Incentive spirometer at bedside: YES     GI:  Last Bowel Movement Date: 02/06/22  Current diet:  DIET NPO  Passing flatus: YES  Tolerating current diet: YES       Pain Management:   Patient states pain is manageable on current regimen: YES    Skin:  Mark Score: 19  Interventions: speciality bed, float heels, increase time out of bed, foam dressing, PT/OT consult, limit briefs and internal/external urinary devices    Patient Safety:  Fall Score:  Total Score: 4  Interventions: bed/chair alarm, assistive device (walker, cane, etc), gripper socks, pt to call before getting OOB and stay with me (per policy)  High Fall Risk: Yes    Length of Stay:  Expected LOS: 3d 4h  Actual LOS: 4      Rolando Richards RN

## 2022-02-08 NOTE — CONSULTS
Nephrology Consult Note     James Hobson     [facility]. [facility]              Phone - [phone]   Patient: Rocio Faust   YOB: 1950    Date- 2/8/2022  MRN: [id number]             REASON FOR CONSULTATION: Hyponatremia  CONSULTING PHYSICIAN: Dr. Shakir Dunlap MD     IMPRESSION & PLAN:    Hyponatremia(suspect secondary to hypervolemia poor p.o. intake, diuretic use)   CARBAJAL cirrhosis   Recurrent ascites   Thrombocytopenia   Failure to thrive    PLAN-  · Suspect etiology of hyponatremia is multifactorial.  Most likely hypervolemia from fluid retention in setting of decompensated liver cirrhosis. Ongoing use of spironolactone. · Agree with continuing IV albumin for now. · If hyponatremia worsens tomorrow, then consider reducing dose of spironolactone. · I have ordered urine osmolarity, serum osmolality, serum uric acid and urine sodium. · Encourage adequate p.o. intake. · Daily labs  · Goal is to keep the sodium greater than 125 mEq. · Blood pressure so far stable, will continue with albumin, no need for midodrine at this time. · Hyponatremia again is an independent risk factor for mortality in patients with liver cirrhosis. ·  Agree with palliative care evaluation and assessing goals of care. · No acute need to use 3% saline unless patient is symptomatic and sodium is less than 120. · Case was discussed internal medicine team.  · Thank you for the consult follow with you     · Active Problems:  ·   Cirrhosis of liver with ascites ([address] (2/4/2022)  ·   ·   Hyponatremia (2/4/2022)  ·   ·   Ascites (2/4/2022)  ·   ·     [x] High complexity decision making was performed  [x] Patient is at high-risk of decompensation with multiple organ involvement    Subjective:   HPI: Rocio Faust is a 73 y.o.   female past medical history significant for CARBAJAL cirrhosis follows up with Dr. Angi Sandoval, COPD, CAD, recurrent falls and failure to thrive. She was recently admitted to the hospital for failure to thrive and was sent to rehab. She came back again from the rehab on the 2/4 with complaints of worsening abdominal distention. She got 5.6 L of volume taken off in the form of acscitic fluid on 4 February. She  demonstrating more accumulation of ascites. Patient was admitted for further evaluation in the hospital.  Patient has been feeling very weak and and has been demonstrating poor p.o. intake. Her sodium  initially on admission was 133 which has worsened to 128 now. Renal consult requested for evaluation of hyponatremia. Review of Systems:       A 11 point review of system was performed today. Pertinent positives and negatives are mentioned in the HPI. The reminder of the ROS is negative and noncontributory. Past Medical History:   Diagnosis Date    Ascites 01/31/2022    7100ml removed by paracentesis    B12 deficiency     [facility]     COPD (chronic obstructive pulmonary disease) [facility]     HTN (hypertension)     Hyperlipidemia     Myocardial infarction [facility]) 2017    Dr. Willard Vincent CARBAJAL (nonalcoholic steatohepatitis) 10/2021    Dr. Jaguar Rg Recurrent falls       Past Surgical History:   Procedure Laterality Date    HX BILATERAL MASTECTOMY      HX CORONARY STENT PLACEMENT        Prior to Admission medications    Medication Sig Start Date End Date Taking? Authorizing Provider   furosemide (Lasix) 20 mg tablet Take 20 mg by mouth daily. Yes Provider, Historical   sodium phosphates (FLEET'S) 9.5-3.5 gram/59 mL enema Insert 1 Enema into rectum as needed. Yes Provider, Historical   bisacodyL (DULCOLAX) 10 mg supp Insert 10 mg into rectum daily as needed for Constipation (if no BMO from MOM/lax). Yes Provider, Historical   lactulose (CHRONULAC) 10 gram/15 mL solution Take 20 g by mouth daily as needed (constipation).    Yes Provider, Historical   levothyroxine (SYNTHROID) 112 mcg tablet Take 112 mcg by mouth Daily (before breakfast). Yes Provider, Historical   magnesium hydroxide (Reynolds Milk of Magnesia) 400 mg/5 mL suspension Take 30 mL by mouth daily as needed for Constipation. Yes Provider, Historical   polyethylene glycol (Miralax) 17 gram packet Take 17 g by mouth daily as needed for Constipation. Yes Provider, Historical   potassium chloride SA (KLOR-CON M15) 15 mEq tablet Take 20 mEq by mouth daily. Yes Provider, Historical   ondansetron hcl (ZOFRAN) 4 mg tablet Take 4 mg by mouth every six (6) hours as needed for Nausea or Vomiting. Yes Provider, Historical   sertraline (ZOLOFT) 100 mg tablet Take 0.5 Tablets by mouth daily. 21  Yes Wesley Brannon MD   cyanocobalamin (VITAMIN B12) 1,000 mcg/mL injection 1000 mg IM every wednesday starting 12/8 x 4 shots then monthly  Indications: inadequate vitamin B12  Patient taking differently: 1,000 mcg by IntraMUSCular route every Wednesday. 1000 mg IM every wednesday starting 1/5/2022 x 4 shots then monthly  Indications: inadequate vitamin B12 21  Yes Wesley Brannon MD   aspirin delayed-release 81 mg tablet Take 81 mg by mouth daily. Yes Provider, Historical   budesonide (PULMICORT) 180 mcg/actuation aepb inhaler Take 2 Puffs by inhalation daily. Yes Provider, Historical   albuterol (PROVENTIL VENTOLIN) 2.5 mg /3 mL (0.083 %) nebu Take 2.5 mg by inhalation every four (4) hours as needed for Wheezing. Yes Provider, Historical   spironolactone (ALDACTONE) 25 mg tablet Take 25 mg by mouth daily. 21  Yes Provider, Historical   atorvastatin (LIPITOR) 80 mg tablet Take 80 mg by mouth daily.   Patient not taking: Reported on 2022   Provider, Historical     No Known Allergies   Social History     Tobacco Use    Smoking status: Former Smoker     Packs/day: 1.00     Years: 42.00     Pack years: 42.00     Types: Cigarettes     Quit date: 2015     Years since quittin.7    Smokeless tobacco: Never Used   Substance Use Topics    Alcohol use: Never      Family History   Problem Relation Age of Onset    Emphysema Mother     Heart Attack Father     Breast Cancer Sister         Objective:      Patient Vitals for the past 24 hrs:   Temp Pulse Resp BP SpO2   02/08/22 0931 -- -- -- -- 96 %   02/08/22 0814 97.4 °F (36.3 °C) 76 18 122/69 98 %   02/07/22 2341 97.3 °F (36.3 °C) 91 18 129/70 96 %   02/07/22 2134 -- -- -- -- 98 %   02/07/22 1954 98.1 °F (36.7 °C) 97 17 128/70 98 %   02/07/22 1527 98.6 °F (37 °C) 97 16 128/76 97 %     No intake/output data recorded. Last 3 Recorded Weights in this Encounter    02/04/22 0929   Weight: 67.6 kg (149 lb)      Physical Exam:  General:Alert, No distress,   Eyes: scleral icterus, No conjunctival pallor  Neck:Supple,no mass palpable,no thyromegaly  Lungs:Clears to auscultation Bilaterally, normal respiratory effort  CVS:RRR, S1 S2 normal,  No rub,  Abdomen:Soft, Non tender, No hepatosplenomegaly  Extremities: no LE edema  Skin:No rash or lesions, Warm and DRY   Psych: appropriate affect    : No Ramos  Musculoskeletal : no redness, no joint tenderness  NEURO: Normal Speech, Non focal deficit    CODE STATUS: Full  Care Plan discussed with: Patient  Chart reviewed.     Yes Reviewed previous records   Yes Discussion with patient and/or family and questions answered       ECG[de-identified] Rev:no  Xray/CT/US/MRI REV:yes  Lab Data Personally Reviewed: (see below)  Recent Labs     02/08/22  0439 02/07/22  0520 02/06/22  0737 02/05/22  1256   WBC 10.5 8.5 7.6 7.8   HGB 12.2 11.0* 11.9 11.7   PLT 91* 85* 81* 87*   ANEU 8.0 6.2 5.6  --    INR 1.4* 1.4* 1.4* 1.4*   * 130* 130* 133*   K 4.6 4.5 4.1 3.9   * 100 86 94   BUN 24* 21* 22* 19   CREA 1.05* 0.89 0.86 0.79   ALT  --   --   --  61   TBILI  --   --   --  4.9*   AP  --   --   --  125*   CA 8.6 8.2* 8.7 8.3*   MG 2.2 2.0  --   --    PHOS 2.8 2.7  --   --      Lab Results   Component Value Date/Time    Color YELLOW/STRAW 2021 03:35 AM    Appearance CLEAR 2021 03:35 AM    Specific gravity 1.010 2021 03:35 AM    pH (UA) 5.5 2021 03:35 AM    Protein Negative 2021 03:35 AM    Glucose Negative 2021 03:35 AM    Ketone 15 (A) 2021 03:35 AM    Urobilinogen 2.0 (H) 2021 03:35 AM    Nitrites Negative 2021 03:35 AM    Leukocyte Esterase Negative 2021 03:35 AM    Epithelial cells FEW 2021 03:35 AM    Bacteria Negative 2021 03:35 AM    WBC 0-4 2021 03:35 AM    RBC 0-5 2021 03:35 AM       Lab Results   Component Value Date/Time    Iron 56 10/04/2021 02:21 PM    TIBC 508 (H) 10/04/2021 02:21 PM    Iron % saturation 11 (L) 10/04/2021 02:21 PM    Ferritin 23 (L) 10/04/2021 02:21 PM     Lab Results   Component Value Date/Time    Culture result: Culture performed on Fluid swab specimen 2022 03:36 PM    Culture result: NO GROWTH 4 DAYS 2022 03:36 PM     Prior to Admission Medications   Prescriptions Last Dose Informant Patient Reported? Taking? albuterol (PROVENTIL VENTOLIN) 2.5 mg /3 mL (0.083 %) nebu  Transfer Papers Yes Yes   Sig: Take 2.5 mg by inhalation every four (4) hours as needed for Wheezing. aspirin delayed-release 81 mg tablet  Transfer Papers Yes Yes   Sig: Take 81 mg by mouth daily. atorvastatin (LIPITOR) 80 mg tablet Not Taking at Unknown time Transfer Papers Yes No   Sig: Take 80 mg by mouth daily. Patient not taking: Reported on 2022   bisacodyL (DULCOLAX) 10 mg supp  Transfer Papers Yes Yes   Sig: Insert 10 mg into rectum daily as needed for Constipation (if no BMO from MOM/lax). budesonide (PULMICORT) 180 mcg/actuation aepb inhaler  Transfer Papers Yes Yes   Sig: Take 2 Puffs by inhalation daily.    cyanocobalamin (VITAMIN B12) 1,000 mcg/mL injection  Transfer Papers No Yes   Si mg IM every wednesday starting 12/8 x 4 shots then monthly  Indications: inadequate vitamin B12   Patient taking differently: 1,000 mcg by IntraMUSCular route every Wednesday. 1000 mg IM every wednesday starting 1/5/2022 x 4 shots then monthly  Indications: inadequate vitamin B12   furosemide (Lasix) 20 mg tablet   Yes Yes   Sig: Take 20 mg by mouth daily. lactulose (CHRONULAC) 10 gram/15 mL solution  Transfer Papers Yes Yes   Sig: Take 20 g by mouth daily as needed (constipation). levothyroxine (SYNTHROID) 112 mcg tablet  Transfer Papers Yes Yes   Sig: Take 112 mcg by mouth Daily (before breakfast). magnesium hydroxide (Reynolds Milk of Magnesia) 400 mg/5 mL suspension  Transfer Papers Yes Yes   Sig: Take 30 mL by mouth daily as needed for Constipation. ondansetron hcl (ZOFRAN) 4 mg tablet  Transfer Papers Yes Yes   Sig: Take 4 mg by mouth every six (6) hours as needed for Nausea or Vomiting. polyethylene glycol (Miralax) 17 gram packet  Transfer Papers Yes Yes   Sig: Take 17 g by mouth daily as needed for Constipation. potassium chloride SA (KLOR-CON M15) 15 mEq tablet  Transfer Papers Yes Yes   Sig: Take 20 mEq by mouth daily. sertraline (ZOLOFT) 100 mg tablet  Transfer Papers No Yes   Sig: Take 0.5 Tablets by mouth daily. sodium phosphates (FLEET'S) 9.5-3.5 gram/59 mL enema   Yes Yes   Sig: Insert 1 Enema into rectum as needed. spironolactone (ALDACTONE) 25 mg tablet  Transfer Papers Yes Yes   Sig: Take 25 mg by mouth daily. Facility-Administered Medications: None     Imaging:    Medications list Personally Reviewed   [x]      Yes     []               No    Thank you for allowing us to participate in the care this patient. We will follow patient with you. Signed By: Elisa Ibarra [facility]  [facility] [id number]  [place], [address]  Phone - [phone]         Fax - [phone] [facility] Office  [address] [place] [address]  Phone - [phone]        Fax - [phone]     www. [facility]

## 2022-02-08 NOTE — PROGRESS NOTES
End of Shift Note    Bedside shift change report given to Dl Marquez (oncoming nurse) by Regan Mas (offgoing nurse). Report included the following information SBAR, Kardex and MAR    Shift worked:  7p-7a     Shift summary and any significant changes:     Scheduled medications have been given, see MAR.  IV's have been flushed and are patent. Patient is up with the assistance of two with a walker. Patient has been repositioned during my shift. Patient teaching and routine rounding has been done. Concerns for physician to address:       Zone phone for oncoming shift:          Activity:  Activity Level: (P) Up with Assistance  Number times ambulated in hallways past shift: 0  Number of times OOB to chair past shift: 0    Cardiac:   Cardiac Monitoring: No      Cardiac Rhythm: Sinus Rhythm    Access:   Current line(s): PIV     Genitourinary:   Urinary status: external catheter    Respiratory:   O2 Device: None (Room air)  Chronic home O2 use?: NO  Incentive spirometer at bedside: NO     GI:  Last Bowel Movement Date: 02/06/22  Current diet:  DIET NPO  Passing flatus: YES  Tolerating current diet: YES       Pain Management:   Patient states pain is manageable on current regimen: YES    Skin:  Mark Score: (P) 19  Interventions: float heels and internal/external urinary devices    Patient Safety:  Fall Score:  Total Score: (P) 4  Interventions: bed/chair alarm, assistive device (walker, cane, etc) and pt to call before getting OOB  High Fall Risk: (P) Yes    Length of Stay:  Expected LOS: 3d 4h  Actual LOS: [address]

## 2022-02-08 NOTE — PROGRESS NOTES
I reviewed pertinent labs and imaging, and discussed /agreed on the plan of care with Dr. Vada Favre. Hospitalist Progress Note    NAME: Kobi Paige   :  1950   MRN:  [id number]     Excerpted HPI and summary of hospital course:   Gucci Valdez is a 73 y.o. female with liver biopsy proven liver cirrhosis 2021, seen by Dr. Rosa Yuen 10/21, recent admission  for recurrent falls, generalized weakness from B12 deficiency sent from [address] [place] [address],[address] for recurrent abdominal distention. She is getting rehab since discharge on 21. Patient previously not had decompensated liver cirrhosis. Had thrombocytopenia and mild coagulopathy. On 22, sent to ER for abdominal distention. Had US guided paracentesis with removal of 7100ml clear yellow ascites. At SNF, she is on aldactone 25mg and lasix 20mg daily.      She is sent back to ER today for recurrent ascites with recurrent increased abdominal distention. Denies fever, chills, n/v.  CT abdomen without contrast with cirrhosis, moderate to large volume ascites, wall thickening involving ascending and transverse colon.     She denies any abdominal pain, diarrhea, rectal bleeding.     We were asked to admit for work up and evaluation of the above problems.  \"          Assessment / Plan:  Cirrhosis secondary to [address] [place] [address]  -GI is on board-- appreciate the recommendations   -T bili 6.2 albumin 2, ALT 56, , ALK phos 127, INR 1.4  -Her sister reports that she is Hep C positive   -Ammonia is 27  -Status post ultrasound-guided paracentesis in the ER which yielded approximately 5600 mL of fluid  -Peritoneal fluid culture NGTD  -SAAG 1.6 suggests portal HTN   -Continue Lasix, Xifaxan, spironolactone  -Palliative care consult pending  -Paracentesis 22 removed 3100 mL   -Scheduled albumin for today  -She is feeling better s/p paracentesis today  -Denies F/C/N/V  -DDNR signed; hospice consult      Hypervolemic hyponatremia, likely secondary to intravascular fluid depletion from third spacing, improving  -Na downtrending to 128  -Continue 1200 mL fluid restriction   -Nephrology on board- appreciate the recommendations  -Repeat labs in AM      Thrombocytopenia  -Secondary to cirrhosis  -Platelets stable KQ 60  -Monitor for signs or symptoms of bleeding     Wall thickening involving ascending and transverse colon on CT scan  -Likely on the basis of portal colopathy  -GI is on board   -AOE 27.9, AGFNBTF [id number]     Recurrent falls, generalized weakness from B12 deficiency  -Continue vitamin B12 injections     History of COPD, former smoker  -Continue Pulmicort     Hypothyroidism  -Continue Synthroid     Hypertension  -Continue on Lasix, Aldactone      Hyperlipidemia  -No PTA med listed for this, likely because of her cirrhosis      History of MI status post stents  -Continue aspirin      25.0 - 29.9 Overweight / Body mass index is 25.58 kg/m². Estimated discharge date: February 9  Barriers:    Code status: Full  Prophylaxis: SCD's  Recommended Disposition: SNF/LTC     Subjective:     Chief Complaint / Reason for Physician Visit  Hospital follow up regarding: Recurrent abdominal distention. She is seen reclining in bed. Dr. Yoav Curran 26 at bedside. DDNR signed. Denies F/C/N/V. Amenable to hospice; hospice consult ordered and Freddie Houston at bedside to facilitate. Review of Systems:  Symptom Y/N Comments  Symptom Y/N Comments   Fever/Chills N   Chest Pain N    Poor Appetite N   Edema N    Cough N   Abdominal Pain N    Sputum N   Joint Pain N    SOB/CHAN N   Pruritis/Rash N    Nausea/vomit N   Tolerating PT/OT Y    Diarrhea N   Tolerating Diet Y    Constipation N   Other       Could NOT obtain due to: N/A     Objective:     VITALS:   Last 24hrs VS reviewed since prior progress note.  Most recent are:  Patient Vitals for the past 24 hrs:   Temp Pulse Resp BP SpO2   02/07/22 2341 97.3 °F (36.3 °C) 91 18 129/70 96 %   02/07/22 2134 -- -- -- -- 98 %   02/07/22 1954 98.1 °F (36.7 °C) 97 17 128/70 98 %   02/07/22 1527 98.6 °F (37 °C) 97 16 128/76 97 %   02/07/22 0813 -- -- -- -- 95 %     No intake or output data in the 24 hours ending 02/08/22 0750     I had a face to face encounter and independently examined this patient on 2/8/2022, as outlined below:  PHYSICAL EXAM:  General: Alert, cooperative, no acute distress    EENT:  Icteric sclerae. MMM  Resp:  Diminished bilaterally, no wheezing or rales. No accessory muscle use  CV:  Regular  rhythm,  No murmurs, rubs, gallops  GI:  Soft, less distended, Non tender. +Bowel sounds  Neurologic:  Non-focal, cranial nerves II-XII grossly intact    Psych:   Good insight. Not anxious nor agitated  Skin:  No rashes. No jaundice  MSK:  Moves all extremities  PVS:   Palpable pulses, no peripheral edema      Reviewed most current lab test results and cultures  YES  Reviewed most current radiology test results   YES  Review and summation of old records today    NO  Reviewed patient's current orders and MAR    YES  PMH/ reviewed - no change compared to H&P  ________________________________________________________________________  Care Plan discussed with:    Comments   Patient Y    Family  Y    RN Y    Care Manager Y    Consultant  Y                     Y Multidiciplinary team rounds were held today with , nursing, pharmacist and clinical coordinator. Patient's plan of care was discussed; medications were reviewed and discharge planning was addressed. ________________________________________________________________________      Comments   >50% of visit spent in counseling and coordination of care     ________________________________________________________________________  Kimberley Mendes NP     Procedures: see electronic medical records for all procedures/Xrays and details which were not copied into this note but were reviewed prior to creation of Plan.       LABS:  I reviewed today's most current labs and imaging studies. Pertinent labs include:  Recent Labs     02/08/22  0439 02/07/22  0520 02/06/22  0737   WBC 10.5 8.5 7.6   HGB 12.2 11.0* 11.9   HCT 35.4 32.6* 34.7*   PLT 91* 85* 81*     Recent Labs     02/08/22  0439 02/07/22  0520 02/06/22  0737 02/05/22  1256 02/05/22  1256   * 130* 130*   < > 133*   K 4.6 4.5 4.1   < > 3.9    102 99   < > 101   CO2 24 22 26   < > 25   * 100 86   < > 94   BUN 24* 21* 22*   < > 19   CREA 1.05* 0.89 0.86   < > 0.79   CA 8.6 8.2* 8.7   < > 8.3*   MG  --  2.0  --   --   --    PHOS 2.8 2.7  --   --   --    ALB  --   --   --   --  3.2*   TBILI  --   --   --   --  4.9*   ALT  --   --   --   --  61   INR 1.4* 1.4* 1.4*   < > 1.4*    < > = values in this interval not displayed.        Signed: Telma Cordero NP

## 2022-02-08 NOTE — HOSPICE
[facility] RN note:  Consult noted. Reviewing chart. Discussed pt with QING Rahman and staff RN Jaime Khan. Will address shortly. Thank you for the opportunity to care for this pt and family. Please contact hospice at [phone] with any questions or concerns.

## 2022-02-09 NOTE — PROGRESS NOTES
Problem: Pressure Injury - Risk of  Goal: *Prevention of pressure injury  Description: Document Mark Scale and appropriate interventions in the flowsheet.   Outcome: Progressing Towards Goal  Note: Pressure Injury Interventions:  Sensory Interventions: Keep linens dry and wrinkle-free,Minimize linen layers    Moisture Interventions: Internal/External urinary devices    Activity Interventions: Increase time out of bed    Mobility Interventions: HOB 30 degrees or less,PT/OT evaluation    Nutrition Interventions: Document food/fluid/supplement intake    Friction and Shear Interventions: HOB 30 degrees or less,Apply protective barrier, creams and emollients

## 2022-02-09 NOTE — PROGRESS NOTES
End of Shift Note    Bedside shift change report given to Tutu Rodarte (oncoming nurse) by Cecilio Elena (offgoing nurse). Report included the following information SBAR, Kardex and MAR    Shift worked:  7p-7a     Shift summary and any significant changes:     Scheduled medications have been given, see MAR.  IV's have been flushed and are patent. Patient did not complain of any pain during my shift. Patient teaching and routine rounding has been done. Concerns for physician to address:       Zone phone for oncoming shift:          Activity:  Activity Level: Bed Rest  Number times ambulated in hallways past shift: 0  Number of times OOB to chair past shift: 0    Cardiac:   Cardiac Monitoring: No      Cardiac Rhythm: Sinus Rhythm    Access:   Current line(s): PIV     Genitourinary:   Urinary status: external catheter    Respiratory:   O2 Device: None (Room air)  Chronic home O2 use?: NO  Incentive spirometer at bedside: NO     GI:  Last Bowel Movement Date: 02/06/22  Current diet:  ADULT ORAL NUTRITION SUPPLEMENT Breakfast, Dinner; Standard 4 oz  ADULT ORAL NUTRITION SUPPLEMENT Lunch, Dinner; Frozen Supplement  ADULT DIET Regular; Low Sodium (2 gm); Chocolate Ensure  Passing flatus: YES  Tolerating current diet: YES       Pain Management:   Patient states pain is manageable on current regimen: YES    Skin:  Mark Score: 16  Interventions: internal/external urinary devices    Patient Safety:  Fall Score:  Total Score: 2  Interventions: bed/chair alarm  High Fall Risk: Yes    Length of Stay:  Expected LOS: 3d 4h  Actual LOS: [facility]

## 2022-02-09 NOTE — PROGRESS NOTES
Transition of Care Plan:    RUR: 21%  Disposition: Return L[facility], Palliative Consult, Nephrologist Consult, Hospice info session   Follow up appointments: Follow up with PCP and/or Specialist   DME needed: onsite at facility   Transportation at Discharge: BLS transport   [address] or means to access home:  N/A  IM Medicare Letter: 2nd IM Medicare Letter to be given   Is patient a BCPI-A Bundle: CM to provide if required     If yes, was Bundle Letter given?:    Is patient a  and connected with the [place]? N/A              If yes, was [place] transfer form completed and VA notified? Caregiver Contact: Sylvania Seip (sister) [phone]  Discharge Caregiver contacted prior to discharge? Family to be contacted   Care Conference needed?:               Not a this time      CM made aware that pt's family met with [facility] liaison, regarding pt's goal of care. CM informed that pt's family is currently undecided regarding hospice needs. CM will staff case with clinical team regarding the following. CM suggested to NP regarding pt's family being undecided regarding pt needing hospice. CM recommended that pt can return back to facility: [address] [facility] (being that she is LTC), and once family is decided on hospice, then family can speak with snf SW to arrange hospice when ready. CM informed that pt will remain at [facility] due to medically stability. CM will arrange transport on 2/10/22, via [facility].     JOHNATHAN Campos, [facility]

## 2022-02-09 NOTE — PROGRESS NOTES
Hospitalist Progress Note    NAME: Rocio Faust   :  1950   MRN:  [id number]     HPI excerpted from admision H&P:  Harrison Davis is a 73 y.o. female with liver biopsy proven liver cirrhosis 2021, seen by Dr. Ney Forde 10/21, recent admission  for recurrent falls, generalized weakness from B12 deficiency sent from [facility] for recurrent abdominal distention. She is getting rehab since discharge on 21. Patient previously not had decompensated liver cirrhosis. Had thrombocytopenia and mild coagulopathy. On 22, sent to ER for abdominal distention. Had US guided paracentesis with removal of 7100ml clear yellow ascites. At [facility], she is on aldactone 25mg and lasix 20mg daily.      She is sent back to ER today for recurrent ascites with recurrent increased abdominal distention. Denies fever, chills, n/v.  CT abdomen without contrast with cirrhosis, moderate to large volume ascites, wall thickening involving ascending and transverse colon. \"    Assessment / Plan:  CARBAJAL cirrhosis  Recurrent ascities    CT abdomen/pelvis 22:  1. Cirrhosis. Moderate to large volume ascites. 2.  Wall thickening involving the ascending and transverse colon, maybe on the basis of portal colopathy, but correlate for infectious or inflammatory colitis. 3.  Healing lateral right 8th-10th rib fractures. 4.  Cholelithiasis. Ascites fluid culture 22:  NG at 4 days. - GI input appreciated. - Palliative care input appreciated. - MELD = 18.  - INR 1.5 today. - S/P US-guided paracentesis in ED for 5600 cc with repeat paracentesis on 22 for 3100 cc, discussed peritoneal drain with patient and she is not interested in considering at this time. - Patient completed 4 doses of albumin 12.5 gm IV q6h today, re-order for additional 24h. - Continue furosemide 40 mg po bid. - Continue spironolactone 50 mg po daily.     - Continue KCl 20 mEq po daily, will need to monitor serum K+ since on spironolactone. - Continue rifaximin 550 mg po bid.    - Hospice has been consulted and patient/family is considering. Hyponatremia   - Nephrology input appreciated. - Serum Na+ improved today (132). - Continue 1200 cc fluid restriction. Anemia  Thrombocytopenia   - Thrombocytopenia 2/2 liver disease.  - Platelet count slightly lower today. - No sign of active hemorrhage.    - No tx indicated at this time, consider transfusion for Hgb <7.0  - Monitor. Wall thickening involvine ascending and transverse colon  - Likely related to portal colopathy. -  - GI following as noted above. - Monitor clinically. COPD  - Continue budesonide 250 mcg nebs bid. Hypothyroidism   - Continue levothyroxine 112 mcg po daily. CAD s/p remote PCI/stent  HTN  Dyslipidemia   - Continue asa 81 mg po daily.   - Diuretic as noted above. - Not on lipid lowering agent 2/2 cirrhosis. Vitamin B12 deficiency   - Continue cyanocobalamin 1000 mcg IM q30d. Depression    - Continue sertraline 50 mg po daily. Generalized deconditioning   Weakness  Recurrent falls    25.0 - 29.9 Overweight / Body mass index is 25.58 kg/m². Code status: DNR  Prophylaxis: SCD's  Recommended Disposition: SNF/LTC     Subjective:     Chief Complaint / Reason for Physician Visit  No complaints. Denies SOB. Plan of care and pertinent events reviewed with bedside nurse. Review of Systems:  Symptom Y/N Comments  Symptom Y/N Comments   Fever/Chills N   Chest Pain N    Poor Appetite Y   Edema Y    Cough N   Abdominal Pain N    Sputum N   Joint Pain N    SOB/CHAN N   Pruritis/Rash N    Nausea/vomit N   Tolerating PT/OT     Diarrhea N   Tolerating Diet Y    Constipation    Other       Could NOT obtain due to:      Objective:     VITALS:   Last 24hrs VS reviewed since prior progress note.  Most recent are:  Patient Vitals for the past 24 hrs:   Temp Pulse Resp BP SpO2   02/09/22 1500 97.6 °F (36.4 °C) 93 18 (!) 109/59 96 % 02/09/22 0800 97.4 °F (36.3 °C) 77 18 111/61 98 %   02/09/22 0752 -- -- -- -- 97 %   02/08/22 2321 97.9 °F (36.6 °C) 76 18 (!) 107/59 97 %   02/08/22 2056 -- -- -- -- 97 %   02/08/22 1928 98.3 °F (36.8 °C) 78 18 113/61 98 %     No intake or output data in the 24 hours ending 02/09/22 1657     I had a face to face encounter and independently examined this patient on 2/9/2022, as outlined below:  PHYSICAL EXAM:  General:  A/A/O.  NAD. HEENT:  Normocephalic. Sclera slightly icteric. Mucous membranes moist.    Chest:  Resps even/unlabored with symmetrical CWE. Air entry diminished at bases. Lungs CTA. No use of accessory muscles. CV:  RRR. Normal S1/S2. No M/C/R appreciated. No JVD. 1 mm pitting pretibial edema corrina. Cap refill < 3 sec. Peripheral pulses 1+. GI:  Abdomen soft/NT/ND. No significant ascites. ABT X 4.    :  Voiding. Neurologic:  Nonfocal.  CN II-XII grossly intact. Face symmetrical.  Speech normal.     Psych:  Cooperative. No anxiety or agitation. Skin:  Warm and color appropriate. No rashes. Turgor elastic. Reviewed most current lab test results and cultures  YES  Reviewed most current radiology test results   YES  Review and summation of old records today    NO  Reviewed patient's current orders and MAR    YES  PMH/SH reviewed - no change compared to H&P  ________________________________________________________________________  Care Plan discussed with:    Comments   Patient [address]     Consultant                        Multidiciplinary team rounds were held today with , nursing, pharmacist and clinical coordinator. Patient's plan of care was discussed; medications were reviewed and discharge planning was addressed.      ________________________________________________________________________  Yuly Ta NP     Procedures: see electronic medical records for all procedures/Xrays and details which were not copied into this note but were reviewed prior to creation of Plan. LABS:  I reviewed today's most current labs and imaging studies.   Pertinent labs include:  Recent Labs     02/09/22 0441 02/08/22 0439 02/07/22  0520   WBC 6.4 10.5 8.5   HGB 10.3* 12.2 11.0*   HCT 30.1* 35.4 32.6*   PLT 77* 91* 85*     Recent Labs     02/09/22 0441 02/08/22 0439 02/07/22  0520   * 128* 130*   K 4.1 4.6 4.5    101 102   CO2 26 24 22   GLU 91 105* 100   BUN 22* 24* 21*   CREA 0.79 1.05* 0.89   CA 8.7 8.6 8.2*   MG 2.2 2.2 2.0   PHOS 2.8 2.8 2.7   INR 1.5* 1.4* 1.4*       Signed: Liban Chandler, NP

## 2022-02-09 NOTE — PROGRESS NOTES
Nephrology Progress Note  James Hobson     www. [facility]  Phone - [phone]   Patient: Rocio Faust    YOB: 1950        Date- 2/9/2022   Admit Date: 2/4/2022  CC: Follow up for      hyponatremia  IMPRESSION & PLAN:   · Hyponatremia(suspect secondary to hypervolemia poor p.o. intake, diuretic use)  · CARBAJAL cirrhosis  · Recurrent ascites  · Thrombocytopenia   Failure to thrive     PLAN-   Sodium is improved from yesterday.  Continue with IV albumin for now.  Noted interval events, patient is now DNR and is undergoing evaluation for hospice care.  Check daily labs for now.  Thank you for the consult, will follow with you     Subjective: Interval History:   -Feels better today.  -Sodium is better    Objective:   Vitals:    02/08/22 2056 02/08/22 2321 02/09/22 0752 02/09/22 0800   BP:  (!) 107/59  111/61   Pulse:  76  77   Resp:  18  18   Temp:  97.9 °F (36.6 °C)  97.4 °F (36.3 °C)   SpO2: 97% 97% 97% 98%   Weight:       Height:          No intake/output data recorded. Last 3 Recorded Weights in this Encounter    02/04/22 0929   Weight: 67.6 kg (149 lb)      Physical exam:   GEN: NAD  NECK- Supple, no mass  RESP: No wheezing, Clear b/l  CVS: S1,S2  RRR  NEURO: Normal speech, Non focal  EXT: No Edema   PSYCH: Normal Mood    Chart reviewed. Pertinent Notes reviewed. Data Review :  Recent Labs     02/09/22 0441 02/08/22  1708 02/08/22  0439 02/07/22  0520   *  --  128* 130*   K 4.1  --  4.6 4.5     --  101 102   CO2 26  --  24 22   BUN 22*  --  24* 21*   CREA 0.79  --  1.05* 0.89   GLU 91  --  105* 100   CA 8.7  --  8.6 8.2*   MG 2.2  --  2.2 2.0   PHOS 2.8  --  2.8 2.7   URICA  --  6.6*  --   --      Recent Labs     02/09/22  0441 02/08/22  0439 02/07/22  0520   WBC 6.4 10.5 8.5   HGB 10.3* 12.2 11.0*   HCT 30.1* 35.4 32.6*   PLT 77* 91* 85*     No results for input(s): FE, TIBC, PSAT, FERR in the last 72 hours.    Medication list reviewed  Current Facility-Administered Medications   Medication Dose Route Frequency    spironolactone (ALDACTONE) tablet 50 mg  50 mg Oral DAILY    furosemide (LASIX) tablet 40 mg  40 mg Oral BID    aspirin delayed-release tablet 81 mg  81 mg Oral DAILY    bisacodyL (DULCOLAX) suppository 10 mg  10 mg Rectal DAILY PRN    lactulose (CHRONULAC) 10 gram/15 mL solution 30 mL  20 g Oral DAILY PRN    levothyroxine (SYNTHROID) tablet 112 mcg  112 mcg Oral ACB    magnesium hydroxide (MILK OF MAGNESIA) 400 mg/5 mL oral suspension 30 mL  30 mL Oral DAILY PRN    ondansetron (ZOFRAN ODT) tablet 4 mg  4 mg Oral Q6H PRN    polyethylene glycol (MIRALAX) packet 17 g  17 g Oral DAILY PRN    potassium chloride (K-DUR, KLOR-CON M20) SR tablet 20 mEq  20 mEq Oral DAILY    sertraline (ZOLOFT) tablet 50 mg  50 mg Oral DAILY    budesonide (PULMICORT) 250 mcg/2ml nebulizer susp  250 mcg Nebulization BID RT    rifAXIMin (XIFAXAN) tablet 550 mg  550 mg Oral BID    cyanocobalamin (VITAMIN B12) injection 1,000 mcg  1,000 mcg IntraMUSCular Q30D          Ghulam Alexander MD              [facility]  [facility] / [facility] [address], [address] [place] [address]  Phone - [phone]               Fax - [phone]

## 2022-02-09 NOTE — PROGRESS NOTES
End of Shift Note    Bedside shift change report given to Neda (oncoming nurse) by Robert Henson RN (offgoing nurse).   Report included the following information SBAR, Kardex, ED Summary, Intake/Output, MAR and Recent Results    Shift worked:  [id number]     Shift summary and any significant changes:    Pt tolerating medications, still using purwic with brief, no complaints, pt has poor appetite extra encouragement is provided around meal times   Concerns for physician to address: none   Zone phone for oncoming shift:  none         Robert Henson, RN

## 2022-02-10 NOTE — PROGRESS NOTES
Physical Therapy    Note in chart that pt is now returning to LTC on hospice. Will sign off at this time.     Lane Rogel, PT

## 2022-02-10 NOTE — HOSPICE
[facility]  [phone] PRE-Admission   Discharge Summary  Patient Name: Willis Brooks  YOB: 1950  Age: 73 y.o. Date of PLANNED Hospice Admission: 22  Hospice Attending: MARILEE on admission  Primary Care Physician: Christine Lozada MD     Home Hospice Address:   [address] [place] [address],[address]   [address]     Primary Contact and Phone: Mikal Collins [phone]      ADVANCE CARE PLANNING    Code Status: DNR  Durable DNR: [x]  Yes  []  No  Advance Care Planning 2022   Patient's Healthcare Decision Maker is: Named in scanned ACP document   Confirm Advance Directive Yes, on file   Patient Would Like to Complete Advance Directive -       Yazidi: NO Restorationism   Home: plans in progress    HOSPICE SUMMARY     Verbal CTI of terminal diagnosis with life expectancy of 6 months or less received from: Dr. Lupillo Magaña    For the Hospice Diagnosis of: Cirrhosis    NCD: Requested       CLINICAL INFORMATION   Allergies: No Known Allergies      Currently this patient has:  [x] Supplemental O2 [x] Peripheral IV  [] PICC    [] PORT   [] Ramos Catheter [] NG Tube   [] PEG Tube [] Ostomy    [] AICD: Has ICD been deactivated? [] Yes [] Wounds      COVID Screening: rapid COVID pending      ASSESSMENT & PLAN     1. Symptom Issues Identified: intermittent confusion    2. Spiritual Issues Identified: none    3. Psych/ Social/ Emotional Issues Identified: pt has 2 sisters who are involved and supportive. Sister, Sharron Wing is main POC. Sister, Renata Rosales spouse  on hospice services with [id number] in the last year. CARE COORDINATION     1. Hospice Consents: signed by pt's sister, Sharron Wing and scanned    2. DME Ordered/Company/Delivery Plan: facility to provide     3. Symptom Kit and other Medications Needs: facility to provide    4. Facility Admission Reservation: 11am    5.  Transportation by: [facility]   Scheduled for: 10:30AM      6. Verbal Report/Handoff given to: liaison to call report tomorrow    7. Phone number to LORI GONZALEZ LATOYA [facility]: [phone]    8.  Supplies/Wound Care: chux, briefs, incontinent supplies

## 2022-02-10 NOTE — PROGRESS NOTES
End of Shift Note    Bedside shift change report given to Bobbi (oncoming nurse) by Cyndee Marino (offgoing nurse). Report included the following information SBAR, Kardex and MAR    Shift worked:  7p-7a     Shift summary and any significant changes:     Scheduled medications have been given, see MAR.  IV's have been flushed and are patent. Patient did not complain of any pain during my shift. Patient was repositioned during my shift. Patient teaching and routine rounding has been done. Concerns for physician to address:       Zone phone for oncoming shift:          Activity:  Activity Level: Bed Rest  Number times ambulated in hallways past shift: 0  Number of times OOB to chair past shift: 0    Cardiac:   Cardiac Monitoring: No      Cardiac Rhythm: Sinus Rhythm    Access:   Current line(s): PIV     Genitourinary:   Urinary status: external catheter    Respiratory:   O2 Device: None (Room air)  Chronic home O2 use?: NO  Incentive spirometer at bedside: NO     GI:  Last Bowel Movement Date: 02/06/22  Current diet:  ADULT ORAL NUTRITION SUPPLEMENT Breakfast, Dinner; Standard 4 oz  ADULT ORAL NUTRITION SUPPLEMENT Lunch, Dinner; Frozen Supplement  ADULT DIET Regular; Low Sodium (2 gm); Chocolate Ensure  Passing flatus: YES  Tolerating current diet: YES       Pain Management:   Patient states pain is manageable on current regimen: YES    Skin:  Mark Score: 14  Interventions: float heels and internal/external urinary devices    Patient Safety:  Fall Score:  Total Score: 2  Interventions: bed/chair alarm, assistive device (walker, cane, etc) and pt to call before getting OOB  High Fall Risk: Yes    Length of Stay:  Expected LOS: 4d 16h  Actual LOS: [facility]

## 2022-02-10 NOTE — HOSPICE
Hospice RN Note: family and patient ready to move forward with hospice and would like to discharge back to [facility]. [address] will meet with pt and family this afternoon to sign consents and review discharge plan. Spoke with Lacey LONGO and she will set up transport tomorrow morning for an 11am admission at [facility]. Full updated note to follow later.

## 2022-02-10 NOTE — PROGRESS NOTES
Transition of Care Plan:    RUR: 21%  Disposition: Return [facility]TC-[facility], Hospice info session-[facility]   Follow up appointments: Follow up with PCP and/or Specialist   DME needed: onsite at facility   Transportation at Discharge: BLS transport-[facility] transport at [address] [place] [address] on 2/11/22  [place] or means to access home:  N/A  IM Medicare Letter: 2nd IM Medicare Letter to be given   Is patient a BCPI-A Bundle: CM to provide if required     If yes, was Bundle Letter given?:    Is patient a  and connected with the [place]? N/A              If yes, was [employer] transfer form completed and VA notified? Caregiver Contact: José Luis Combs (sister) [phone]  Discharge Caregiver contacted prior to discharge? Family to be contacted   Care Conference needed?:               Not a this time    UPDATE: 3:33PM    CM was asked by [facility] SW, if transport for pt can be changed to 10A vs 11A. CM called and switched transport time with [facility]. CM informed snf: [address] [place] [address] liaison of the following transport time. CM will provide pt with transport packet. CM will continue to follow. JOHNATHAN Castaneda, [place]      UPDATE: 12:01PM      CM informed by [facility] liaison that pt's family is agreeable for pt to return back to LTC at [address] [place] [address] with hospice, provided by [employer]. CM informed by [facility] liaison that start of care will be initiated on 2/11/22. CM was asked to arranged [facility] transport at 11A (completed by CM). CM attempted to contact LTC admissions coordinator at [address] [place] [address], via telephone to inform her of the following. However, attempt was unsuccessful and CM left message, requesting a return call. CM will continue to follow. JOHNATHAN Castaneda, [place]      INITIAL NOTE: CM received call from pt's sister: José Luis Combs, via telephone regarding pt's d/c plans and needs. Ollis Severs reported that she and her family are ready to proceed with hospice to assist with pt's care.   Pt is known to be LTC reside at [facility]. CM spoke with [facility] SW: Luh, via telephone to inform her of the following. [employer] will make contact with pt's family on today to determine pt's need for hospice services once returning back to the LTC: [facility].     JOHNATHAN Peterson, [address] [place] [address]

## 2022-02-10 NOTE — PROGRESS NOTES
Nephrology Progress Note  James Hobson     [facility]. [facility]  Phone - [phone]   Patient: Sepideh Santana    YOB: 1950        Date- 2/10/2022   Admit Date: 2/4/2022  CC: Follow up for      hyponatremia  IMPRESSION & PLAN:   · Hyponatremia(suspect secondary to hypervolemia poor p.o. intake, diuretic use)  · CARBAJAL cirrhosis  · Recurrent ascites  · Thrombocytopenia   Failure to thrive     PLAN-   Sodium is stable   Continue with IV albumin for now.  Noted interval events, patient is now DNR and is undergoing evaluation for hospice care.  Check daily labs for now.  Thank you for the consult, will follow with you     Subjective: Interval History:   -Feels better today.  -Sodium stable    Objective:   Vitals:    02/09/22 2240 02/10/22 0449 02/10/22 0725 02/10/22 0815   BP:  121/62  (!) 116/57   Pulse:  79  77   Resp:  18  16   Temp:  97.7 °F (36.5 °C)  97.6 °F (36.4 °C)   SpO2: 95% 97% 96% 97%   Weight:       Height:          No intake/output data recorded. Last 3 Recorded Weights in this Encounter    02/04/22 0929   Weight: 67.6 kg (149 lb)      Physical exam:   GEN: NAD  NECK- Supple, no mass  RESP: No wheezing, Clear b/l  CVS: S1,S2  RRR  NEURO: Normal speech, Non focal  EXT: No Edema   PSYCH: Normal Mood    Chart reviewed. Pertinent Notes reviewed. Data Review :  Recent Labs     02/10/22  0426 02/09/22  0441 02/08/22  1708 02/08/22 0439   * 132*  --  128*   K 4.2 4.1  --  4.6   CL 98 101  --  101   CO2 24 26  --  24   BUN 20 22*  --  24*   CREA 0.89 0.79  --  1.05*   GLU 91 91  --  105*   CA 8.8 8.7  --  8.6   MG  --  2.2  --  2.2   PHOS  --  2.8  --  2.8   URICA  --   --  6.6*  --      Recent Labs     02/10/22  0426 02/09/22  0441 02/08/22  0439   WBC 8.1 6.4 10.5   HGB 11.0* 10.3* 12.2   HCT 32.7* 30.1* 35.4   PLT 68* 77* 91*     No results for input(s): FE, TIBC, PSAT, FERR in the last 72 hours.    Medication list  reviewed  Current Facility-Administered Medications   Medication Dose Route Frequency    albumin human 25% (BUMINATE) solution 12.5 g  12.5 g IntraVENous Q6H    spironolactone (ALDACTONE) tablet 50 mg  50 mg Oral DAILY    furosemide (LASIX) tablet 40 mg  40 mg Oral BID    aspirin delayed-release tablet 81 mg  81 mg Oral DAILY    bisacodyL (DULCOLAX) suppository 10 mg  10 mg Rectal DAILY PRN    lactulose (CHRONULAC) 10 gram/15 mL solution 30 mL  20 g Oral DAILY PRN    levothyroxine (SYNTHROID) tablet 112 mcg  112 mcg Oral ACB    magnesium hydroxide (MILK OF MAGNESIA) 400 mg/5 mL oral suspension 30 mL  30 mL Oral DAILY PRN    ondansetron (ZOFRAN ODT) tablet 4 mg  4 mg Oral Q6H PRN    polyethylene glycol (MIRALAX) packet 17 g  17 g Oral DAILY PRN    potassium chloride (K-DUR, KLOR-CON M20) SR tablet 20 mEq  20 mEq Oral DAILY    sertraline (ZOLOFT) tablet 50 mg  50 mg Oral DAILY    budesonide (PULMICORT) 250 mcg/2ml nebulizer susp  250 mcg Nebulization BID RT    rifAXIMin (XIFAXAN) tablet 550 mg  550 mg Oral BID    cyanocobalamin (VITAMIN B12) injection 1,000 mcg  1,000 mcg IntraMUSCular Q30D          Dariana Regalado MD              [facility]  [facility] / [facility], [address]  [place], [address]  Phone - [phone]               Fax - [phone]

## 2022-02-10 NOTE — PROGRESS NOTES
met pt's sisters, they asked  to make a follow-up visit for Mrs. Jose Juan Castillo in 1114.  explored their concerns and worries, they were very supportive to their sister, brought stuffed dog for her. Mrs. Jose Juan Castillo said she is doing well, no complaint, satisfied with her new gift from her sisters. She said she is feeling a little bit better. She denied any concerns or worries. Wished ongoing healing and recovery, she was thankful for 's visit.      [address] [facility] Fartun Arroyo,[place], [place] [address] Provider   Paging Service [phone])

## 2022-02-10 NOTE — PROGRESS NOTES
Hospitalist Progress Note    NAME: Katie Gaona   :  1950   MRN:  [id number]     HPI excerpted from admision H&P:  Charline Moran is a 73 y.o. female with liver biopsy proven liver cirrhosis 2021, seen by Dr. Oleg Gold 10/21, recent admission  for recurrent falls, generalized weakness from B12 deficiency sent from [facility] [place] [facility] for recurrent abdominal distention. She is getting rehab since discharge on 21. Patient previously not had decompensated liver cirrhosis. Had thrombocytopenia and mild coagulopathy. On 22, sent to ER for abdominal distention. Had US guided paracentesis with removal of 7100ml clear yellow ascites. At [facility], she is on aldactone 25mg and lasix 20mg daily.      She is sent back to ER today for recurrent ascites with recurrent increased abdominal distention. Denies fever, chills, n/v.  CT abdomen without contrast with cirrhosis, moderate to large volume ascites, wall thickening involving ascending and transverse colon. \"    Assessment / Plan:  CARBAJAL cirrhosis  Recurrent ascities    CT abdomen/pelvis 22:  1. Cirrhosis. Moderate to large volume ascites. 2.  Wall thickening involving the ascending and transverse colon, maybe on the basis of portal colopathy, but correlate for infectious or inflammatory colitis. 3.  Healing lateral right 8th-10th rib fractures. 4.  Cholelithiasis. Ascites fluid culture 22:  NG at 4 days. - GI input appreciated. - Palliative care input appreciated. - MELD = 18.  - INR 1.4 today. - S/P US-guided paracentesis in ED for 5600 cc with repeat paracentesis on 22 for 3100 cc, discussed peritoneal drain again today and patient stated that if she re-accumulated fluid she would consider a drain.  - Patient to complete 8 doses of albumin 12.5 gm IV q6h today. - Continue furosemide 40 mg po bid. - Continue spironolactone 50 mg po daily.     - Continue KCl 20 mEq po daily, will need to monitor serum K+ since on spironolactone. - Continue rifaximin 550 mg po bid.    - Hospice has been consulted and patient/family is considering. Hyponatremia   - Nephrology input appreciated. - Serum Na+ down slightly to 130 today. - Continue 1200 cc fluid restriction. Anemia  Thrombocytopenia   - Thrombocytopenia 2/2 liver disease.  - Platelet count continues slow downward trend. - No sign of active hemorrhage.    - No tx indicated at this time, consider transfusion for Hgb <7.0  - Monitor. Wall thickening involvine ascending and transverse colon  - Likely related to portal colopathy.  - GI following as noted above. - Monitor clinically. COPD  - Continue budesonide 250 mcg nebs bid. Hypothyroidism   - Continue levothyroxine 112 mcg po daily. CAD s/p remote PCI/stent  HTN  Dyslipidemia   - Continue asa 81 mg po daily.   - Diuretic as noted above. - Not on lipid lowering agent 2/2 cirrhosis. Vitamin B12 deficiency   - Continue cyanocobalamin 1000 mcg IM q30d. Depression    - Continue sertraline 50 mg po daily. Generalized deconditioning   Weakness  Recurrent falls  - PT/OT and supportive care. 25.0 - 29.9 Overweight / Body mass index is 25.58 kg/m². Code status: DNR  Prophylaxis: SCD's  Recommended Disposition: SNF/LTC     Subjective:     Chief Complaint / Reason for Physician Visit  No complaints. Denies SOB. Plan of care and pertinent events reviewed with bedside nurse. Review of Systems:  Symptom Y/N Comments  Symptom Y/N Comments   Fever/Chills N   Chest Pain N    Poor Appetite Y   Edema Y    Cough N   Abdominal Pain N    Sputum N   Joint Pain N    SOB/CHAN N   Pruritis/Rash N    Nausea/vomit N   Tolerating PT/OT     Diarrhea N   Tolerating Diet Y    Constipation    Other       Could NOT obtain due to:      Objective:     VITALS:   Last 24hrs VS reviewed since prior progress note.  Most recent are:  Patient Vitals for the past 24 hrs:   Temp Pulse Resp BP SpO2   02/10/22 0815 97.6 °F (36.4 °C) 77 16 (!) 116/57 97 %   02/10/22 0725 -- -- -- -- 96 %   02/10/22 0449 97.7 °F (36.5 °C) 79 18 121/62 97 %   02/09/22 2240 -- -- -- -- 95 %   02/09/22 2023 97.3 °F (36.3 °C) 86 18 111/60 96 %   02/09/22 1500 97.6 °F (36.4 °C) 93 18 (!) 109/59 96 %     No intake or output data in the 24 hours ending 02/10/22 1027     I had a face to face encounter and independently examined this patient on 2/10/2022, as outlined below:  PHYSICAL EXAM:  General:  A/A/O.  NAD. HEENT:  Normocephalic. Sclera slightly icteric. Mucous membranes moist.    Chest:  Resps even/unlabored with symmetrical CWE. Air entry diminished at bases. Lungs CTA. No use of accessory muscles. CV:  RRR. Normal S1/S2. No M/C/R appreciated. No JVD. 1 mm pitting pretibial edema corrina. Cap refill < 3 sec. Peripheral pulses 1+. GI:  Abdomen soft/NT. Slightly distended. ABT X 4.    :  Voiding. Neurologic:  Nonfocal.  CN II-XII grossly intact. Face symmetrical.  Speech normal.     Psych:  Cooperative. No anxiety or agitation. Skin:  Warm and color appropriate. No rashes. Turgor elastic. Reviewed most current lab test results and cultures  YES  Reviewed most current radiology test results   YES  Review and summation of old records today    NO  Reviewed patient's current orders and MAR    YES  PMH/ reviewed - no change compared to H&P  ________________________________________________________________________  Care Plan discussed with:    Comments   [address] [place] [address]     Consultant                        Multidiciplinary team rounds were held today with , nursing, pharmacist and clinical coordinator. Patient's plan of care was discussed; medications were reviewed and discharge planning was addressed.      ________________________________________________________________________  Mittie Pay, NP     Procedures: see electronic medical records for all procedures/Xrays and details which were not copied into this note but were reviewed prior to creation of Plan. LABS:  I reviewed today's most current labs and imaging studies.   Pertinent labs include:  Recent Labs     02/10/22  0426 02/09/22  0441 02/08/22  0439   WBC 8.1 6.4 10.5   HGB 11.0* 10.3* 12.2   HCT 32.7* 30.1* 35.4   PLT 68* 77* 91*     Recent Labs     02/10/22  0426 02/09/22  0441 02/08/22  0439   * 132* 128*   K 4.2 4.1 4.6   CL 98 101 101   CO2 24 26 24   GLU 91 91 105*   BUN 20 22* 24*   CREA 0.89 0.79 1.05*   CA 8.8 8.7 8.6   MG  --  2.2 2.2   PHOS  --  2.8 2.8   ALB 3.5  --   --    TBILI 4.5*  --   --    ALT 62  --   --    INR 1.4* 1.5* 1.4*       Signed: Racheal Harden NP

## 2022-02-11 NOTE — PROGRESS NOTES
Nurse called back with different information about the patient not having had the Ensure this morning as she stated last time. We will proceed with the procedure if there is adequate fluid.

## 2022-02-11 NOTE — PROGRESS NOTES
Pt arrives to IR department as an IP for a peritoneal Aspira catheter placement. Pt is A&Ox4 and has no c/o pain. Pt is awaiting procedural consent at this time.

## 2022-02-11 NOTE — PROGRESS NOTES
Seen and examined, interval events noted. Patient to be sent to SNF with hospice. We will sign off call if needed please.

## 2022-02-11 NOTE — PROGRESS NOTES
Nutrition Note    Chart reviewed for follow up. Plans are for pt to discharge with hospice. Will sign off. Allow meals and supplements as pt requests.      Electronically signed by Madeleine Watts RD on 2/11/2022 at 4:53 PM    Contact: [phone]

## 2022-02-11 NOTE — PROGRESS NOTES
Problem: Falls - Risk of  Goal: *Absence of Falls  Description: Document Any Reddy Fall Risk and appropriate interventions in the flowsheet.   Outcome: Progressing Towards Goal  Note: Fall Risk Interventions:  Mobility Interventions: Patient to call before getting OOB    Mentation Interventions: Bed/chair exit alarm    Medication Interventions: Patient to call before getting OOB    Elimination Interventions: Call light in reach    History of Falls Interventions: Bed/chair exit alarm,Door open when patient unattended,Room close to nurse's station         Problem: Patient Education: Go to Patient Education Activity  Goal: Patient/Family Education  Outcome: Progressing Towards Goal

## 2022-02-11 NOTE — PROGRESS NOTES
Pt has decided to wait for Aspira catheter placement until next week once more fluid has built up and catheter can be placed higher on abdomen and not on her pant line. Pt just had a paracentesis 2 days ago and minimal fluid has built back up for catheter to be placed higher for pt to be comfortable with. Verbal report given to [facility] RN of pts decision and further plan of care.

## 2022-02-11 NOTE — PROGRESS NOTES
Hospitalist Progress Note    NAME: Femi Dominguez   :  1950   MRN:  [id number]     HPI excerpted from admision H&P:  Cuba Olivera is a 73 y.o. female with liver biopsy proven liver cirrhosis 2021, seen by Dr. Castillo [address] 10/21, recent admission  for recurrent falls, generalized weakness from B12 deficiency sent from [facility] for recurrent abdominal distention. She is getting rehab since discharge on 21. Patient previously not had decompensated liver cirrhosis. Had thrombocytopenia and mild coagulopathy. On 22, sent to ER for abdominal distention. Had US guided paracentesis with removal of 7100ml clear yellow ascites. At [facility], she is on aldactone 25mg and lasix 20mg daily.      She is sent back to ER today for recurrent ascites with recurrent increased abdominal distention. Denies fever, chills, n/v.  CT abdomen without contrast with cirrhosis, moderate to large volume ascites, wall thickening involving ascending and transverse colon. \"    Assessment / Plan:  CARBAJAL cirrhosis  Recurrent ascities    CT abdomen/pelvis 22:  1. Cirrhosis. Moderate to large volume ascites. 2.  Wall thickening involving the ascending and transverse colon, maybe on the basis of portal colopathy, but correlate for infectious or inflammatory colitis. 3.  Healing lateral right 8th-10th rib fractures. 4.  Cholelithiasis. Ascites fluid culture 22:  NG at 4 days. - GI input appreciated. - Palliative care input appreciated. - MELD = 18.  - INR 1.4 today. - S/P US-guided paracentesis in ED for 5600 cc with repeat paracentesis on 22 for 3100 cc. - Today patient's abdomen is more distended and she complains of mild discomfort/bloating.    - Discussed peritoneal drain and patient wishes to move forward with having this placed, orders placed and patient made npo. - Patient to complete 8 doses of albumin 12.5 gm IV q6h on 02/10/22.    - Continue furosemide 40 mg po bid.    - Continue spironolactone 50 mg po daily. - Continue KCl 20 mEq po daily, will need to monitor serum K+ since on spironolactone. - Continue rifaximin 550 mg po bid. - Plan for discharge to [facility] with hospice. Hyponatremia   - Nephrology input appreciated. - Serum Na+ down slightly to 130 on 02/10/22  - Continue 1200 cc fluid restriction. Anemia  Thrombocytopenia   - Thrombocytopenia 2/2 liver disease.  - No sign of active hemorrhage.    - Plan to discharge on hospice. Wall thickening involvine ascending and transverse colon  - Likely related to portal colopathy.  - GI following as noted above. - Monitor clinically. COPD  - Continue budesonide 250 mcg nebs bid. Hypothyroidism   - Continue levothyroxine 112 mcg po daily. CAD s/p remote PCI/stent  HTN  Dyslipidemia   - Continue asa 81 mg po daily.   - Diuretic as noted above. - Not on lipid lowering agent 2/2 cirrhosis. Vitamin B12 deficiency   - Continue cyanocobalamin 1000 mcg IM q30d. Depression    - Continue sertraline 50 mg po daily. Generalized deconditioning   Weakness  Recurrent falls  - PT/OT and supportive care. 25.0 - 29.9 Overweight / Body mass index is 25.58 kg/m². Code status: DNR  Prophylaxis: SCD's  Recommended Disposition: SNF/LTC     Subjective:     Chief Complaint / Reason for Physician Visit  Patient complains of abdominal fullness this a.m. Plan of care and pertinent events reviewed with bedside nurse. Review of Systems:  Symptom Y/N Comments  Symptom Y/N Comments   Fever/Chills N   Chest Pain N    Poor Appetite Y   Edema Y    Cough N   Abdominal Pain N    Sputum N   Joint Pain N    SOB/CHAN N   Pruritis/Rash N    Nausea/vomit N   Tolerating PT/OT     Diarrhea N   Tolerating Diet Y    Constipation    Other       Could NOT obtain due to:      Objective:     VITALS:   Last 24hrs VS reviewed since prior progress note.  Most recent are:  Patient Vitals for the past 24 hrs:   Temp Pulse Resp BP SpO2   02/11/22 0900 -- -- -- -- 96 %   02/11/22 0849 97.5 °F (36.4 °C) 83 18 122/69 95 %   02/11/22 0348 98.5 °F (36.9 °C) 72 18 122/77 94 %   02/10/22 2231 -- -- -- -- 94 %   02/10/22 2002 98.2 °F (36.8 °C) 70 18 124/70 98 %   02/10/22 1449 98.3 °F (36.8 °C) 89 17 (!) 115/58 98 %       Intake/Output Summary (Last 24 hours) at 2/11/2022 1002  Last data filed at 2/11/2022 0349  Gross per 24 hour   Intake 150 ml   Output 350 ml   Net -200 ml        I had a face to face encounter and independently examined this patient on 2/11/2022, as outlined below:  PHYSICAL EXAM:  General:  A/A/O.  NAD. HEENT:  Normocephalic. Sclera slightly icteric. Mucous membranes moist.    Chest:  Resps even/unlabored with symmetrical CWE. Air entry diminished at bases. Lungs CTA. No use of accessory muscles. CV:  RRR. Normal S1/S2. No M/C/R appreciated. No JVD. 1 mm pitting pretibial edema corrina. Cap refill < 3 sec. Peripheral pulses 1+. GI:  Abdomen soft/NT. More distended today. ABT X 4.    :  Voiding. Neurologic:  Nonfocal.  CN II-XII grossly intact. Face symmetrical.  Speech normal.     Psych:  Cooperative. No anxiety or agitation. Skin:  Warm and color appropriate. No rashes. Turgor elastic. Reviewed most current lab test results and cultures  YES  Reviewed most current radiology test results   YES  Review and summation of old records today    NO  Reviewed patient's current orders and MAR    YES  PMH/SH reviewed - no change compared to H&P  ________________________________________________________________________  Care Plan discussed with:    Comments   Patient [address] [place] [address]     Consultant                        Multidiciplinary team rounds were held today with , nursing, pharmacist and clinical coordinator. Patient's plan of care was discussed; medications were reviewed and discharge planning was addressed. ________________________________________________________________________  Halima Trivedi NP     Procedures: see electronic medical records for all procedures/Xrays and details which were not copied into this note but were reviewed prior to creation of Plan. LABS:  I reviewed today's most current labs and imaging studies.   Pertinent labs include:  Recent Labs     02/10/22  0426 02/09/22  0441   WBC 8.1 6.4   HGB 11.0* 10.3*   HCT 32.7* 30.1*   PLT 68* 77*     Recent Labs     02/11/22  0253 02/10/22  0426 02/09/22  0441   NA  --  130* 132*   K  --  4.2 4.1   CL  --  98 101   CO2  --  24 26   GLU  --  91 91   BUN  --  20 22*   CREA  --  0.89 0.79   CA  --  8.8 8.7   MG  --   --  2.2   PHOS  --   --  2.8   ALB  --  3.5  --    TBILI  --  4.5*  --    ALT  --  62  --    INR 1.4* 1.4* 1.5*       Signed: Halima Trivedi NP

## 2022-02-11 NOTE — PROGRESS NOTES
End of Shift Note    Bedside shift change report given to GUSTAVO Delcid (oncoming nurse) by Sonia Poole RN (offgoing nurse). Report included the following information SBAR, Kardex, Intake/Output and MAR    Shift worked:  [id number]     Shift summary and any significant changes:     Patient stable through shift, no complaints of pain, all scheduled meds, incontinent care and frequent rounding provided. 2 sisters took turns at bedside in the afternoon. Concerns for physician to address:       Zone phone for oncoming shift:          Activity:  Activity Level: Bed Rest  Number times ambulated in hallways past shift: 0  Number of times OOB to chair past shift: 0    Cardiac:   Cardiac Monitoring: No      Cardiac Rhythm: Sinus Rhythm    Access:   Current line(s): PIV     Genitourinary:   Urinary status: external catheter    Respiratory:   O2 Device: None (Room air)  Chronic home O2 use?: NO  Incentive spirometer at bedside: NO     GI:  Last Bowel Movement Date: 02/06/22  Current diet:  ADULT ORAL NUTRITION SUPPLEMENT Breakfast, Dinner; Standard 4 oz  ADULT ORAL NUTRITION SUPPLEMENT Lunch, Dinner; Frozen Supplement  ADULT DIET Regular; Low Sodium (2 gm); 1200 ml; Chocolate Ensure  Passing flatus: YES  Tolerating current diet: YES       Pain Management:   Patient states pain is manageable on current regimen: YES    Skin:  Mark Score: 14  Interventions: speciality bed, float heels, PT/OT consult, internal/external urinary devices and nutritional support     Patient Safety:  Fall Score:  Total Score: 2  Interventions: assistive device (walker, cane, etc), gripper socks and pt to call before getting OOB  High Fall Risk: Yes    Length of Stay:  Expected LOS: 4d 16h  Actual LOS: [address] [facility]

## 2022-02-11 NOTE — PROGRESS NOTES
Chart reviewed for possible procedure in radiology, due to the patient not having been NPO the procedure will not be done today. This should not hold up discharge as we can do this procedure as an outpatient next week.

## 2022-02-11 NOTE — PROGRESS NOTES
Transition of Care Plan:    RUR: 21%  Disposition: Return LTC-[facility], Hospice info session-Saul Dixon, drain placement   Follow up appointments: Follow up with PCP and/or Specialist   DME needed: onsite at facility   Transportation at Discharge: BLS transport-[facility] transport at [address] on 2/11/22  [place] or means to access home:  N/A  IM Medicare Letter: 2nd IM Medicare Letter to be given   Is patient a BCPI-A Bundle: CM to provide if required     If yes, was Bundle Letter given?:    Is patient a [place] and connected with the [place]? N/A              If yes, was [employer] transfer form completed and [facility] notified? Caregiver Contact: Carmne Perez (sister) [phone]  Discharge Caregiver contacted prior to discharge? Family to be contacted   Care Conference needed?:               Not a this time      CM informed by physician that pt will remain in the hospital for a drain placement. CM contact  Hospice to inform them of the following. CM informed [facility] of the following drain placement, and was informed that pt can be accepted over the weekend, once drain is placed. CM placed pt's transport on will call (nursing staff will arrange transport over the weekend). CM will continue to follow.     JOHNATHAN Askew, [address] [place] [address]

## 2022-02-11 NOTE — PROGRESS NOTES
.End of Shift Note    Bedside shift change report given to  (oncoming nurse) by Amaya Foster RN (offgoing nurse). Report included the following information SBAR, Intake/Output, MAR and Recent Results    Shift worked:   7 - 7 p     Shift summary and any significant changes:     Patient was not able to have Aspira catheter placed because she wanted it placed at waist line and they were unable, was not enough fluid to place a waistline. She will have one placed as an outpatient. Patient placed on a 1200cc fluid restriction. Concerns for physician to address:       Zone phone for oncoming shift:          Activity:  Activity Level: Bed Rest  Number times ambulated in hallways past shift: 0  Number of times OOB to chair past shift: 0    Cardiac:   Cardiac Monitoring: No      Cardiac Rhythm: Sinus Rhythm    Access:   Current line(s): PIV     Genitourinary:   Urinary status: external catheter    Respiratory:   O2 Device: None (Room air)  Chronic home O2 use?: NO  Incentive spirometer at bedside: NO     GI:  Last Bowel Movement Date: 02/06/22  Current diet:  ADULT ORAL NUTRITION SUPPLEMENT Breakfast, Dinner; Standard 4 oz  ADULT ORAL NUTRITION SUPPLEMENT Lunch, Dinner; Frozen Supplement  Passing flatus: YES  Tolerating current diet: YES       Pain Management:   Patient states pain is manageable on current regimen: YES    Skin:  Mark Score: 14  Interventions: float heels    Patient Safety:  Fall Score:  Total Score: 2  Interventions: gripper socks and pt to call before getting OOB  High Fall Risk: Yes    Length of Stay:  Expected LOS: 4d 16h  Actual LOS: [address] RN

## 2022-02-11 NOTE — PROGRESS NOTES
1930--bedside report received from razia gomez pt resting in bed oriented x 4, has  No complaints at this time call bell in reach assessment as noted    2200--purewick changed, bedsheets changed    0300--gown changed after spilling icecream, am labs and urine spec.  Sent to lab per orders, call bell in reach    0715--bedside report given to razia peralta who is assuming care of pt

## 2022-02-12 NOTE — PROGRESS NOTES
I have reviewed discharge instructions with the caregiver. The caregiver verbalized understanding. Called and gave report to Charge nurse on [address] room 701 at [facility]. Pt verbalized possession of all her belongings. Removed PIV. Answered all questions. Transport scheduled for noon. Transport changed to 1700. Transport changed to 2130. Hospice changed transport from [facility] to [facility] for tomorrow morning. No acute events. Report given to [facility] at shift change.

## 2022-02-12 NOTE — HOSPICE
[facility]  [phone]     Patient Name: Osmany Nguyen  YOB: 1950  Age: 73 y.o. [address] [facility] RN Note:  Discharge rescheduled for tomorrow. [facility] giving time of 9:30 p.m. which is too late for admission to [facility]. Hospice SW to arrange transport with [facility] first thing in the morning. Mercedes Kincaid RN updated as well as family.     Brock Davis RN  Clinical Liaison  [phone]

## 2022-02-12 NOTE — PROGRESS NOTES
Transition of Care Plan to SNF/Rehab    SNF/Rehab Transition:  Patient has been accepted to [facility], Room 701 on Spring unit and meets criteria for admission. Patient will transported by [facility] and expected to leave at 12:30 PM. (now delayed to ETA 5 PM)    Communication to Patient/Family:  Talked with pt's sister, Kenrick Celeste, via phone and they are agreeable to the transition plan. Communication to SNF/Rehab:  Bedside RN, Raul Coppola, has been notified to update the transition plan to the facility and call report (phone number [phone]). Discharge information has been updated on the AVS.     Discharge instructions have been faxed to facility at [facility] [phone])     Nursing Please include all hard scripts for controlled substances, med rec and dc summary, and AVS in packet. Reviewed and confirmed with facility, [facility], can manage the patient care needs for the following:     Kara March with (X) only those applicable:    Medication:  [x]  Medications will be available at the facility  []  IV Antibiotics  []  Controlled Substance - hard copy to be sent with patient   []  Weekly Labs   Documents:  [] Hard RX  [] MAR  [] Kardex  [x] AVS  []Transfer Summary  [x]Discharge   Equipment:  []  CPAP/BiPAP  []  Wound Vacuum  []  Ramos or Urinary Device  []  PICC/Central Line  []  Nebulizer  []  Ventilator   Treatment:  []Isolation (for MRSA, VRE, etc.)  []Surgical Drain Management  []Tracheostomy Care  []Dressing Changes  []Dialysis with transportation and chair time  []PEG Care  []Oxygen  []Daily Weights for Heart Failure   Dietary:  []Any diet limitations  []Tube Feedings   []Total Parenteral Management (TPN)   Eligible for Medicaid Long Term Services and Supports  Yes:  [] Eligible for medical assistance or will become eligible within 180 days and UAI completed. [] Provider/Patient and/or support system has requested screening.   [] UAI copy provided to patient or responsible party  [] UAI unavailable at discharge will send once processed to SNF provider. [] UAI unavailable at discharged mailed to patient  No:   [] Private pay and is not financially eligible for Medicaid within the next 180 days. [] Reside out-of-state.   [] A residents of a state owned/operated facility that is licensed  by [facility] and [facility] or [facility]  [] Enrollment in [facility] hospice services  [] [address]  [] Patient /Family declines to have screening completed or provide financial information for screening     Financial Resources:  Medicaid    [] Initiated and application pending   [] Full coverage     Advanced Care Plan:  []Surrogate Decision Maker of Care  [x]POA  [x]Communicated Code Status DNR   Other     Aminta Gutierrez [address], [address]

## 2022-02-12 NOTE — DISCHARGE INSTRUCTIONS
Cirrhosis: Care Instructions  Overview     Cirrhosis occurs when healthy tissue in your liver gets scarred. This keeps the liver from working well. It usually happens after a liver has been inflamed for years. Cirrhosis is most often caused by alcohol use disorder or hepatitis infection. But there are other causes too. These include medicines and too much fat in the liver. Conditions passed down in families and other disorders can also cause it. In some cases, no cause can be found. Treatment can't completely fix liver damage. But you may be able to slow or prevent more damage if you don't drink alcohol or take medicines, drugs, or supplements that harm your liver. Follow-up care is a key part of your treatment and safety. Be sure to make and go to all appointments, and call your doctor if you are having problems. It's also a good idea to know your test results and keep a list of the medicines you take. How can you care for yourself at home? · Do not drink any alcohol. It can harm your liver. Talk to your doctor if you need help to stop drinking. · Be safe with medicines. Take your medicines exactly as prescribed. Call your doctor if you think you are having a problem with your medicine. · Talk to your doctor before you take any other medicines. These include over-the-counter medicines and herbal products. · Be careful taking acetaminophen (Tylenol), ibuprofen (Advil, Motrin), or naproxen (Aleve). These can sometimes cause more liver damage. Talk with your doctor if you're not sure which medicines are safe. · If your cirrhosis causes extra fluid to build up in your body, try not to eat a lot of salt. Use less salt when you cook and at the table. Don't eat fast foods or snack foods with a lot of salt. Extra fluid in your belly, legs, and chest can cause serious problems. · Work with your doctor or a dietitian to be sure you eat the right amount of carbohydrate, protein, fat, and sodium (salt).  It's very important to choose the best foods for the health of your liver. · If your doctor recommends it, limit how much fluid you drink. · If your doctor recommends it, get more exercise. Walking is a good choice. Bit by bit, increase the amount you walk every day. Try for at least 30 minutes on most days of the week. You also may want to swim, bike, or do other activities. When should you call for help? Call 911 anytime you think you may need emergency care. For example, call if:    · You have trouble breathing.     · You vomit blood or what looks like coffee grounds. Call your doctor now or seek immediate medical care if:    · You feel very sleepy or confused.     · You have new or worse belly pain.     · You have a fever.     · There is a new or increasing yellow tint to your skin or the whites of your eyes.     · You have any abnormal bleeding, such as:  ? Nosebleeds. ? Vaginal bleeding that is different (heavier, more frequent, at a different time of the month) than what you are used to.  ? Bloody or black stools, or rectal bleeding. ? Bloody or pink urine. Watch closely for changes in your health, and be sure to contact your doctor if:    · You have any problems.     · Your belly is getting bigger.     · You are gaining weight. Where can you learn more? Go to http://www.gray.com/  Enter M412 in the search box to learn more about \"Cirrhosis: Care Instructions. \"  Current as of: February 10, 2021               Content Version: 13.0  © 2006-2021 [employer]. Care instructions adapted under license by [employer] (which disclaims liability or warranty for this information). If you have questions about a medical condition or this instruction, always ask your healthcare professional. [place] [address] any warranty or liability for your use of this information.     Hospice: Care Instructions  Your Care Instructions  Hospice care provides medical treatment to relieve symptoms at the end of life. The goal is to keep you comfortable, not to try to cure you. Hospice care does not speed up or lengthen dying. It focuses on easing pain and other symptoms. Hospice caregivers want to enhance your quality of life. Hospice care also offers emotional help and spiritual support when you are dying. It also helps family members care for a loved one who is dying. Hospice care can help you review your life, say important things to family and friends, and explore spiritual issues. Hospice also helps your family and friends deal with their grief after you die. You can use hospice care if your illness cannot be cured and doctors believe you have no more than 6 months to live. You do not need to be confined to a bed or in a hospital to benefit from this type of care. The hospice team includes nurses, counselors, therapists, social workers, pastors, home health aides, and trained volunteers. You can get care in your own home or in a hospice center. Some hospice workers also go to nursing homes or hospitals. How can you care for yourself at home? · Prepare a list of advance directives. These are instructions to your doctor and family members about what kind of care you want if you become unable to speak or express yourself. · Find out if your health insurance covers hospice care. · Find hospice programs in your area. People who can help include your doctor, your state health department, and your insurance company. · Decide what kinds of hospice services you want. It helps to know what you want before you enter a hospice program.  · Think about some questions when preparing for hospice care. ? Who do you want to make decisions about your medical care if you are not able to? Many people choose their spouse, child, or doctor. ? What are you most afraid of that might happen? You might be afraid of having pain or losing your independence.  Let your hospice team know your fears. The team can help you deal with them. ? Where would you prefer to die? Choices include your home, a hospital, or a nursing home. ? Do you want to donate organs when you die? Make sure that your family clearly understands this. ? Do you want any Adventism rites or practices to be done before you die? Let your hospice team know what you want. Where can you learn more? Go to http://www.gray.com/  Enter D435 in the search box to learn more about \"Hospice: Care Instructions. \"  Current as of: 2021               Content Version: 13.0  © 9411-1369 [employer]. Care instructions adapted under license by [employer] (which disclaims liability or warranty for this information). If you have questions about a medical condition or this instruction, always ask your healthcare professional. [place] [address] any warranty or liability for your use of this information. HOSPITALIST DISCHARGE INSTRUCTIONS    NAME: Елена Bullock   :  1950   MRN:  [id number]     Date/Time:  2022 6:17 AM    ADMIT DATE: 2022   DISCHARGE DATE: 2022     Attending Physician: Chapo Camarena NP    DISCHARGE DIAGNOSIS:  Cirrhosis (scarring of the liver)  Recurrent ascities (accumulation of fluid in the abdominal cavity)  Hyponatremia (low blood sodium)  Anemia (low blood count)  Thrombocytopenia (low blood platelets)  Wall thickening involvine ascending and transverse colon  Chronic obstructive pulmonary disease  Hypothyroidism (abnormal function of the thyroid gland  Coronary artery disease (abnormal blood flow to the heart muscle)  Hypertension (elevated blood pressure)  Dyslipidemia (abnormal blood lipids/fats)  Vitamin B12 deficiency   Depression    Generalized deconditioning   Weakness  Recurrent falls      Medications: Per above medication reconciliation.     Pain Management: per above medications    Recommended diet: Easy to chew Diet and 1200 cc fluid restriction with nutritional supplements each meal    Recommended activity: Activity as tolerated    Wound care: None    Indwelling devices:  None    Supplemental Oxygen: None and Oxygen as needed for comfort    Required Lab work: Per SNF and hospice providers    Glucose management:  None    Code status: Durable DNR sent with patient     Call [facility] interventional [address]    Outside physician follow up: Follow-up Information     Follow up With Specialties Details Why Contact Info    [address] [place] [address],[address] [facility]   [facility]  [facility]  [phone]               Skilled nursing facility/ SNF MD/Hospice provider responsible for above on discharge. Information obtained by :  I understand that if any problems occur once I am at home I am to contact my physician. I understand and acknowledge receipt of the instructions indicated above.                                                                                                                                            Physician's or R.N.'s Signature                                                                  Date/Time                                                                                                                                              Patient or Representative

## 2022-02-12 NOTE — PROGRESS NOTES
No further concerns indicated at this time. AVS updated. Pt is ready for discharge from a Care Management standpoint. RN informed. Transition of Care Plan:    RUR: 20%   Disposition: Return to LTC at [address] [place] [address],[address] with [facility]   Please call report to   Room 701 - [place] unit   Follow up appointments: Will be seen by provider at facility   DME needed: DME needs provided by facility   Transportation at Discharge: BLS transport via [facility] (ETA now 5:00 PM)  Keys or means to access home:  N/A return to facility       IM Medicare Letter: 1st IMM given 2/11  Is patient a BCPI-A Bundle:  N/A         If yes, was Bundle Letter given?:    Is patient a  and connected with the [place]? N/A              If yes, was [place] transfer form completed and VA notified? Caregiver Contact: Steven Leonora - sister p) [phone]  Discharge Caregiver contacted prior to discharge? Yes  Care Conference needed?:   No    2:04 PM update:   CM updated by bedside RN that pt has not been transported and requested for CM to communicate further with [address] [place] [address] and [facility]. CM called [address] [place] [address],[address] [place] [address] and talked with Charge RN on [facility] unit to re-emphasize that pt will NOT be having drain placed, therefore their voiced concerns about drain were not relevant and should not prevent her transition back today. CM also notified that room assignment has changed to 701 on [place] unit. CM followed up with [facility], delayed until ETA 5 PM, CM and unit not notified of change in ETA. CM notified bedside RN of new transport time, who notified [address] [place] [address] RN during report. Report has been provided. CM called pt's sister, Manan Alves, to inform of delay in transport time. Pt's sister very understanding of situation and appreciated update. CM also updated [employer]. Original note 9:19 AM:  CM acknowledges discharge order. Plan remains for patient to return to LTC at [address] [place] [address],[address] with [facility].  [facility] to transport pt at 12:30 PM today. Unit CM notified on yesterday that pt would be able to return to [facility] over the weekend. [facility] aware of transport time. Plan and transport time reviewed with pt's sister, Heidi Lopez, via phone. No concerns voiced with transition of care plans, pt's sisters plan to meet her at the facility upon her arrival. CM notified by bedside RN that pt would not be having drain placed during hospitalization. Care Management Interventions  PCP Verified by CM:  Yes  Palliative Care Criteria Met (RRAT>21 & CHF Dx)?: No  Mode of Transport at Discharge: S  Transition of Care Consult (CM Consult): [address]: Yes  Discharge Durable Medical Equipment: No  Health Maintenance Reviewed: Yes  Physical Therapy Consult: Yes  Occupational Therapy Consult: Yes  Speech Therapy Consult: No  Support Systems: [address]  Confirm Follow Up Transport: Wheelchair Elly Lawrence  The Plan for Transition of Care is Related to the Following Treatment Goals : Return to LTC with Hospice  The Patient and/or Patient Representative was Provided with a Choice of Provider and Agrees with the Discharge Plan?: Yes  Name of the Patient Representative Who was Provided with a Choice of Provider and Agrees with the Discharge Plan: Tiera Noonan (pt)  Freedom of Choice List was Provided with Basic Dialogue that Supports the Patient's Individualized Plan of Care/Goals, Treatment Preferences and Shares the Quality Data Associated with the Providers?: Yes  Discharge Location  [facility] (Return to [address] [place] [address],[address] Floor for LTC [facility])      Aminta Garcia 178, [address]

## 2022-02-12 NOTE — PROGRESS NOTES
End of Shift Note    Bedside shift change report given to Juan Pablo  (oncoming nurse) by Basia Valencia RN (offgoing nurse). Report included the following information SBAR, Kardex, ED Summary, Procedure Summary, Intake/Output and MAR    Shift worked:  4946-7161     Shift summary and any significant changes:     Pt rested comfortable with no complaints of pain. Pt took medication per MAR. Safety rounding and toileting needs complete. CHG bath given and gown changed. Pt scheduled for DC @0900.      Concerns for physician to address:       Zone phone for oncoming shift:            Baisa Valencia, RN

## 2022-02-12 NOTE — PROGRESS NOTES
Problem: Falls - Risk of  Goal: *Absence of Falls  Description: Document Lexi Skill Fall Risk and appropriate interventions in the flowsheet. Outcome: Resolved/Met  Note: Fall Risk Interventions:  Mobility Interventions: Patient to call before getting OOB    Mentation Interventions: Adequate sleep, hydration, pain control    Medication Interventions: Bed/chair exit alarm    Elimination Interventions: Bed/chair exit alarm    History of Falls Interventions: Bed/chair exit alarm         Problem: Patient Education: Go to Patient Education Activity  Goal: Patient/Family Education  Outcome: Resolved/Met     Problem: Pressure Injury - Risk of  Goal: *Prevention of pressure injury  Description: Document Mark Scale and appropriate interventions in the flowsheet.   Outcome: Resolved/Met  Note: Pressure Injury Interventions:  Sensory Interventions: Assess changes in LOC    Moisture Interventions: Minimize layers    Activity Interventions: Increase time out of bed    Mobility Interventions: Pressure redistribution bed/mattress (bed type)    Nutrition Interventions: Document food/fluid/supplement intake    Friction and Shear Interventions: HOB 30 degrees or less                Problem: Patient Education: Go to Patient Education Activity  Goal: Patient/Family Education  Outcome: Resolved/Met     Problem: Patient Education: Go to Patient Education Activity  Goal: Patient/Family Education  Outcome: Resolved/Met

## 2022-02-13 NOTE — HOSPICE
[address] [facility]  Good Help to Those in Need  [phone]  Patient Name: Farheen Bills  Date of Birth: [birth year]  Age: 73                                                         Principle Hospice Diagnosis: End Stage Liver Disease  Diagnoses RELATED to the terminal prognosis: CARBAJAL(non alcoholic fatty liver disease) cirrhosis  Recurrent ascites    Hyponatremia   Anemia  Thrombocytopenia   Wall thickening involving ascending and transverse colon  COPD (Chronic Obstructive Pulmonary Disease)  Hypothyroidism   CAD (Coronary Artery Disease)  s/p remote PCI/stent (post stent placement through percutaneous insertion)  HTN (hypertension)  Dyslipidemia   Vitamin B12 deficiency   Depression    Generalized deconditioning   Weakness  Recurrent falls      Date of Hospice Admission: 22  Hospice Attending Elected by Patient: none elected      Admitting RN: Grady Ocampo RN  Nurse CM:  Carolyn Babb RN  : Art MANN  :  Timoteo Wilson DNR: Yes         Home: TBD    Direct Observation:   Patient appears pale, oriented to self, place and situation, delayed responses noted. Lungs clear to bases bilaterally. Pulses palpable x 4, no edema noted. Abdomen softly distended with + BS x 4, last BM today. Patient incontinent of urine, no odor noted. Blanchable redness noted to sacrum, no open areas. Patient is able to assist some with turning/repositioning. Palliative Performance Scale:  40      ER Visits/ Hospitalizations in past year: 2  Onset Date of Hospice Diagnosis:    Summary of Disease Progression Leading to Hospice Diagnosis:     Per  NP Pattie Ego 22:  CARBAJAL cirrhosis Recurrent ascities   CT abdomen/pelvis 22: 1. Cirrhosis. Moderate to large volume ascites. 2.  Wall thickening involving the ascending and transverse colon, maybe on the basis of portal colopathy, but correlate for infectious or inflammatory colitis.  3.  Healing lateral right 8th-10th rib fractures. 4.  Cholelithiasis. Ascites fluid culture 02/04/22:  NG at 4 days.  - Patient was evaluated by Gi while hospitalized. - MELD = 18. - INR stable. - S/P US-guided paracentesis in ED for 5600 cc with repeat paracentesis on 02/08/22 for 3100 cc. - On 02/11/22 patient's abdomen was more distended and she complained of mild discomfort/bloating.   - Patient was sent for peritoneal drain on 02/11/22 however once in the IR department patient requested that the drain be placed higher on her abdomen and there was insufficient ascites to have drain placed in her desired location. Patient declined having drain placed and will need to be reassessed for drain at a later date when there is sufficient ascites to allow the drain to be placed higher on her abdomen.    - Patient received 8 doses of albumin 12.5 gm. - Continue furosemide 40 mg po bid. - Continue spironolactone 50 mg po daily. - Continue KCl 20 mEq po daily, serum K+ remained stable. - Continue rifaximin 550 mg po bid.   - Discharge to [facility] with hospice. Hyponatremia  - Patient was followed by nephrology while hospitalized. - Last recorded serum Na+ 130.  - Continue 1200 cc fluid restriction. Anemia Thrombocytopenia  - Thrombocytopenia 2/2 liver disease. - No sign of active hemorrhage. Wall thickening involvine ascending and transverse colon - Patient was evaluated by GI while hospitalized. - Likely related to portal colopathy. - Conservative approach. COPD - Continue budesonide 250 mcg nebs bid. Hypothyroidism  - Continue levothyroxine 112 mcg po daily. CAD s/p remote PCI/stent HTN Dyslipidemia  - Continue asa 81 mg po daily.  - Diuretic as noted above. - Not on lipid lowering agent 2/2 cirrhosis. Vitamin B12 deficiency  - Continue cyanocobalamin 1000 mcg IM q30d. Depression   - Continue sertraline 50 mg po daily.      Generalized deconditioning  Weakness Recurrent falls - Supportive care ______________________________________________________________________        Patients will be considered to be in the terminal stage of liver disease (life expectancy of six months or less) if they meet the following criteria. (1 and 2 should be present; factors from 3 will lend supporting documentation.):  _____x___  1. The patient should show both a and b:                          ____x____  a. Prothrombin time prolonged more than 5 seconds over control, or                                                   International Normalized Ratio (INR) >1.5;                          ____x____  b. Serum albumin     __x______  2. End stage liver disease is present and the patient shows at  least one of the following:                          ____x____  a. Ascites, refractory to treatment or patient non-compliant;                          ________  b. Spontaneous bacterial peritonitis;                          ________  c. Hepatorenal syndrome (elevated creatinine and BUN with oliguria                           ________  d. Hepatic encephalopathy, refractory to treatment, or patient non-compliant;                          ________  e. Recurrent variceal bleeding, despite intensive therapy. __x______  3. Documentation of the following factors will support eligibility for hospice care:                          ____x____  a. Progressive malnutrition;                          __x______  b. Muscle wasting with reduced strength and endurance;                          ________  c. Continued active alcoholism (>80 gm ethanol/day);                          ________  d. Hepatocellular carcinoma;                          ________  e. HBsAg (Hepatitis B) positivity;                          ________  f. Hepatitis C refractory to interferon treatment.      NOTE: Patients awaiting liver transplant who otherwise fit the above criteria may be certified for the Medicare hospice benefit, but if a donor organ is procured, the patient should be discharged from hospice. SPIRITUAL/Social/Emotional/psych: Sister is MPOA and seems to be coping well. Dr. Maday Lozano on behalf of Dr. Nahid Recinos                    contacted, agrees to serve as attending provider for hospice and provided verbal certification of terminal illness with prognosis of 6 months or less life expectancy. Orders for hospice admission, medications and plan of treatment received. Medication reconciliation completed.         MEDS:  I have reviewed the patient's medication list with MD and identified the following:  Nonformulary medications: n/a  Unrelated medications:  n/a    IDT communication to include MD, SN, SW, [address] [place] [address] and support team.

## 2022-02-13 NOTE — PROGRESS NOTES
Patient discharged on 02/12/22 however transportation did not arrive and patient was held over noc. PE unchanged. Patient remains stable for discharge.

## 2022-02-13 NOTE — HOSPICE
[facility] RN note:  Pt ready for discharge, Hospital to Home medical transport at bedside by 9:00am.   Attempted to contact sister Kem Mead [phone], busy signal, unable to leave message. Admission RN Valdez Mott notified that pt is en route to [facility]. Manny Puga #[id number]   [birth year] Discharging to Admit at [facility] 2/12   (discharged)  DX: cirrhosis Date of Consult: 2/8     Clinical Notes: met with patient and family at bedside. Reviewed hospice. Patient is spend down at [facility] [place] and applied for medicaid. Family is deciding and will let us know tomorrow. 2/10 will d/c to [facility] for an 11am admission. Consents signed/scanned. 2/11 discharged delayed to Saturday 2/12 for placement of aspira drain but there was not enough fluid so will have to be done as outpatient procedure. 2/12: ready for discharge; [facility]R delayed X 3 - patient still waiting. 2 /13--Hospital to Home at bedside. DDNR copied for transport. Hospice admission RN Valdez Mott to admit pt to hospice at [facility]. Thank you for the opportunity to care for this pt and family. Please contact hospice at [phone] with any questions or concerns.

## 2022-02-16 PROBLEM — Z51.5 HOSPICE CARE: Status: ACTIVE | Noted: 2022-01-01

## 2022-02-16 NOTE — HOSPICE
Patient lying in bed, alert and Ox3. She answered all questions appropriately. She denied any pain and was able to move all extremities. She stated she is eating well and had a bowel movement yesterday. Her abdomen was distended but she agreed that it was not as bad as it has been. She has no edema in her extremities. Discussion had about her ascites and getting it drained if it gets severe. Her skin is jaundice but she has no open areas of skin. Discussion had about hospice philosophy and she was informed about hospice visit and frequency. V.S. 128/76; temp 98.8; O2 sat 95% RA; pulse 82; resp 16. Sister, Rosendo Lamb, called and given update on patient's condition.

## 2022-02-16 NOTE — HOSPICE
LMSW met with with patient bedside at [facility]. Patient was awake and alert to self and situation. Patient reported no emotional concerns or pain. Patient stated that she feels well care fored at facility. LMSW provided socialization to patient who engaged with some reservation. LMSW checked in with facility staff who reported no new concerns other than patient's recent need for hospice services. LMSW called and spoke with patient's sister Ashley Good. LMSW explained social work role. Ashley Good provided social history for patient stating that patient and family used to use music to cope, naming artists patient enjoys including [place], [place] pawan, and Blue Just Between Friends. Ashley Good stated that she saw the patient yesterday for 3 hours and helped patient get settled in to facility. Ashley Good had questions about facility living. LMSW encouraged Ashley Good to contact facility and inquire on patient's behalf. Sister shared that she is MPOA or assisting in some capacity for both patient and her oldest sister as patient is middle child. Ashley Good shared how she did not imagine that \"this is how I would spend my FPC. \" LMSW provided validation of feeling and role. LMSW inquired about final arrangements. Ashley Good stated that patient wants to be cremated but they have not determined a provider yet. LMSW offered to provide list of cremation providers. Ashley Good declined this support at this time. LMSW will continue to be available to provide support and address needs that arise.

## 2022-02-18 NOTE — HOSPICE
Music Therapy Assessment  [address] [facility]  Rm 701  Patient Telephone Number: Sarah Lynn [phone]  Anglican Affiliation: Unknown   Language: English     Date: 2/15/22    Mental Status:   Christel.Hem  ] Alert [place].Hem  ] Melba Reardon [  ]  Confused  [  ] Minimally responsive  [  ] Sleeping    Communication Status: [  ] Impaired Speech [ X ] Verbal     Physical Status:   [  ] Oxygen in use  [  ] Hard of Hearing [  ] Vision Impaired   [  ] Ambulatory  [place].Hem  ] In bed during visit    Music Preferences, Background: Enjoys a variety of classic rock, zydeco, bluegress, folk;    Clinical Problem addressed: _Social isolation, emotional support;    Goal(s) met in session:  Physical/Pain management (Scale of 1-10):    Pre-session rating ___________    Post-session rating __________  Christel.Hem  ] Increased relaxation               [  ] Affected breathing patterns  [  ] Decreased muscle tension               [  ] Decreased agitation  [  ] Affected heart rate    Christel.Hem  ] Increased alertness     Emotional/Psychological:  Christel.Hem  ] Increased self-expression   [  ] Decreased aggressive behavior   [  ] Decreased feelings of stress              [  ] Discussed healthy coping skills     [  ] Improved mood    [  ] Decreased withdrawn behavior     Social:  [  ] Decreased feelings of isolation/loneliness [ X ] Positive social interaction    [ X ] Provided support and/or comfort for family/friends    Spiritual:  [  ] Spiritual support    [  ] Expressed peace  [  ] Expressed ioana    [  ] Discussed beliefs    Techniques Utilized (Check all that apply):   [  ] Procedural support MT [  ] Music for relaxation             [ X ] Patient preferred music  [  ] Jennifer analysis  [ X ] Song choice  [ X ]  Music for validation  [  ] Entrainment              [  ] Movement to music             [  ] Guided visualization  [  ] Abhishek Riddle   [  ] Patient instrument playing [  ] Yumiko Watson writing  [  ] Miriam Wu along   [  ] Stephanie Gilman              [ X ] Sensory stimulation  [ X ] Active Listening  [  ] Music for spiritual support [  ] Making of CDs as gifts    Session Observations:  Jayna Buck was visited in her room at [facility], which is shared with another resident who was also in bed resting. Ms. Vannessa Tanner appeared comfortable, and was soft spoked. She was introduced to this therapist and offered a song from suggestions given by her sister. She smiled and expressed interest in more music, and a variety of classic rock and folk music was played for her. She reminisced about listening to music on road trips with her sister, and attending concerts. Ms. Vannessa Tanner would like a follow up visit to listen to live music. Her sister Prudence Lopez, was called to discuss the visit, and Charley Elizalde expressed gratitude and concern for Madiha Cota. Thank you for the opportunity to provide music therapy to Jayna Buck.   Scar Weathers, [facility]   Music [address] Certified  Spiritual Care Services  Referral- based service

## 2022-02-22 NOTE — HOSPICE
is visiting for a routine pastoral visit with Maria M Corley. She was in bed at the time of visitation and struggled with speech to the . She was able to engaged in dialogue about her interests; she loves reading  and mystery novels. She is a person of ioana and welcomes prayer.  provided an active listening presence, life review, and prayer.      Taras et al. Assessing Spiritual Concerns in Palliative Care (2.21.22)  Need for Meaning in the Face of Sufferin  Need for integrity, legacy, generativity: 1  Concerns about relationships: family and/or significant others: 1  Concern or fear of dying or death: 1  Issues related to making decisions about treatment: 0  R/S Struggle: 1  0--no evidence of unmet need; (0*--further assessment needed to clarify needs)  1--some evidence of unmet need  2--substantial evidence of unmet need  3--evidence of severe unmet need  Total Spiritual Distress Score: 5/18

## 2022-02-23 NOTE — HOSPICE
Routine SN assessment visit  Patient received supine in hospital bed with her eyes open  Appears drowsy  Alert and verbally responsive, inapprpriate responses  Does not remember her daughter's name   No S/S of acute pain during visit anf patient denies having any pain or shortness of breath on room air at rest   Skin arm and dry  Color slightly jaundiced  Breath sounds clear  No cough noted  No peripheral edema   Per staff  patient eats only fair   Denies having nausea currently    Abdomen distended, soft and non tender   No new issues reported by staff during this visit     Patient is bed bound  Staff monitors patient's skin and safety   Informed staff to call hospice 24/7 with concens / needs  Understanding  verbalized

## 2022-03-03 NOTE — HOSPICE
Patient asleep, sitting up in her bed. She was easily awakened. She denied any pain. Magan Branch NP also in with patient for this visit. Patient's abdomen slightly distended. Abdomen palpated. Patient still denied discomfort. Patient's lungs were clear bilat. She was not short of breath. Her skin and sclera are jaundice and arms have petechia. No open areas of skin seen. Staff states she is not eating well. Sister, Hayden Barahona, called. She stated the patient is not eating well because her hands tremble so bad she can not hold anything. Hayden Barahona stated she has found pills in the patient's bed and side table. Will discuss this with the staff nurse at the next visit. Will also increase weekly visits to 2x's/wk due to patient's increased weakness.  V.S. 122/66; pulse 74; O2 st 98% RA; temp 97.8; resp 20  Supplies ordered: XL briefs; wipes; zinc cream

## 2022-03-06 NOTE — HOSPICE
attempted a visit with Hardik Johnson. She expressed feeling tired and was not up for an extended visit. She welcomed prayer.  offered prayer by bedside.

## 2022-03-07 NOTE — HOSPICE
visit to assess pt after facility updated there had been changes over the weekend. On arrival updated by Bobby Spears, her LPN. NP from facility rounded today and all daily meds d/c. She is also now on scheduled dilaudid with breakthrough dilaudid available. PPS 20, family at bedside. Sister Manchester Bitters appropriate, reminiscing about happier times.  visit requested, so I emailed  team. Pt is well palliated, poorly repsonsive. No resp. difficulty noted. no grimacing or moaning, lung sounds diminished. skin w/d, petechiae scattered throughout. /58, pulse 60s. Pt now at EOL, mouth care only. Family realistic about her prognosis.  Facility staff decline need for any additional meds at this time

## 2022-03-09 NOTE — HOSPICE
Patient lying supine in bed. She was awake. She was non-verbal but nods and shakes her head at yes/no questions. She initially denied any pain but she grimced and moaned when turned to be changed. She had no urine output but there was a spot of blood from a small open wound on her buttock. Area cleaned and foam drsg applied. Her lungs were clear but diminished. The bottom of her feet and toes were mottled. Spoke with facility nurse who stated she would give her routine dose of pain meds soon. V.S. 102/P; pulse 66; O2 sat 100% RA; temp 97.3; resp 20. Sister, Kelley Cook, called and given update on her condition.

## 2022-03-09 NOTE — HOSPICE
LMSW met with patient bedside. Patient opened her eyes to her name being called. LMSW asked if patient would like to be read to. Patient nodded. LMSW read comforting poems to patient. Patient became restless and grimacing. Facility nurse medicated patient. LMSW played music patient enjoys including Fabby Nathan and Thermon Heady as patient waited for pain medication to take effect. While music was playing, patient's face relaxed. LMSW checked in with facility  Teddy Rea who reported no  home listed in their records. LMSW stated that LMSW will follow up with  home choice today. LMSW called and spoke with patient's sister Abby Rush to provide update of visit and offer support. Abby Rush stated that she is coping well at this time and visited patient yesterday. Abby Rush stated that family will use [facility] for cremation and will have an informal wake at the house for patient as this aligns with her wishes. LMSW provided validation and encouraged a call with any additional needs. LMSW updated facility  on  home choice. LMSW will continue to be available to offer support and address needs that arise.

## 2022-03-10 NOTE — HOSPICE
RN hospice assessment visit 0-7 days  Patient received supine in hospital bed with eyes closed   No response to tactile stimuli    No S/S of pain or shortness of breath on room air  Color jaundice  Skin cool and dry  Breath sounds clear, diminished    Staff reports giving the scheduled Dilaudid 2 mg Q 6 hours and prn Dilaudid 1 mg for breakthrough  This pain management schedule is effective in managing patient's symptoms at end of life   Bilateral feet cold to touch with mottling    Patient is well palliated appearing peaceful, comfortable   Oral care rendered as needed  by facility staff   To continue with current hospice plan of care at end of life Instructed facility staff to call hospice wi concerns / needs   Understanding verbalized by staff

## 2022-03-10 NOTE — HOSPICE
Received notification from Leelee Ames at [facility] that patient passed, pronounced by facility NP at 09:15. Family notified by facility staff and in route to mariya care. Leelee Ames stated she would contact NH once family is ready. Encouraged Emiliano to call [phone] [employer] if MSW or  support needed for family. No DME in chart. Dr Alice Moreno notified via email.

## 2022-03-11 ENCOUNTER — HOME CARE VISIT (OUTPATIENT)
Dept: HOSPICE | Facility: HOSPICE | Age: 72
End: 2022-03-11
Payer: MEDICARE

## 2022-03-12 NOTE — HOSPICE
is visitng for an EOL support visit. Pt was alone at the time.  provided besdide support, words of encouragement and prayer.

## 2022-04-18 ENCOUNTER — HOME CARE VISIT (OUTPATIENT)
Dept: HOSPICE | Facility: HOSPICE | Age: 72
End: 2022-04-18
Payer: MEDICARE